# Patient Record
Sex: MALE | Race: WHITE
[De-identification: names, ages, dates, MRNs, and addresses within clinical notes are randomized per-mention and may not be internally consistent; named-entity substitution may affect disease eponyms.]

---

## 2020-06-13 ENCOUNTER — HOSPITAL ENCOUNTER (EMERGENCY)
Dept: HOSPITAL 75 - ER | Age: 65
Discharge: HOME | End: 2020-06-13
Payer: COMMERCIAL

## 2020-06-13 VITALS — BODY MASS INDEX: 29.17 KG/M2 | HEIGHT: 73.62 IN | WEIGHT: 224.87 LBS

## 2020-06-13 VITALS — SYSTOLIC BLOOD PRESSURE: 147 MMHG | DIASTOLIC BLOOD PRESSURE: 78 MMHG

## 2020-06-13 DIAGNOSIS — Z88.8: ICD-10-CM

## 2020-06-13 DIAGNOSIS — Z79.82: ICD-10-CM

## 2020-06-13 DIAGNOSIS — Z79.52: ICD-10-CM

## 2020-06-13 DIAGNOSIS — H11.31: Primary | ICD-10-CM

## 2020-06-13 DIAGNOSIS — K50.90: ICD-10-CM

## 2020-06-13 DIAGNOSIS — Z88.0: ICD-10-CM

## 2020-06-13 DIAGNOSIS — Z88.1: ICD-10-CM

## 2020-06-13 DIAGNOSIS — Z88.5: ICD-10-CM

## 2020-06-13 LAB
APTT BLD: 25 SEC (ref 24–35)
BASOPHILS # BLD AUTO: 0 10^3/UL (ref 0–0.1)
BASOPHILS NFR BLD AUTO: 0 % (ref 0–10)
EOSINOPHIL # BLD AUTO: 0.2 10^3/UL (ref 0–0.3)
EOSINOPHIL NFR BLD AUTO: 3 % (ref 0–10)
ERYTHROCYTE [DISTWIDTH] IN BLOOD BY AUTOMATED COUNT: 13.5 % (ref 10–14.5)
HCT VFR BLD CALC: 34 % (ref 40–54)
HGB BLD-MCNC: 11.2 G/DL (ref 13.3–17.7)
INR PPP: 0.9 (ref 0.8–1.4)
LYMPHOCYTES # BLD AUTO: 1.3 X 10^3 (ref 1–4)
LYMPHOCYTES NFR BLD AUTO: 26 % (ref 12–44)
MANUAL DIFFERENTIAL PERFORMED BLD QL: NO
MCH RBC QN AUTO: 32 PG (ref 25–34)
MCHC RBC AUTO-ENTMCNC: 33 G/DL (ref 32–36)
MCV RBC AUTO: 97 FL (ref 80–99)
MONOCYTES # BLD AUTO: 0.4 X 10^3 (ref 0–1)
MONOCYTES NFR BLD AUTO: 8 % (ref 0–12)
NEUTROPHILS # BLD AUTO: 3.3 X 10^3 (ref 1.8–7.8)
NEUTROPHILS NFR BLD AUTO: 63 % (ref 42–75)
PLATELET # BLD: 192 10^3/UL (ref 130–400)
PMV BLD AUTO: 9.1 FL (ref 7.4–10.4)
PROTHROMBIN TIME: 12 SEC (ref 12.2–14.7)
WBC # BLD AUTO: 5.3 10^3/UL (ref 4.3–11)

## 2020-06-13 PROCEDURE — 36415 COLL VENOUS BLD VENIPUNCTURE: CPT

## 2020-06-13 PROCEDURE — 85610 PROTHROMBIN TIME: CPT

## 2020-06-13 PROCEDURE — 85025 COMPLETE CBC W/AUTO DIFF WBC: CPT

## 2020-06-13 PROCEDURE — 85730 THROMBOPLASTIN TIME PARTIAL: CPT

## 2020-06-13 NOTE — XMS REPORT
Continuity of Care Document

                             Created on: 2020



Kiran Gibson

External Reference #: [id number]

: 1955

Sex: Male



Demographics





                          Address                   [address] [place] [address]

 

                          Home Phone                [phone] x

 

                          Preferred Language        Unknown

 

                          Marital Status            Unknown

 

                          Anabaptism Affiliation     Unknown

 

                          Race                      Unknown

 

                          Ethnic Group              Unknown





Author





                          Organization              Unknown

 

                          Address                   Unknown

 

                          Phone                     Unavailable



              



Allergies

      



There is no data.                  



Medications

      



There is no data.                  



Problems

      



There is no data.                  



Procedures

      



There is no data.                  



Results

      



                    Test                Result              Range        

 

                                        URIC ACID, SERUM - 19 12:19       

  

 

                    URIC ACID           6.1 mg/dL           4.0-8.0        

 

                                        CBC w/MANUAL DIFF - 19 12:19      

   

 

                    WHITE BLOOD CELL COUNT           7.5 Thousand/uL           3

.8-10.8        

 

                    RED BLOOD CELL COUNT           4.26 Million/uL           4.2

0-5.80        

 

                    HEMOGLOBIN           12.4 g/dL           13.2-17.1        

 

                    HEMATOCRIT           38.5 %              38.5-50.0        

 

                    MCV                 90.4 fL             80.0-100.0        

 

                    MCH                 29.1 pg             27.0-33.0        

 

                    MCHC                32.2 g/dL           32.0-36.0        

 

                    RDW                 13.2 %              11.0-15.0        

 

                    PLATELET COUNT           257 Thousand/uL           140-400  

      

 

                    MPV                 9.8 fL              7.5-12.5        

 

                    ABSOLUTE NEUTROPHILS           5460 cells/uL           1500-

7800        

 

                    ABSOLUTE MONOCYTES           585 cells/uL           200-950 

       

 

                    ABSOLUTE EOSINOPHILS           218 cells/uL           

        

 

                    ABSOLUTE BASOPHILS           0 cells/uL           0-200     

   

 

                    NEUTROPHILS           72.8 %              NRG        

 

                    LYMPHOCYTES           13.6 %              NRG        

 

                    MONOCYTES           7.8 %               NRG        

 

                    EOSINOPHILS           2.9 %               NRG        

 

                    BASOPHILS           0 %                 NRG        

 

                    ABSOLUTE BAND NEUTROPHILS           218 cells/uL           0

-750        

 

                    ABSOLUTE LYMPHOCYTES           1020 cells/uL           850-3

900        

 

                    BAND NEUTROPHILS           2.9 %               NRG        

 

                    PLATELET ESTIMATION           ADEQUATE            ADEQUATE  

      

 

                    CBC MORPHOLOGY                               NORMAL        

 

                    COMMENT(S)                               NRG        

 

                                        ESR/SED RATE - 19 12:19         

 

                    SED RATE BY MODIFIED WESTERGREN           34 mm/h           

  < OR = 20        

 

                                        CBC w/MANUAL DIFF - 19 15:07      

   

 

                    WHITE BLOOD CELL COUNT           7.5 Thousand/uL           3

.8-10.8        

 

                    RED BLOOD CELL COUNT           4.19 Million/uL           4.2

0-5.80        

 

                    HEMOGLOBIN           12.2 g/dL           13.2-17.1        

 

                    HEMATOCRIT           37.9 %              38.5-50.0        

 

                    MCV                 90.5 fL             80.0-100.0        

 

                    MCH                 29.1 pg             27.0-33.0        

 

                    MCHC                32.2 g/dL           32.0-36.0        

 

                    RDW                 13.2 %              11.0-15.0        

 

                    PLATELET COUNT           269 Thousand/uL           140-400  

      

 

                    MPV                 9.7 fL              7.5-12.5        

 

                    ABSOLUTE NEUTROPHILS           5625 cells/uL           1500-

7800        

 

                    ABSOLUTE MONOCYTES           75 cells/uL           200-950  

      

 

                    ABSOLUTE EOSINOPHILS           158 cells/uL           

        

 

                    ABSOLUTE BASOPHILS           158 cells/uL           0-200   

     

 

                    NEUTROPHILS           75.0 %              NRG        

 

                    LYMPHOCYTES           16.7 %              NRG        

 

                    MONOCYTES           1.0 %               NRG        

 

                    EOSINOPHILS           2.1 %               NRG        

 

                    BASOPHILS           2.1 %               NRG        

 

                    ABSOLUTE BAND NEUTROPHILS           233 cells/uL           0

-750        

 

                    ABSOLUTE LYMPHOCYTES           1253 cells/uL           850-3

900        

 

                    BAND NEUTROPHILS           3.1 %               NRG        

 

                    PLATELET ESTIMATION           ADEQUATE            ADEQUATE  

      

 

                    CBC MORPHOLOGY                               NORMAL        

 

                                        ESR/SED RATE - 19 15:07         

 

                    SED RATE BY MODIFIED WESTERGREN           19 mm/h           

  < OR = 20        

 

                                        RA (RHEUMATOID) FACTOR - 19 15:07 

        

 

                    RHEUMATOID FACTOR           <14 IU/mL           <14        

 

                                        JONA - 19 15:07         

 

                    JONA SCREEN, IFA           NEGATIVE            NEGATIVE      

  

 

                                        LYME, TOTAL ANTIBODY/REFLEX WESTERN BLOT

 - 19 09:41         

 

                    LYME AB SCREEN           <0.90 index           NRG        

 

                                        ESR/SED RATE - 20 15:22         

 

                    SED RATE BY MODIFIED WESTERGREN           9 mm/h            

  < OR = 20        

 

                                        TESTOSTERONE, FREE AND TOTAL - 20 

15:22         

 

                    TESTOSTERONE, TOTAL, LC/MS/MS           128 ng/dL           

250-1100        

 

                    TESTOSTERONE, FREE           23.1 pg/mL           46.0-224.0

        

 

                    TESTOSTERONE,BIOAVAILABLE           42.5 ng/dL           110

.0-575.0        

 

                    SEX HORMONE BINDING GLOBULIN           20 nmol/L           2

2-77        

 

                                        A1C - 20 15:22         

 

                    HEMOGLOBIN A1c           5.1 % of total Hgb           <5.7  

      

 

                                        VITAMIN B12/FOLATE, SERUM PANEL -  15:22         

 

                    VITAMIN B12           >2000 pg/mL           200-1100        

 

                    FOLATE, SERUM           >24.0 ng/mL           NRG        



                                      



Encounters

      



                ACCT No.           Visit Date/Time           Discharge          

 Status         

             Pt. Type           Provider           Facility           Loc./Unit 

          

Complaint        

 

                [id number]           2020 13:30:00           2020 23:59:5

9           [facility] 

                Outpatient                                           [facility]    

                                                 

 

             [id number]           2020 15:15:00                              

       Document

Registration                                                                    

 

             [id number]           2019 10:00:00                              

       Document

Registration                                                                    

 

             [id number]           2019 14:15:00                              

       Document

Registration                                                                    

 

             [id number]           2019 11:40:00                              

       Document

Registration

## 2020-06-13 NOTE — XMS REPORT
[facility]

                             Created on: 2019



Arthur Kiran

External Reference #: [id number]

: 1955

Sex: Male



Demographics





                          Address                   [address] [place]  [address]

 

                          Preferred Language        Unknown

 

                          Marital Status            Unknown

 

                          Faith Affiliation     Unknown

 

                          Race                      Unknown

 

                          Ethnic Group              Unknown





Author





                          Author                    Kiran BEVERLY              [facility]

 

                          Address                   [address] [place]  [address]



 

                          Phone                     [phone]







Care Team Providers





                    Care Team Member Name Role                Phone

 

                    CONCHA BEVERLY      Unavailable         [phone]







PROBLEMS





          Type      Condition ICD9-CM Code IYG62-MH Code Onset Dates Condition S

tatus SNOMED 

Code

 

          Problem   Protein malnutrition           E46                 Active   

 14008231

 

                          Problem                   Crohn''s disease with compli

cation, unspecified gastrointestinal tract 

location                  K50.919                   Active       83031800

 

          Problem   Exacerbation of Crohn''s disease with complication          

 K50.919             Active    

44235634

 

          Problem   Peyronie disease           N48.6               Active    133

5005

 

                Problem         Other, mixed, or unspecified nondependent drug a

buse, unspecified                 

F19.10                                  Active              [id number]

 

          Problem   Hypoglycemia           E16.2               Active    4751264

03

 

          Problem   Peyronie's disease           N48.6               Active    1

613651

 

          Problem   Situational depression           F43.21              Active 

   46818162

 

          Problem   Left otitis externa           H60.92              Active    

[id number]

 

          Problem   Hyperglyceridemia, pure           E78.1               Active

    472389593

 

          Problem   Other chronic pain           G89.29              Active    8

1149548

 

          Problem   CVA (cerebral infarction)           I63.9               Acti

ve    390822640

 

          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

kiara    [id number]

 

          Problem   Chronic pain           G89.29              Active    3002437

1







ALLERGIES

No Information



ENCOUNTERS





                Encounter       Location        Date            Diagnosis

 

                    [address] [address] 

Jul, 2019                                

 

                    [address] [address] 

2019                               Arthralgia, unspecified joint M25.50

 

                    [address] [address]                                Elevated sed rate R70.0 and Chronic pain

 G89.29

 

                    [address] [address]                                Elevated sed rate R70.0 and Chronic pain

 G89.29

 

                    [address] [address]                                AK (actinic keratosis) L57.0 ; Elevated 

sed rate R70.0 ; Arthralgia, 

unspecified joint M25.50 ; Arthralgia of right wrist M25.531 and Vitamin B12 
deficiency E53.8

 

                    [address] [address]                                Arthralgia of right wrist M25.531

 

                    [address] [address]                                Arthralgia of right wrist M25.531

 

                    [address] [address] 28 

May, 2019                               Cellulitis of left lower extremity L03.1

16 ; Vitamin B12 deficiency 

E53.8 and Encounter for venous access device care Z45.2

 

                    [address] [address] 03 

May, 2019                               Vitamin B12 deficiency E53.8

 

                    [address] [address]                                Exacerbation of Crohn''s disease with co

mplication K50.919

 

                    [address] [address]                                 

 

                    [address] [address]                                CVA (cerebral infarction) I63.9 ; Perfor

ation of left tympanic 

membrane H72.92 ; Basal cell carcinoma (BCC) of left side of nose C44.311 ; B12 
deficiency E53.8 and Hyperglycemia R73.9

 

                    [facility] [address] [place] [facility] [place] [address] 18

Mar, 2019                               Left otitis media H66.92

 

                    [address] [address] 15 

Mar, 2019                               Crohn''s disease with complication, unsp

ecified gastrointestinal tract

location K50.919

 

                    [address]  [place] 

[place] [address] 

Mar, 2019                               Crohn''s disease with complication, unsp

ecified gastrointestinal tract

location K50.919 ; Other, mixed, or unspecified nondependent drug abuse, 
unspecified F19.10 ; Hypoglycemia E16.2 ; Hyperglyceridemia, pure E78.1 ; 
Orchitis and epididymitis, unspecified N45.3 ; Hypopotassemia E87.6 ; 
Prostatitis, unspecified N41.9 ; Anemia, unspecified type D64.9 ; Endocarditis, 
unspecified chronicity, unspecified endocarditis type I38 ; Abdominal pain, 
unspecified abdominal location R10.9 ; Situational depression F43.21 ; Vitamin 
B12 deficiency E53.8 ; Ileus K56.7 ; Protein malnutrition E46 ; Other chronic 
pain G89.29 ; Testosterone deficiency E34.9 ; Testicular pain N50.819 ; 
Exacerbation of Crohn''s disease with complication K50.919 ; Peyronie disease 
N48.6 and Elevated liver enzymes R74.8







IMMUNIZATIONS





                Vaccine         Route           Administration Date Status

 

                B12, VITAMIN (UP TO 1000 MCG) IM Intramuscular March 15, 2019  A

dministered







SOCIAL HISTORY

Never Assessed



REASON FOR VISIT

port flush/B12



PLAN OF CARE





VITAL SIGNS





MEDICATIONS

Unknown Medications



RESULTS

No Results



PROCEDURES





                Procedure       Date Ordered    Result          Body Site

 

                IRRIG DRUG DELIVERY DEVICE March 15, 2019                   

 

                THER/PROPH/DIAG INJ, SC/IM March 15, 2019                   

 

                B12, VITAMIN (UP TO 1000 MCG) March 15, 2019                   







INSTRUCTIONS





MEDICATIONS ADMINISTERED

No Known Medications



MEDICAL (GENERAL) HISTORY





                    Type                Description         Date

 

                    Medical History     CVA (cerebral infarction)  

 

                    Medical History     Brain stem stroke syndrome  

 

                          Medical History           Crohn''s disease with compli

cation, unspecified gastrointestinal

tract location                           

 

                    Medical History     Hypoglycemia         

 

                    Medical History     Hyperglyceridemia, pure  

 

                    Medical History     Situational depression  

 

                    Medical History     Chronic pain         

 

                    Medical History     Peyronie's disease   

 

                          Medical History           Other, mixed, or unspecified

 nondependent drug abuse, 

unspecified                              

 

                    Medical History     Hypoglycemia         

 

                    Medical History     Hyperglyceridemia, pure  

 

                    Medical History     Protein malnutrition  

 

                    Surgical History    tonsillectomy        

 

                    Surgical History    testicle removal right  

 

                    Surgical History    colon resection x6   

 

                    Surgical History    appendectomy        1976

 

                    Surgical History    colon adhesion removal x5  

 

                    Surgical History    cholecystectomy      

 

                    Hospitalization History stroke               

 

                    Hospitalization History surgeries            

 

                    Hospitalization History crohns disease, several times  

 

                    Hospitalization History right ankle, possible infection

## 2020-06-13 NOTE — XMS REPORT
[facility]

                             Created on: 2020



Arthur Kiran

External Reference #: [id number]

: 1955

Sex: Male



Demographics





                          Address                   [address] [place]  [address]

 

                          Preferred Language        Unknown

 

                          Marital Status            Unknown

 

                          Mandaen Affiliation     Unknown

 

                          Race                      Unknown

 

                          Ethnic Group              Unknown





Author





                          Author                    Kiran BEVERLY              [facility]

 

                          Address                   [address] [place]  [address]



 

                          Phone                     [phone]







Care Team Providers





                    Care Team Member Name Role                Phone

 

                    RINA BEVERLYWELL      Unavailable         [phone]







PROBLEMS





          Type      Condition ICD9-CM Code FBF67-LU Code Onset Dates Condition S

tatus SNOMED 

Code

 

          Problem   Protein malnutrition           E46                 Active   

 99179630

 

                          Problem                   Crohn''s disease with compli

cation, unspecified gastrointestinal tract 

location                  K50.919                   Active       32417031

 

          Problem   Exacerbation of Crohn''s disease with complication          

 K50.919             Active    

86596898

 

          Problem   Situational depression           F43.21              Active 

   41227560

 

          Problem   Hyperglyceridemia, pure           E78.1               Active

    207633842

 

                Problem         Other, mixed, or unspecified nondependent drug a

buse, unspecified                 

F19.10                                  Active              [id number]

 

          Problem   Peyronie disease           N48.6               Active    133

5005

 

          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

kiara    26961208

 

          Problem   Chronic pain           G89.29              Active    4869783

1

 

          Problem   CVA (cerebral infarction)           I63.9               Acti

ve    983029612

 

                          Problem                   Type 2 diabetes mellitus wit

h other specified complication, without 

long-term current use of insulin              E11.69                    Active  

     94664170

 

          Problem   Other chronic pain           G89.29              Active    8

3780437

 

           Problem    Crohn's disease of small intestine without complications  

          K50.00                

Active                                  09253347

 

          Problem   Hypoglycemia           E16.2               Active    2901931

03

 

          Problem   Peyronie's disease           N48.6               Active    1

367328

 

          Problem   Left otitis externa           H60.92              Active    

[id number]

 

          Problem   Vitamin B12 deficiency           E53.8               Active 

   819839583

 

          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

ctive    498468594







ALLERGIES





             Substance    Reaction     Event Type   Date         Status

 

             Ibuprofen    rash         Drug Allergy  Active

 

             Cefazolin Sodium rash         Drug Allergy  Active

 

             Promethazine HCl unknown      Drug Allergy  Active

 

             Butorphanol Tartrate unknown      Drug Allergy  Active

 

             Penicillin G Benzathine anaphylaxis  Drug Allergy  Acti

ve

 

             Ofloxacin    diarrhea     Drug Allergy  Active

 

             Morphine Sulfate unknown      Drug Allergy  Active

 

             Keflex       rash         Drug Allergy  Active

 

             Piperacillin Sod-Tazobactam So swelling     Drug Allergy  Active

 

             Penicillin V Potassium swelling     Drug Allergy  Activ

e

 

             Zolpidem Tartrate unknown      Drug Allergy  Active







ENCOUNTERS





                Encounter       Location        Date            Diagnosis

 

                          [address]

[id number] 

[address]                               

 

                          [address]

[id number] 

[address]                               

 

                          [address]

[id number] 

[address]                14 May, 2020              Moderate depressive disorder

 F32.9 ; Pain of left eye 

H57.12 and Chronic pain G89.29

 

                          [address]

[id number] 

[address]                06 May, 2020              Vitamin B12 deficiency E53.8

 and Encounter for care 

related to Port-a-Cath Z45.2

 

                          [address]

[id number] 

[address]                06 May, 2020              CVA (cerebral infarction) I6

3.9

 

                          [address]

[id number] [place] 

[address]                16 Mar, 2020              Vitamin B12 deficiency E53.8

 and Encounter for venous 

access device care Z45.2

 

                          [address]

[id number] [place] 

[address]                13 2020              Vitamin B12 deficiency E53.8

 and Encounter for venous 

access device care Z45.2

 

                          [address]

[id number] 

[address]                10 2020               

 

                          [address]

[id number] [place] 

[address]                07 2020              CVA (cerebral infarction) I6

3.9 ; Moderate depressive 

disorder F32.9 and Chronic pain G89.29

 

                          [address]

[id number] [place] 

[address]                04 2020              CVA (cerebral infarction) I6

3.9

 

                          [address]

[id number] [place] 

[address]                              Crohn's disease of small int

estine without complications

K50.00 ; Moderate depressive disorder F32.9 ; Vitamin B12 deficiency E53.8 ; 
Testosterone deficiency E34.9 ; Chronic pain G89.29 and Type 2 diabetes mellitus
with other specified complication, without long-term current use of insulin 
E11.69

 

                          [address]

[id number] 

[address]                              Hyperglycemia R73.9 ; Elevat

ed sed rate R70.0 ; Vitamin 

B12 deficiency E53.8 and Testosterone deficiency E34.9

 

                          [address]

[id number] 

[address]                              Encounter for venous access 

device care Z45.2

 

                          [address]

[id number] 

[address]                27 Dec, 2019              B12 deficiency E53.8

 

                          [address]

[id number] 

[address]                              Vitamin B12 deficiency E53.8

 ; Encounter for venous 

access device care Z45.2 and Encounter for immunization Z23

 

                          [address]

[id number] 

[address]                15 Nov, 2019               

 

                          [address]

[id number] 

[address]                29 Oct, 2019              CVA (cerebral infarction) I6

3.9

 

                          [address]

[id number] 

[address]                16 Oct, 2019              Vitamin B12 deficiency E53.8

 ; Hyperglycemia R73.9 ; 

Testosterone deficiency E34.9 and Elevated sed rate R70.0

 

                          [address]

[id number] 

[address]                14 Oct, 2019               

 

                          [facility] [address]

[id number] [place] 

[address]                10 Oct, 2019              Crohn''s disease with compli

cation, unspecified 

gastrointestinal tract location K50.919

 

                          [facility] [address]

[id number] [place] 

[address]                23 Sep, 2019              Vitamin B12 deficiency E53.8

 

                          [facility] [address]

[id number] [place] 

[address]                20 Sep, 2019              Crohn''s disease with compli

cation, unspecified 

gastrointestinal tract location K50.919 ; Situational depression F43.21 and 
Onychomycosis of great toe B35.1

 

                          [facility] [address]

[id number] [place] 

[address]                10 Sep, 2019              Vitamin B12 deficiency E53.8

 

                          [facility] [address]

[id number] [place] 

[address]                23 Aug, 2019              Vitamin B12 deficiency E53.8

 

                          [facility] [address]

[id number] 

[address]                09 Aug, 2019              Vitamin B12 deficiency E53.8

 and Encounter for venous 

access device care Z45.2

 

                          [facility] [address]

[id number] [place] 

[address]                05 Aug, 2019               

 

                          [facility] [address]

[id number] [place] 

[address]                               

 

                          [facility] [address]

[id number] [place] 

[address]                               

 

                          [facility] [address]

[id number] 

[address]                              Crohn''s disease with compli

cation, unspecified 

gastrointestinal tract location K50.919

 

                          [facility] [address]

[id number] [place] 

[address]                15 Jul, 2019              CVA (cerebral infarction) I6

3.9

 

                          [facility] [address]

[id number] [place] 

[address]                15 Jul, 2019              Elevated sed rate R70.0

 

                          [facility] [address]

[id number] 

[address]                              Vitamin B12 deficiency E53.8

 and Crohn''s disease with 

complication, unspecified gastrointestinal tract location K50.919

 

                          [address]

[id number] [place] 

[address]                              Arthralgia, unspecified join

t M25.50

 

                          [address]

[id number] 

[address]                              Elevated sed rate R70.0 and 

Chronic pain G89.29

 

                          [facility] [address]

[id number] 

[address]                              Elevated sed rate R70.0 and 

Chronic pain G89.29

 

                          [address]

[id number] 

[address]                              AK (actinic keratosis) L57.0

 ; Elevated sed rate R70.0 ;

Arthralgia, unspecified joint M25.50 ; Arthralgia of right wrist M25.531 and 
Vitamin B12 deficiency E53.8

 

                          [facility] [address]

[id number] [place] 

[address]                              Arthralgia of right wrist M2

5.531

 

                          [address]

[id number] 

[address]                              Arthralgia of right wrist M2

5.531

 

                          [address]

[id number] 

[address]                28 May, 2019              Cellulitis of left lower ext

remity L03.116 ; Vitamin B12

deficiency E53.8 and Encounter for venous access device care Z45.2

 

                          [facility] [address]

[id number] 

[address]                03 May, 2019              Vitamin B12 deficiency E53.8

 

                          [address]

[id number] [place] 

[address]                              Exacerbation of Crohn''s dis

ease with complication 

K50.919

 

                          [address]

[id number] [place] 

[address]                               

 

                          [facility] [address]

[id number] [place] 

[address]                              CVA (cerebral infarction) I6

3.9 ; Perforation of left 

tympanic membrane H72.92 ; Basal cell carcinoma (BCC) of left side of nose 
C44.311 ; B12 deficiency E53.8 and Hyperglycemia R73.9

 

                          [facility] [address] 

[id number] [place]

[address]                18 Mar, 2019              Left otitis media H66.92

 

                          [address]

[id number] [place] 

[address]                15 Mar, 2019              Crohn''s disease with compli

cation, unspecified 

gastrointestinal tract location K50.919

 

                          [address]

[id number] [place] 

[address]                13 Mar, 2019              Crohn''s disease with compli

cation, unspecified 

gastrointestinal tract location K50.919 ; Other, mixed, or unspecified 
nondependent drug abuse, unspecified F19.10 ; Hypoglycemia E16.2 ; Hyperglycerid
emia, pure E78.1 ; Orchitis and epididymitis, unspecified N45.3 ; Hypopotassemia
E87.6 ; Prostatitis, unspecified N41.9 ; Anemia, unspecified type D64.9 ; 
Endocarditis, unspecified chronicity, unspecified endocarditis type I38 ; 
Abdominal pain, unspecified abdominal location R10.9 ; Situational depression 
F43.21 ; Vitamin B12 deficiency E53.8 ; Ileus K56.7 ; Protein malnutrition E46 ;
Other chronic pain G89.29 ; Testosterone deficiency E34.9 ; Testicular pain 
N50.819 ; Exacerbation of Crohn''s disease with complication K50.919 ; Peyronie 
disease N48.6 and Elevated liver enzymes R74.8







IMMUNIZATIONS





                Vaccine         Route           Administration Date Status

 

                B12, VITAMIN (UP TO 1000 MCG) IM Intramuscular 2019  A

dministered







SOCIAL HISTORY

Never Assessed



REASON FOR VISIT

stroke f/u



PLAN OF CARE





                          Activity                  Details

 

                                         

 

                          Follow Up                 6 Months Reason:fu







VITAL SIGNS





                    Height              6'2" in             2019

 

                    Weight              198 lbs             2019

 

                    BMI                 25.42 kg/m2         2019

 

                    Blood pressure systolic 136 mmHg            2019

 

                    Blood pressure diastolic 84 mmHg             2019







MEDICATIONS





        Medication Instructions Dosage  Frequency Start Date End Date Duration S

tatus

 

        Dilaudid 8 MG Orally 4 times a day 1 tablet as needed 6h                

              Active

 

        Methadone HCl 10 MG Orally Once a day 1 tablet 24h                      

       Active

 

        Lisinopril 5 MG Orally Once a day 1 tablet 24h                     30 da

y(s) Active

 

                    Levothyroxine Sodium 25 MCG Orally Once a day   1 tablet on 

an empty stomach in 

the morning  24h                                    30 day(s)    Active

 

        Spironolactone 50 MG Orally Once a day 1 tablet with food 24h           

          30 day(s) 

Active

 

          Cyanocobalamin 1000 MCG/ML Injection every 2 weeks 1 ml               

 13 Mar, 2019           

lifetime                                Active

 

        Gabapentin 100 MG Orally Three times a day 1 capsule 8h                 

     30 day(s) Active

 

             Temazepam 15 MG Orally Once a day 1-2 capsule at bedtime as needed 

24h                                              30 days             Active

 

        Questran Light 4 GM/DOSE Orally Twice a day 1 scoop 12h                 

    30 day(s) Active

 

          Venlafaxine HCl  MG Orally Once a day 1 capsule with food 24h   

                        30 

day(s)                                  Active







RESULTS





                Name            Result          Date            Reference Range

 

                A1C (IN HOUSE)                                   

 

                A1C IN HOUSE    5.3                             4.3 - 5.6 %

 

                Previous A1c                                     

 

                Lot             [id number]                             

 

                Exp date        2020                          







PROCEDURES





                Procedure       Date Ordered    Result          Body Site

 

                GLYCATED HEMOGLOBIN TEST 2019                   

 

                THER/PROPH/DIAG INJ, SC/IM 2019                   

 

                B12, VITAMIN (UP TO 1000 MCG) 2019                   

 

                [facility] VISIT ESTABLISHED PATIENT 2019                   

 

                DESTROY BENIGN/PREMLG LESION 2019                   

 

                LESION DESTRUCTION SINGLE (PREMALG) 2019      N/A         

     







INSTRUCTIONS





MEDICATIONS ADMINISTERED

No Known Medications



MEDICAL (GENERAL) HISTORY





                    Type                Description         Date

 

                    Medical History     CVA (cerebral infarction)  

 

                    Medical History     Brain stem stroke syndrome  

 

                          Medical History           Crohn''s disease with compli

cation, unspecified gastrointestinal

tract location                           

 

                    Medical History     Hypoglycemia         

 

                    Medical History     Hyperglyceridemia, pure  

 

                    Medical History     Situational depression  

 

                    Medical History     Chronic pain         

 

                    Medical History     Peyronie's disease   

 

                          Medical History           Other, mixed, or unspecified

 nondependent drug abuse, 

unspecified                              

 

                    Medical History     Hypoglycemia         

 

                    Medical History     Hyperglyceridemia, pure  

 

                    Medical History     Protein malnutrition  

 

                    Surgical History    tonsillectomy       1963

 

                    Surgical History    testicle removal right  

 

                    Surgical History    colon resection x6   

 

                    Surgical History    appendectomy        1976

 

                    Surgical History    colon adhesion removal x5  

 

                    Surgical History    cholecystectomy     

 

                    Surgical History    hernia inguinal repair (bilateral) 

 

                    Surgical History    insert non-tunnel cv cath 2009

 

                    Surgical History    hernia repair       1963

 

                    Surgical History    cataract removal on both eyes 10/2019

 

                    Hospitalization History stroke               

 

                    Hospitalization History surgeries            

 

                    Hospitalization History crohns disease, several times  

 

                    Hospitalization History right ankle, possible infection

## 2020-06-13 NOTE — XMS REPORT
[facility]

                             Created on: 2019



Arthur Kiran

External Reference #: [id number]

: 1955

Sex: Male



Demographics





                          Address                   [address] [place]  [address]

 

                          Preferred Language        Unknown

 

                          Marital Status            Unknown

 

                          Oriental orthodox Affiliation     Unknown

 

                          Race                      Unknown

 

                          Ethnic Group              Unknown





Author





                          Author                    Kiran RIZZO

 

                          [facility]

 

                          Address                   [address] [place]  [address]



 

                          Phone                     [phone]







Care Team Providers





                    Care Team Member Name Role                Phone

 

                    ELEANOR RIZZO        Unavailable         [phone]







PROBLEMS





          Type      Condition ICD9-CM Code BSR67-WX Code Onset Dates Condition S

tatus SNOMED 

Code

 

          Problem   Situational depression           F43.21              Active 

   13261235

 

          Problem   Hyperglyceridemia, pure           E78.1               Active

    [id number]

 

                Problem         Other, mixed, or unspecified nondependent drug a

buse, unspecified                 

F19.10                                  Active              [id number]

 

          Problem   Peyronie disease           N48.6               Active    133

5005

 

          Problem   Exacerbation of Crohn''s disease with complication          

 K50.919             Active    

[id number]

 

                          Problem                   Crohn''s disease with compli

cation, unspecified gastrointestinal tract 

location                  K50.919                   Active       77206175

 

          Problem   Other chronic pain           G89.29              Active    8

4380517

 

          Problem   Left otitis externa           H60.92              Active    

[id number]

 

          Problem   Protein malnutrition           E46                 Active   

 24286656

 

          Problem   Vitamin B12 deficiency           E53.8               Active 

   564912883

 

          Problem   Hypoglycemia           E16.2               Active    6623716

03

 

          Problem   CVA (cerebral infarction)           I63.9               Acti

ve    [id number]

 

          Problem   Chronic pain           G89.29              Active    9553060

1

 

          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

kiara    21612458

 

          Problem   Peyronie's disease           N48.6               Active    1

811203







ALLERGIES





             Substance    Reaction     Event Type   Date         Status

 

             Keflex       rash         Drug Allergy 18 Mar, 2019 Active

 

             Cefazolin Sodium rash         Drug Allergy 18 Mar, 2019 Active

 

             Zolpidem Tartrate unknown      Drug Allergy 18 Mar, 2019 Active

 

             Butorphanol Tartrate unknown      Drug Allergy 18 Mar, 2019 Active

 

             Penicillin V Potassium swelling     Drug Allergy 18 Mar, 2019 Activ

e

 

             Penicillin G Benzathine anaphylaxis  Drug Allergy 18 Mar, 2019 Acti

ve

 

             Ofloxacin    diarrhea     Drug Allergy 18 Mar, 2019 Active

 

             Morphine Sulfate unknown      Drug Allergy 18 Mar, 2019 Active

 

             Promethazine HCl unknown      Drug Allergy 18 Mar, 2019 Active

 

             Piperacillin Sod-Tazobactam So swelling     Drug Allergy 18 Mar, 20

19 Active

 

             IBU          unknown      Drug Allergy 18 Mar, 2019 Active







ENCOUNTERS





                Encounter       [facility]        Date            Diagnosis

 

                    [facility] [address] [address]                                 

 

                    [facility] [address] [address] 23 

Sep, 2019                               Vitamin B12 deficiency E53.8

 

                    [facility] [address] [address] 20 

Sep, 2019                               Crohn''s disease with complication, unsp

ecified gastrointestinal tract

location K50.919 ; Situational depression F43.21 and Onychomycosis of great toe 
B35.1

 

                    [facility] [address] [address] 10 

Sep, 2019                               Vitamin B12 deficiency E53.8

 

                    [facility] [address] [address] 23 

Aug, 2019                               Vitamin B12 deficiency E53.8

 

                    [address] [address] 09 

Aug, 2019                               Vitamin B12 deficiency E53.8 and Encount

er for venous access device 

care Z45.2

 

                    [facility] [address] [address] 05 

Aug, 2019                                

 

                    [address] [address]                                 

 

                    [facility] [address] [address]                                 

 

                    [address] [address]                                Crohn''s disease with complication, unsp

ecified gastrointestinal tract

location K50.919

 

                    [address] [address] 15 

Jul, 2019                               CVA (cerebral infarction) I63.9

 

                    [address] [address] 15 

Jul, 2019                               Elevated sed rate R70.0

 

                    [facility] [address] [address]                                Vitamin B12 deficiency E53.8 and Crohn''

s disease with complication, 

unspecified gastrointestinal tract location K50.919

 

                    [address] [address]                                Arthralgia, unspecified joint M25.50

 

                    [address] [address]                                Elevated sed rate R70.0 and Chronic pain

 G89.29

 

                    [address] [address]                                Elevated sed rate R70.0 and Chronic pain

 G89.29

 

                    [address] [address]                                AK (actinic keratosis) L57.0 ; Elevated 

sed rate R70.0 ; Arthralgia, 

unspecified joint M25.50 ; Arthralgia of right wrist M25.531 and Vitamin B12 
deficiency E53.8

 

                    [address] [address]                                Arthralgia of right wrist M25.531

 

                    [address] [address]                                Arthralgia of right wrist M25.531

 

                    [address] [address] 28 

May, 2019                               Cellulitis of left lower extremity L03.1

16 ; Vitamin B12 deficiency 

E53.8 and Encounter for venous access device care Z45.2

 

                    [address] [address] 03 

May, 2019                               Vitamin B12 deficiency E53.8

 

                    [address] [address]                                Exacerbation of Crohn''s disease with co

mplication K50.919

 

                    [address] [address]                                 

 

                    [address] [address]                                CVA (cerebral infarction) I63.9 ; Perfor

ation of left tympanic 

membrane H72.92 ; Basal cell carcinoma (BCC) of left side of nose C44.311 ; B12 
deficiency E53.8 and Hyperglycemia R73.9

 

                    [facility] [address] [place] [facility] [address] 18

Mar, 2019                               Left otitis media H66.92

 

                    [address] [address] 15 

Mar, 2019                               Crohn''s disease with complication, unsp

ecified gastrointestinal tract

location K50.919

 

                    [address] [address] 13 

Mar, 2019                               Crohn''s disease with complication, unsp

ecified gastrointestinal tract

location K50.919 ; Other, mixed, or unspecified nondependent drug abuse, 
unspecified F19.10 ; Hypoglycemia E16.2 ; Hyperglyceridemia, pure E78.1 ; 
Orchitis and epididymitis, unspecified N45.3 ; Hypopotassemia E87.6 ; 
Prostatitis, unspecified N41.9 ; Anemia, unspecified type D64.9 ; Endocarditis, 
unspecified chronicity, unspecified endocarditis type I38 ; Abdominal pain, 
unspecified abdominal location R10.9 ; Situational depression F43.21 ; Vitamin 
B12 deficiency E53.8 ; Ileus K56.7 ; Protein malnutrition E46 ; Other chronic 
pain G89.29 ; Testosterone deficiency E34.9 ; Testicular pain N50.819 ; 
Exacerbation of Crohn''s disease with complication K50.919 ; Peyronie disease 
N48.6 and Elevated liver enzymes R74.8







IMMUNIZATIONS

No Known Immunizations



SOCIAL HISTORY

Never Assessed



REASON FOR VISIT

left ear draining red, Pt presents today with drainage from left ear that starte
d yesterday/ pt stated minor pain/



PLAN OF CARE





                          Activity                  Details

 

                                         

 

                          Follow Up                 prn Reason:







VITAL SIGNS





                    Height              73.5 in             2019

 

                    Weight              202 lbs             2019

 

                    Temperature         97.3 degrees Fahrenheit 2019

 

                    BMI                 26.29 kg/m2         2019

 

                    Blood pressure systolic 142 mmHg            2019

 

                    Blood pressure diastolic 80 mmHg             2019







MEDICATIONS





        Medication Instructions Dosage  Frequency Start Date End Date Duration S

tatus

 

                    Cholestyramine 4 GM/DOSE                     MIX AND TAKE 1 

SCOOPFUL BY MOUTH TWICE DAILY AS 

DIRECTED                                            30           Not-Taking

 

        Spironolactone 50 MG Orally Once a day 1 tablet with food 24h           

          30 day(s) 

Active

 

        Questran Light 4 GM/DOSE Orally Twice a day 1 scoop 12h                 

    30 day(s) Active

 

                    Levothyroxine Sodium 25 MCG Orally Once a day   1 tablet on 

an empty stomach in 

the morning  24h                                    30 day(s)    Active

 

                    Azithromycin 250 MG Orally Once a day   2 tablets  on the fi

rst day, then 1 tablet

daily for 4 days 24h          18 Mar, 2019              5 day(s)     Active

 

          Cyanocobalamin 1000 MCG/ML Injection every 2 weeks 1 ml               

 13 Mar, 2019           

lifetime                                Active

 

        Lisinopril 5 MG Orally Once a day 1 tablet 24h                     30 da

y(s) Active

 

        Methadone HCl 10 MG/5ML Orally every 12 hrs 5 ml as needed 12h          

                   Active

 

        Effexor                                                 Active







RESULTS

No Results



PROCEDURES





                Procedure       Date Ordered    Result          Body Site

 

                [facility] VISIT NEW PATIENT 2019                   







INSTRUCTIONS





MEDICATIONS ADMINISTERED

No Known Medications



MEDICAL (GENERAL) HISTORY





                    Type                Description         Date

 

                    Medical History     CVA (cerebral infarction)  

 

                    Medical History     Brain stem stroke syndrome  

 

                          Medical History           Crohn''s disease with compli

cation, unspecified gastrointestinal

tract location                           

 

                    Medical History     Hypoglycemia         

 

                    Medical History     Hyperglyceridemia, pure  

 

                    Medical History     Situational depression  

 

                    Medical History     Chronic pain         

 

                    Medical History     Peyronie's disease   

 

                          Medical History           Other, mixed, or unspecified

 nondependent drug abuse, 

unspecified                              

 

                    Medical History     Hypoglycemia         

 

                    Medical History     Hyperglyceridemia, pure  

 

                    Medical History     Protein malnutrition  

 

                    Surgical History    tonsillectomy       1963

 

                    Surgical History    testicle removal right  

 

                    Surgical History    colon resection x6   

 

                    Surgical History    appendectomy        1976

 

                    Surgical History    colon adhesion removal x5  

 

                    Surgical History    cholecystectomy     

 

                    Surgical History    hernia inguinal repair (bilateral) 

 

                    Surgical History    insert non-tunnel cv cath 2009

 

                    Surgical History    hernia repair       1963

 

                    Hospitalization History stroke               

 

                    Hospitalization History surgeries            

 

                    Hospitalization History crohns disease, several times  

 

                    Hospitalization History right ankle, possible infection

## 2020-06-13 NOTE — ED EENT
History of Present Illness


General


Chief Complaint:  Eye Problems


Stated Complaint:  BLOOD IN RIGHT EYE


Nursing Triage Note:  


ARRIVED VIA AMB WITH COMPLAINTS OF BLOOD SHOT RIGHT EYE THAT IS NOW LEAKING OUT 


THE SIDE OF THE EYE.  DENIES INJURY.


Source:  patient


Exam Limitations:  no limitations





History of Present Illness


Date Seen by Provider:  Jun 13, 2020


Time Seen by Provider:  13:30


Initial Comments


To ER with reports of blood on the right eye. He states that he will have a 

blood vessel break on his eye about every 2 weeks. He has no discomfort and no 

visual changes. He had cataract surgery by Dr. West last year and has seen 

him a few times since then. He takes a baby aspirin daily. He takes prednisone 

daily for Crohn's disease. He has been using Plantain which is apparently a weed

in his yard to help with pain and uses Wild Lettuce which he dehydrates and 

makes in to a tea to at home to help with. He denies any trauma to the eye, 

denies any coughing or straining or vomiting episodes.


Timing/Duration:  abrupt


Severity:  moderate


Location:  eye (R)


Associated Symptoms:  denies symptoms





Allergies and Home Medications


Allergies


Coded Allergies:  


     Penicillins (Verified  Allergy, Severe, EDEMA, 6/13/20)


     adhesive tape (Verified  Allergy, Severe, RASH, 6/13/20)


     cefazolin (Verified  Allergy, Severe, RASH, 6/13/20)


     cephalexin (Verified  Allergy, Severe, RASH, 6/13/20)


     morphine (Verified  Allergy, Severe, EDEMA, 6/13/20)


     butabarbital (Verified  Allergy, Unknown, 6/13/20)


     butorphanol (Verified  Allergy, Unknown, 6/13/20)


     ibuprofen (Verified  Allergy, Unknown, 6/13/20)


     piperacillin (Verified  Allergy, Unknown, EDEMA, 6/13/20)


     promethazine (Verified  Allergy, Unknown, 6/13/20)


     silicone (Verified  Allergy, Unknown, 6/13/20)


     zolpidem (Verified  Allergy, Unknown, 6/13/20)


     ofloxacin (Verified  Adverse Reaction, Unknown, DIARRHEA, 6/13/20)





Patient Home Medication List


Home Medication List Reviewed:  Yes





Review of Systems


Review of Systems


Constitutional:  see HPI


Eyes:  See HPI


Ears:  No Symptoms Reported


Nose:  no symptoms reported


Mouth:  no symptoms reported


Throat:  no symptoms reported


Respiratory:  no symptoms reported


Cardiovascular:  no symptoms reported


Musculoskeletal:  no symptoms reported


Skin:  no symptoms reported





Past Medical-Social-Family Hx


Patient Social History


Recent Foreign Travel:  No


Contact w/Someone Who Travel:  No


Recent Infectious Disease Expo:  No





Physical Exam


Vital Signs





Vital Signs - First Documented








 6/13/20





 13:08


 


Temp 37.0


 


Pulse 84


 


Resp 16


 


B/P (MAP) 147/78 (101)


 


Pulse Ox 94


 


O2 Delivery Room Air








Height, Weight, BMI


Height: '"


Weight: lbs. oz. kg; 29.00 BMI


Method:


General Appearance:  WD/WN, no apparent distress


Eyes:  right eye other (there is a large subconjunctival hemorrhage affecting 

all quadrants of the visualized low except for the cornea. There is no hyphema. 

There is negative ecchymosis to the lateral aspect of the upper eyelid. Again, 

he denies trauma or injury to the area.); bilateral eye PERRL, bilateral eye 

EOMI


Mouth/Throat:  normal mouth inspection, pharynx normal


Neck:  non-tender, full range of motion


Respiratory:  no respiratory distress, no accessory muscle use


Neurologic/Psychiatric:  alert, normal mood/affect, oriented x 3


Skin:  normal color, warm/dry





Progress/Results/Core Measures


Results/Orders


My Orders





Orders - ELIDA HIDALGO


Cbc With Automated Diff (6/13/20 13:29)


Protime With Inr (6/13/20 13:29)


Partial Thromboplastin Time (6/13/20 13:29)





Vital Signs/I&O











 6/13/20





 13:08


 


Temp 37.0


 


Pulse 84


 


Resp 16


 


B/P (MAP) 147/78 (101)


 


Pulse Ox 94


 


O2 Delivery Room Air














Blood Pressure Mean:                    101











Departure


Communication (Admissions)


Spoke with Dr. Abernathy from optometry, he agrees with the workup and plan of 

care. All





Impression





   Primary Impression:  


   Subconjunctival hemorrhage of right eye


Disposition:  01 HOME, SELF-CARE


Condition:  Stable





Departure-Patient Inst.


Decision time for Depature:  13:36


Referrals:  


OFE TRENT OD, MAXWELL MD (PCP/Family)


Primary Care Physician


Patient Instructions:  Subconjunctival Hemorrhage





Add. Discharge Instructions:  


1. This is a subconjunctival hemorrhage and typically resolves in 2-3 weeks 

without long-term effects. I will have him follow-up with Dr. Trent. Call 

monday for an appointment time. 





All discharge instructions reviewed with patient and/or family. Voiced 

understanding.











ELIDA HIDALGO APRN             Jun 13, 2020 13:33

## 2020-11-17 ENCOUNTER — HOSPITAL ENCOUNTER (INPATIENT)
Dept: HOSPITAL 75 - ER FS | Age: 65
LOS: 3 days | Discharge: HOME | DRG: 386 | End: 2020-11-20
Attending: INTERNAL MEDICINE | Admitting: INTERNAL MEDICINE
Payer: MEDICARE

## 2020-11-17 VITALS — SYSTOLIC BLOOD PRESSURE: 123 MMHG | DIASTOLIC BLOOD PRESSURE: 58 MMHG

## 2020-11-17 VITALS — BODY MASS INDEX: 27.47 KG/M2 | HEIGHT: 73.98 IN | WEIGHT: 214.07 LBS

## 2020-11-17 DIAGNOSIS — K59.00: ICD-10-CM

## 2020-11-17 DIAGNOSIS — R11.2: ICD-10-CM

## 2020-11-17 DIAGNOSIS — E03.9: ICD-10-CM

## 2020-11-17 DIAGNOSIS — N17.9: ICD-10-CM

## 2020-11-17 DIAGNOSIS — Z86.79: ICD-10-CM

## 2020-11-17 DIAGNOSIS — D64.9: ICD-10-CM

## 2020-11-17 DIAGNOSIS — K50.90: Primary | ICD-10-CM

## 2020-11-17 DIAGNOSIS — E87.5: ICD-10-CM

## 2020-11-17 DIAGNOSIS — Z86.73: ICD-10-CM

## 2020-11-17 DIAGNOSIS — R41.0: ICD-10-CM

## 2020-11-17 DIAGNOSIS — Z86.711: ICD-10-CM

## 2020-11-17 LAB
ALBUMIN SERPL-MCNC: 4.4 GM/DL (ref 3.2–4.5)
ALP SERPL-CCNC: 110 U/L (ref 40–136)
ALT SERPL-CCNC: 8 U/L (ref 0–55)
APTT PPP: YELLOW S
BACTERIA #/AREA URNS HPF: (no result) /HPF
BASOPHILS # BLD AUTO: 0 10^3/UL (ref 0–0.1)
BASOPHILS NFR BLD AUTO: 0 % (ref 0–10)
BILIRUB SERPL-MCNC: 0.4 MG/DL (ref 0.1–1)
BILIRUB UR QL STRIP: NEGATIVE
BUN/CREAT SERPL: 16
CALCIUM SERPL-MCNC: 9 MG/DL (ref 8.5–10.1)
CHLORIDE SERPL-SCNC: 110 MMOL/L (ref 98–107)
CO2 SERPL-SCNC: 20 MMOL/L (ref 21–32)
CREAT SERPL-MCNC: 3.04 MG/DL (ref 0.6–1.3)
EOSINOPHIL # BLD AUTO: 0.2 10^3/UL (ref 0–0.3)
EOSINOPHIL NFR BLD AUTO: 3 % (ref 0–10)
FIBRINOGEN PPP-MCNC: CLEAR MG/DL
GFR SERPLBLD BASED ON 1.73 SQ M-ARVRAT: 21 ML/MIN
GLUCOSE SERPL-MCNC: 109 MG/DL (ref 70–105)
GLUCOSE UR STRIP-MCNC: NEGATIVE MG/DL
HCT VFR BLD CALC: 28 % (ref 40–54)
HGB BLD-MCNC: 9.2 G/DL (ref 13.3–17.7)
KETONES UR QL STRIP: NEGATIVE
LEUKOCYTE ESTERASE UR QL STRIP: NEGATIVE
LIPASE SERPL-CCNC: 64 U/L (ref 8–78)
LYMPHOCYTES # BLD AUTO: 0.8 X 10^3 (ref 1–4)
LYMPHOCYTES NFR BLD AUTO: 14 % (ref 12–44)
MAGNESIUM SERPL-MCNC: 1.3 MG/DL (ref 1.6–2.4)
MANUAL DIFFERENTIAL PERFORMED BLD QL: NO
MCH RBC QN AUTO: 31 PG (ref 25–34)
MCHC RBC AUTO-ENTMCNC: 33 G/DL (ref 32–36)
MCV RBC AUTO: 95 FL (ref 80–99)
MONOCYTES # BLD AUTO: 0.4 X 10^3 (ref 0–1)
MONOCYTES NFR BLD AUTO: 7 % (ref 0–12)
NEUTROPHILS # BLD AUTO: 4.5 X 10^3 (ref 1.8–7.8)
NEUTROPHILS NFR BLD AUTO: 77 % (ref 42–75)
NITRITE UR QL STRIP: NEGATIVE
PH UR STRIP: 5.5 [PH] (ref 5–9)
PLATELET # BLD: 257 10^3/UL (ref 130–400)
PMV BLD AUTO: 9.8 FL (ref 7.4–10.4)
POTASSIUM SERPL-SCNC: 5.2 MMOL/L (ref 3.6–5)
PROT SERPL-MCNC: 7.1 GM/DL (ref 6.4–8.2)
PROT UR QL STRIP: (no result)
RBC #/AREA URNS HPF: (no result) /HPF
SODIUM SERPL-SCNC: 141 MMOL/L (ref 135–145)
SP GR UR STRIP: 1.02 (ref 1.02–1.02)
SQUAMOUS #/AREA URNS HPF: (no result) /HPF
WBC # BLD AUTO: 5.8 10^3/UL (ref 4.3–11)
WBC #/AREA URNS HPF: (no result) /HPF

## 2020-11-17 PROCEDURE — 86901 BLOOD TYPING SEROLOGIC RH(D): CPT

## 2020-11-17 PROCEDURE — 96361 HYDRATE IV INFUSION ADD-ON: CPT

## 2020-11-17 PROCEDURE — 86850 RBC ANTIBODY SCREEN: CPT

## 2020-11-17 PROCEDURE — 74176 CT ABD & PELVIS W/O CONTRAST: CPT

## 2020-11-17 PROCEDURE — 36415 COLL VENOUS BLD VENIPUNCTURE: CPT

## 2020-11-17 PROCEDURE — 83735 ASSAY OF MAGNESIUM: CPT

## 2020-11-17 PROCEDURE — 80053 COMPREHEN METABOLIC PANEL: CPT

## 2020-11-17 PROCEDURE — 86920 COMPATIBILITY TEST SPIN: CPT

## 2020-11-17 PROCEDURE — 83540 ASSAY OF IRON: CPT

## 2020-11-17 PROCEDURE — 84443 ASSAY THYROID STIM HORMONE: CPT

## 2020-11-17 PROCEDURE — 86900 BLOOD TYPING SEROLOGIC ABO: CPT

## 2020-11-17 PROCEDURE — 83690 ASSAY OF LIPASE: CPT

## 2020-11-17 PROCEDURE — 85025 COMPLETE CBC W/AUTO DIFF WBC: CPT

## 2020-11-17 PROCEDURE — 71045 X-RAY EXAM CHEST 1 VIEW: CPT

## 2020-11-17 PROCEDURE — 76770 US EXAM ABDO BACK WALL COMP: CPT

## 2020-11-17 PROCEDURE — 96374 THER/PROPH/DIAG INJ IV PUSH: CPT

## 2020-11-17 PROCEDURE — 85027 COMPLETE CBC AUTOMATED: CPT

## 2020-11-17 PROCEDURE — 81000 URINALYSIS NONAUTO W/SCOPE: CPT

## 2020-11-17 PROCEDURE — 84484 ASSAY OF TROPONIN QUANT: CPT

## 2020-11-17 PROCEDURE — 93005 ELECTROCARDIOGRAM TRACING: CPT

## 2020-11-17 PROCEDURE — 82274 ASSAY TEST FOR BLOOD FECAL: CPT

## 2020-11-17 RX ADMIN — DOCUSATE SODIUM AND SENNOSIDES SCH EA: 8.6; 5 TABLET, FILM COATED ORAL at 22:39

## 2020-11-17 RX ADMIN — SODIUM CHLORIDE SCH MLS/HR: 900 INJECTION, SOLUTION INTRAVENOUS at 23:52

## 2020-11-17 RX ADMIN — SODIUM CHLORIDE SCH MLS/HR: 900 INJECTION, SOLUTION INTRAVENOUS at 16:27

## 2020-11-17 RX ADMIN — SODIUM CHLORIDE SCH MLS/HR: 900 INJECTION, SOLUTION INTRAVENOUS at 14:52

## 2020-11-17 NOTE — DIAGNOSTIC IMAGING REPORT
PROCEDURE: CT abdomen and pelvis without contrast.



TECHNIQUE: Multiple contiguous axial images were obtained through

the abdomen and pelvis without the use of intravenous contrast.

Auto Exposure Controls were utilized during the CT exam to meet

ALARA standards for radiation dose reduction. 



INDICATION: Abdominal pain and history of small bowel

obstruction.



COMPARISON: No prior studies are available for comparison.



FINDINGS: Lung bases demonstrate some scarring or atelectasis in

the left lower lobe. Liver is unremarkable. Gallbladder is

surgically absent. There is no biliary ductal dilatation.

Pancreas and spleen are unremarkable. No adrenal mass is

detected. No renal calculi are seen. There is no hydronephrosis.

Postsurgical changes in the anterior midabdomen are seen

involving the transverse colon. Bowel loops do not appear to be

appreciably dilated. No definite obstruction is seen. There is

moderate stool in the colon, consistent with constipation. There

is no free fluid or fluid collection identified. Bony structures

are unremarkable.



IMPRESSION: Moderate stool, suggestive of constipation. No other

significant abnormality is identified.



Dictated by: 



  Dictated on workstation # [id number]

## 2020-11-17 NOTE — ED ABDOMINAL PAIN
General


Chief Complaint:  Abdominal/GI Problems


Stated Complaint:  N/V


Nursing Triage Note:  


Patient presents to the ED with c/o nausea, vomiting, diarrhea, and confusion. 


He reports that he started becoming nauseous and vomited last night. He wife 


states that he has become more confused and she thinks he is dehydrated.


Sepsis Screen:  No Definite Risk


Source of Information:  Patient


Exam Limitations:  No Limitations





History of Present Illness


Date Seen by Provider:  Nov 17, 2020


Time Seen by Provider:  14:30


Initial Comments


Patient is a 65-year-old male with history of multiple small bowel obstructions 

and bowel resection who presents with nausea vomiting and diarrhea with 

increased confusion starting greater than 12 hours ago. Patient states he is 

nauseated constantly has had difficulty keeping food and fluid down and that 

when he does EDS liquid diarrhea. He denies dizziness lightheadedness chest pain

palpitations and shortness of breath. States he feels weak and fatigued when 

standing. No fevers chills, sweats. No known COVID exposures. Patient states 

current symptoms feel different than previous bowel obstructions.


Timing/Duration:  12-24 Hours


Severity/Quality:  Moderate


Location:  Generalized Abdomen


Radiation:  No Radiation


Modifying Factors:  Improves With Vomiting


Associated Symptoms:  Nausea/Vomiting





Allergies and Home Medications


Allergies


Coded Allergies:  


     Penicillins (Verified  Allergy, Severe, EDEMA, 6/13/20)


     adhesive tape (Verified  Allergy, Severe, RASH, 6/13/20)


     cefazolin (Verified  Allergy, Severe, RASH, 6/13/20)


     cephalexin (Verified  Allergy, Severe, RASH, 6/13/20)


     morphine (Verified  Allergy, Severe, EDEMA, 6/13/20)


     butabarbital (Verified  Allergy, Unknown, 6/13/20)


     butorphanol (Verified  Allergy, Unknown, 6/13/20)


     ibuprofen (Verified  Allergy, Unknown, 6/13/20)


     piperacillin (Verified  Allergy, Unknown, EDEMA, 6/13/20)


     promethazine (Verified  Allergy, Unknown, 6/13/20)


     silicone (Verified  Allergy, Unknown, 6/13/20)


     zolpidem (Verified  Allergy, Unknown, 6/13/20)


     ofloxacin (Verified  Adverse Reaction, Unknown, DIARRHEA, 6/13/20)





Patient Home Medication List


Home Medication List Reviewed:  Yes





Review of Systems


Review of Systems


Constitutional:  see HPI


EENTM:  See HPI


Respiratory:  See HPI


Cardiovascular:  See HPI


Gastrointestinal:  See HPI


Genitourinary:  See HPI


Musculoskeletal:  no symptoms reported


Skin:  see HPI


Psychiatric/Neurological:  See HPI


Endocrine:  See HPI


Hematologic/Lymphatic:  See HPI





All Other Systems Reviewed


Negative Unless Noted:  Yes





Past Medical-Social-Family Hx


Past Med/Social Hx:  Reviewed Nursing Past Med/Soc Hx


Patient Social History


Alcohol Use:  Denies Use


Recreational Drug Use:  No


Smoking Status:  Never a Smoker


2nd Hand Smoke Exposure:  No


Recent Foreign Travel:  No


Contact w/Someone Who Travel:  No


Recent Infectious Disease Expo:  No


Recent Hopitalizations:  No


Physical Abuse:  No


Sexual Abuse:  No


Mistreated:  No


Fear:  No





Seasonal Allergies


Seasonal Allergies:  No





Past Medical History


Surgeries:  Yes (X11 ABDOMIAL, EYE)


Respiratory:  No


Cardiac:  Yes (BLOD CLOT, PERICARDITIS)


Neurological:  Yes (BRAIN STEM STROKE)


Genitourinary:  No


Gastrointestinal:  Yes


Crohns Disease


Musculoskeletal:  No


Endocrine:  No


HEENT:  No


Cancer:  No


Psychosocial:  No


Integumentary:  No


Blood Disorders:  No





Physical Exam


Vital Signs





Vital Signs - First Documented








 11/17/20





 13:50


 


Temp 36.9


 


Pulse 67


 


Resp 16


 


B/P (MAP) 151/73 (99)


 


Pulse Ox 99


 


O2 Delivery Room Air





Capillary Refill : Less Than 3 Seconds


Height/Weight/BMI


Height: '"


Weight: lbs. oz. kg; 27.00 BMI


Method:


General Appearance:  WD/WN, no apparent distress


HEENT:  PERRL/EOMI, normal ENT inspection, TMs normal


Neck:  non-tender, full range of motion, supple


Respiratory:  chest non-tender, lungs clear


Cardiovascular:  normal peripheral pulses, regular rate, rhythm


Gastrointestinal:  soft, abnormal bowel sounds; No distended, No guarding, No 

rebound, No tenderness, No hernia, No mass, No hepatomegaly, No spleenomegaly; 

other


Extremities:  normal range of motion, non-tender, no pedal edema


Back:  normal inspection, no CVA tenderness


Neurologic/Psychiatric:  CNs II-XII nml as tested, no motor/sensory deficits, 

alert, oriented x 3


Skin:  normal color





Focused Exam


Sepsis Stage:  Ruled Out





Progress/Results/Core Measures


Results/Orders


Lab Results





Laboratory Tests








Test


 11/17/20


14:35 Range/Units


 


 


White Blood Count


 5.8 


 4.3-11.0


10^3/uL


 


Red Blood Count


 2.98 L


 4.35-5.85


10^6/uL


 


Hemoglobin 9.2 L 13.3-17.7  G/DL


 


Hematocrit 28 L 40-54  %


 


Mean Corpuscular Volume 95  80-99  FL


 


Mean Corpuscular Hemoglobin 31  25-34  PG


 


Mean Corpuscular Hemoglobin


Concent 33 


 32-36  G/DL





 


Red Cell Distribution Width 13.2  10.0-14.5  %


 


Platelet Count


 257 


 130-400


10^3/uL


 


Mean Platelet Volume 9.8  7.4-10.4  FL


 


Immature Granulocyte % (Auto) 0   %


 


Neutrophils (%) (Auto) 77 H 42-75  %


 


Lymphocytes (%) (Auto) 14  12-44  %


 


Monocytes (%) (Auto) 7  0-12  %


 


Eosinophils (%) (Auto) 3  0-10  %


 


Basophils (%) (Auto) 0  0-10  %


 


Neutrophils # (Auto) 4.5  1.8-7.8  X 10^3


 


Lymphocytes # (Auto) 0.8 L 1.0-4.0  X 10^3


 


Monocytes # (Auto) 0.4  0.0-1.0  X 10^3


 


Eosinophils # (Auto)


 0.2 


 0.0-0.3


10^3/uL


 


Basophils # (Auto)


 0.0 


 0.0-0.1


10^3/uL


 


Immature Granulocyte # (Auto)


 0.0 


 0.0-0.1


10^3/uL


 


Urine Color YELLOW   


 


Urine Clarity CLEAR   


 


Urine pH 5.5  5-9  


 


Urine Specific Gravity 1.025 H 1.016-1.022  


 


Urine Protein 1+ H NEGATIVE  


 


Urine Glucose (UA) NEGATIVE  NEGATIVE  


 


Urine Ketones NEGATIVE  NEGATIVE  


 


Urine Nitrite NEGATIVE  NEGATIVE  


 


Urine Bilirubin NEGATIVE  NEGATIVE  


 


Urine Urobilinogen 0.2  < = 1.0  MG/DL


 


Urine Leukocyte Esterase NEGATIVE  NEGATIVE  


 


Urine RBC (Auto) 1+ H NEGATIVE  


 


Urine RBC 0-2   /HPF


 


Urine WBC NONE   /HPF


 


Urine Squamous Epithelial


Cells RARE 


  /HPF





 


Urine Crystals NONE   /LPF


 


Urine Bacteria NONE   /HPF


 


Urine Casts NONE   /LPF


 


Urine Mucus NEGATIVE   /LPF


 


Urine Culture Indicated NO   


 


Sodium Level 141  135-145  MMOL/L


 


Potassium Level 5.2 H 3.6-5.0  MMOL/L


 


Chloride Level 110 H   MMOL/L


 


Carbon Dioxide Level 20 L 21-32  MMOL/L


 


Anion Gap 11  5-14  MMOL/L


 


Blood Urea Nitrogen 50 H 7-18  MG/DL


 


Creatinine


 3.04 H


 0.60-1.30


MG/DL


 


Estimat Glomerular Filtration


Rate 21 


  





 


BUN/Creatinine Ratio 16   


 


Glucose Level 109 H   MG/DL


 


Calcium Level 9.0  8.5-10.1  MG/DL


 


Corrected Calcium 8.7  8.5-10.1  MG/DL


 


Magnesium Level 1.3 L 1.6-2.4  MG/DL


 


Total Bilirubin 0.4  0.1-1.0  MG/DL


 


Aspartate Amino Transf


(AST/SGOT) 11 


 5-34  U/L





 


Alanine Aminotransferase


(ALT/SGPT) 8 


 0-55  U/L





 


Alkaline Phosphatase 110    U/L


 


Troponin I < 0.30  <0.30  NG/ML


 


Total Protein 7.1  6.4-8.2  GM/DL


 


Albumin 4.4  3.2-4.5  GM/DL


 


Lipase 64  8-78  U/L








My Orders





Orders - HARLEEN GARDNER DO


Cbc With Automated Diff (11/17/20 13:51)


Comprehensive Metabolic Panel (11/17/20 13:51)


Lipase (11/17/20 13:51)


Ua Culture If Indicated (11/17/20 13:51)


Chest 1 View Ap/Pa Only (11/17/20 13:51)


Ondansetron Injection (Zofran Injectio (11/17/20 14:00)


Ns Iv 1000 Ml (Sodium Chloride 0.9%) (11/17/20 14:00)


Magnesium (11/17/20 13:52)


Thyroid Stimulating Hormone (11/17/20 13:52)


Troponin I Fs (11/17/20 13:52)


Ekg Tracing (11/17/20 13:52)


Ct Abdomen/Pelvis Wo (11/17/20 15:04)


Ns Iv 1000 Ml (Sodium Chloride 0.9%) (11/17/20 16:15)


Ns Iv 1000 Ml (Sodium Chloride 0.9%) (11/17/20 16:15)





Medications Given in ED





Current Medications








 Medications  Dose


 Ordered  Sig/Khalif


 Route  Start Time


 Stop Time Status Last Admin


Dose Admin


 


 Ondansetron HCl  4 mg  ONCE  ONCE


 IVP  11/17/20 14:00


 11/17/20 14:01 DC 11/17/20 14:52


4 MG








Vital Signs/I&O











 11/17/20





 13:50


 


Temp 36.9


 


Pulse 67


 


Resp 16


 


B/P (MAP) 151/73 (99)


 


Pulse Ox 99


 


O2 Delivery Room Air














Blood Pressure Mean:                    99











Departure


Communication (Admissions)


Patient with vomiting and poor oral intake with findings of acute kidney injury 

and hyperkalemia. IV fluids given the ED. Vital signs stable we'll admit to the 

hospitalist service for further evaluation and treatment.





Impression





   Primary Impression:  


   Acute kidney injury


   Additional Impressions:  


   Hyperkalemia


   Nausea vomiting and diarrhea


Disposition:  09 ADMITTED AS INPATIENT


Condition:  Stable





Admissions


Decision to Admit Reason:  Admit from ER (General)





Transfer


Transfer Reason:  Exceeds level of care


Time Spoke to Accepting Phy:  16:28


Transfer Progress Notes


Dr. Savage


Transfer Time:  16:29





Departure-Patient Inst.


Referrals:  


SELFCONCHA MD (PCP/Family)


Primary Care Physician











HARLEEN GARDNER DO                   Nov 17, 2020 16:23

## 2020-11-17 NOTE — NUR
ALERTED DR URENA TO PT C/O  PAIN, WITH NO PAIN MEDICATION NOTED ON EMAR, SHE PUT IN ORDERS 
FOR PAIN MEDS. PT STATES HE TAKES 8MG OF HYDROMORPHONE HCL Q6HR PRN AT HOME FOR PAIN. I 
RECHECKED FOR CORRECT DOSING WITH PT AS WELL AS EXTERNAL MEDICATION REFILL LIST. PT STATES 
HIS PAIN FROM [facility] DX IS SEVERE AND THAT HIS DR HAS WORKED FOR A LONG TIME WITH HIM TO 
GET A CORRECT DOSING REGIMEN TO STAY ON TOP OF HIS CHRONIC PAIN.

## 2020-11-17 NOTE — DIAGNOSTIC IMAGING REPORT
INDICATION: Weakness. Abdominal pain.



COMPARISON: None.



FINDINGS: Single frontal view of the chest demonstrates normal

heart size and pulmonary vascularity. Right-sided subclavian

Port-A-Cath is noted with tip in the low SVC. The lungs are well

aerated and clear. No large pleural effusion or pneumothorax is

seen. The visualized osseous structures show multiple old

posterolateral left rib fractures.



IMPRESSION:

1. No acute cardiopulmonary process.



Dictated by: 



  Dictated on workstation # [id number]

## 2020-11-18 VITALS — DIASTOLIC BLOOD PRESSURE: 60 MMHG | SYSTOLIC BLOOD PRESSURE: 132 MMHG

## 2020-11-18 VITALS — SYSTOLIC BLOOD PRESSURE: 144 MMHG | DIASTOLIC BLOOD PRESSURE: 65 MMHG

## 2020-11-18 VITALS — DIASTOLIC BLOOD PRESSURE: 77 MMHG | SYSTOLIC BLOOD PRESSURE: 148 MMHG

## 2020-11-18 VITALS — SYSTOLIC BLOOD PRESSURE: 154 MMHG | DIASTOLIC BLOOD PRESSURE: 74 MMHG

## 2020-11-18 VITALS — DIASTOLIC BLOOD PRESSURE: 70 MMHG | SYSTOLIC BLOOD PRESSURE: 145 MMHG

## 2020-11-18 VITALS — SYSTOLIC BLOOD PRESSURE: 152 MMHG | DIASTOLIC BLOOD PRESSURE: 70 MMHG

## 2020-11-18 VITALS — SYSTOLIC BLOOD PRESSURE: 118 MMHG | DIASTOLIC BLOOD PRESSURE: 64 MMHG

## 2020-11-18 VITALS — DIASTOLIC BLOOD PRESSURE: 73 MMHG | SYSTOLIC BLOOD PRESSURE: 145 MMHG

## 2020-11-18 VITALS — DIASTOLIC BLOOD PRESSURE: 64 MMHG | SYSTOLIC BLOOD PRESSURE: 118 MMHG

## 2020-11-18 VITALS — DIASTOLIC BLOOD PRESSURE: 70 MMHG | SYSTOLIC BLOOD PRESSURE: 152 MMHG

## 2020-11-18 LAB
ALBUMIN SERPL-MCNC: 3.5 GM/DL (ref 3.2–4.5)
ALP SERPL-CCNC: 74 U/L (ref 40–136)
ALT SERPL-CCNC: < 6 U/L (ref 0–55)
BASOPHILS # BLD AUTO: 0 10^3/UL (ref 0–0.1)
BASOPHILS NFR BLD AUTO: 0 % (ref 0–10)
BILIRUB SERPL-MCNC: 0.4 MG/DL (ref 0.1–1)
BUN/CREAT SERPL: 16
CALCIUM SERPL-MCNC: 7.5 MG/DL (ref 8.5–10.1)
CHLORIDE SERPL-SCNC: 117 MMOL/L (ref 98–107)
CO2 SERPL-SCNC: 16 MMOL/L (ref 21–32)
CREAT SERPL-MCNC: 2.46 MG/DL (ref 0.6–1.3)
EOSINOPHIL # BLD AUTO: 0.1 10^3/UL (ref 0–0.3)
EOSINOPHIL NFR BLD AUTO: 2 % (ref 0–10)
GFR SERPLBLD BASED ON 1.73 SQ M-ARVRAT: 27 ML/MIN
GLUCOSE SERPL-MCNC: 130 MG/DL (ref 70–105)
HCT VFR BLD CALC: 23 % (ref 40–54)
HGB BLD-MCNC: 7.5 G/DL (ref 13.3–17.7)
LYMPHOCYTES # BLD AUTO: 0.7 10^3/UL (ref 1–4)
LYMPHOCYTES NFR BLD AUTO: 13 % (ref 12–44)
MANUAL DIFFERENTIAL PERFORMED BLD QL: NO
MCH RBC QN AUTO: 31 PG (ref 25–34)
MCHC RBC AUTO-ENTMCNC: 32 G/DL (ref 32–36)
MCV RBC AUTO: 98 FL (ref 80–99)
MONOCYTES # BLD AUTO: 0.5 10^3/UL (ref 0–1)
MONOCYTES NFR BLD AUTO: 9 % (ref 0–12)
NEUTROPHILS # BLD AUTO: 3.9 10^3/UL (ref 1.8–7.8)
NEUTROPHILS NFR BLD AUTO: 75 % (ref 42–75)
PLATELET # BLD: 198 10^3/UL (ref 130–400)
PMV BLD AUTO: 10.2 FL (ref 9–12.2)
POTASSIUM SERPL-SCNC: 4.3 MMOL/L (ref 3.6–5)
PROT SERPL-MCNC: 5.6 GM/DL (ref 6.4–8.2)
SODIUM SERPL-SCNC: 143 MMOL/L (ref 135–145)
WBC # BLD AUTO: 5.1 10^3/UL (ref 4.3–11)

## 2020-11-18 RX ADMIN — FENTANYL CITRATE PRN MCG: 50 INJECTION, SOLUTION INTRAMUSCULAR; INTRAVENOUS at 16:26

## 2020-11-18 RX ADMIN — PREGABALIN SCH MG: 75 CAPSULE ORAL at 12:36

## 2020-11-18 RX ADMIN — FENTANYL CITRATE PRN MCG: 50 INJECTION, SOLUTION INTRAMUSCULAR; INTRAVENOUS at 05:32

## 2020-11-18 RX ADMIN — METHADONE HYDROCHLORIDE SCH MG: 10 TABLET ORAL at 12:37

## 2020-11-18 RX ADMIN — ONDANSETRON PRN MG: 2 INJECTION, SOLUTION INTRAMUSCULAR; INTRAVENOUS at 21:46

## 2020-11-18 RX ADMIN — SODIUM CHLORIDE SCH MLS/HR: 900 INJECTION, SOLUTION INTRAVENOUS at 10:12

## 2020-11-18 RX ADMIN — DOCUSATE SODIUM AND SENNOSIDES SCH EA: 8.6; 5 TABLET, FILM COATED ORAL at 09:00

## 2020-11-18 RX ADMIN — FENTANYL CITRATE PRN MCG: 50 INJECTION, SOLUTION INTRAMUSCULAR; INTRAVENOUS at 01:48

## 2020-11-18 RX ADMIN — DOCUSATE SODIUM AND SENNOSIDES SCH EA: 8.6; 5 TABLET, FILM COATED ORAL at 20:47

## 2020-11-18 RX ADMIN — FENTANYL CITRATE PRN MCG: 50 INJECTION, SOLUTION INTRAMUSCULAR; INTRAVENOUS at 21:46

## 2020-11-18 RX ADMIN — PREGABALIN SCH MG: 75 CAPSULE ORAL at 20:48

## 2020-11-18 RX ADMIN — HYDROMORPHONE HYDROCHLORIDE PRN MG: 4 TABLET ORAL at 09:10

## 2020-11-18 RX ADMIN — ONDANSETRON PRN MG: 2 INJECTION, SOLUTION INTRAMUSCULAR; INTRAVENOUS at 10:56

## 2020-11-18 RX ADMIN — SODIUM CHLORIDE SCH MG: 900 INJECTION, SOLUTION INTRAVENOUS at 12:35

## 2020-11-18 RX ADMIN — METHADONE HYDROCHLORIDE SCH MG: 10 TABLET ORAL at 23:32

## 2020-11-18 RX ADMIN — ONDANSETRON PRN MG: 2 INJECTION, SOLUTION INTRAMUSCULAR; INTRAVENOUS at 01:49

## 2020-11-18 RX ADMIN — SODIUM CHLORIDE SCH MLS/HR: 900 INJECTION, SOLUTION INTRAVENOUS at 03:37

## 2020-11-18 RX ADMIN — METHADONE HYDROCHLORIDE SCH MG: 10 TABLET ORAL at 17:52

## 2020-11-18 RX ADMIN — ONDANSETRON PRN MG: 2 INJECTION, SOLUTION INTRAMUSCULAR; INTRAVENOUS at 16:26

## 2020-11-18 RX ADMIN — FENTANYL CITRATE PRN MCG: 50 INJECTION, SOLUTION INTRAMUSCULAR; INTRAVENOUS at 10:56

## 2020-11-18 NOTE — CONSULTATION - SURGERY
History of Present Illness


History of Present Illness


Patient Consulted On(phil/time)


20


 11:04


Date Seen by Provider:  2020


Time Seen by Provider:  11:04


History of Present Illness


Consult is requested by Dr. Savage for Crohn's 





Patient is a 65-year-old male with history of Crohn's with gastroenterologist in

the [place] area.  Patient states he usually is on Humira.  Patient has flar

eups from time to time which consist of abdominal pain and diarrhea he states.  

Patient states for about the last 3 weeks he has been having diarrhea multiple 

bowel movements per day.  He does not believe there is any blood in his stools 

but not sure.  He states he has a cramping pain in the lower abdomen but also 

all over the abdomen that he rates at 8 out of 10.  Nothing really seems to make

it better.  Nothing really seems to make it worse that he knows of.  Patient 

also with nausea and vomiting.  He could tell he was getting dehydrated between 

the nausea vomiting diarrhea he states.  Due to his continued and worsening 

symptoms patient went to the emergency department for evaluation.  Patient 

feeling fatigued and weak.  Currently he states he is feeling a little bit 

better.  He had a CT scan which was mostly consistent with constipation.





Allergies and Home Medications


Allergies


Coded Allergies:  


     Penicillins (Verified  Allergy, Severe, EDEMA, 20)


     adhesive tape (Verified  Allergy, Severe, RASH, 20)


     cefazolin (Verified  Allergy, Severe, RASH, 20)


     cephalexin (Verified  Allergy, Severe, RASH, 20)


     morphine (Verified  Allergy, Severe, EDEMA, takes Hydromorphone at home, 

20)


     butabarbital (Verified  Allergy, Unknown, 20)


     butorphanol (Verified  Allergy, Unknown, 20)


     ibuprofen (Verified  Allergy, Unknown, 20)


     piperacillin (Verified  Allergy, Unknown, EDEMA, 20)


     promethazine (Verified  Allergy, Unknown, 20)


     silicone (Verified  Allergy, Unknown, 20)


     zolpidem (Verified  Allergy, Unknown, 20)


     ofloxacin (Verified  Adverse Reaction, Unknown, DIARRHEA, 20)





Home Medications


Acetaminophen 325 Mg Capsule, 650 MG PO Q8H PRN for PAIN-MILD (1-4), (Reported)


Azathioprine 50 Mg Tablet, 50 MG PO DAILY, (Reported)


Cholestyramine (with Sugar) 378 Gm Powder, 4 GM PO BID PRN for DIARRHEA, 

(Reported)


Hydromorphone HCl 8 Mg Tablet, 8 MG PO Q6H PRN for PAIN-SEVERE (8-10), 

(Reported)


Levothyroxine Sodium 25 Mcg Tablet, 25 MCG PO DAILY, (Reported)


Lisinopril 5 Mg Tablet, 5 MG PO DAILY, (Reported)


Methadone HCl 10 Mg Tablet, 10 MG PO Q6- 8H, (Reported)


Multivitamin 1 Each Tablet, 1 EACH PO DAILY, (Reported)


Peg 400/Hypromellose/Glycerin 15 Ml Drops, 2 DROPS OU PRN PRN for DRY EYES, 

(Reported)


Pregabalin 75 Mg Capsule, 75 MG PO TID, (Reported)


Spironolactone 50 Mg Tablet, 50 MG PO DAILY, (Reported)


Temazepam 15 Mg Capsule, 15-30 MG PO HS PRN for SLEEP, (Reported)


Terbinafine HCl 250 Mg Tablet, 250 MG PO DAILY, (Reported)


Venlafaxine HCl 75 Mg Cap.er.24h, 75 MG PO DAILY, (Reported)


   TAKES 75MG +150MG TO EQUAL 225MG DAILY 


Venlafaxine HCl 150 Mg Cap.er.24h, 150 MG PO DAILY, (Reported)


   TAKES 75MG +150MG TO EQUAL 225MG DAILY 





Patient Home Medication List


Home Medication List Reviewed:  Yes





Past Medical-Social-Family Hx


Patient Social History


Alcohol Use:  Denies Use


Recreational Drug Use:  No


Smoking Status:  Never a Smoker


2nd Hand Smoke Exposure:  No


Recent Foreign Travel:  No


Contact w/Someone Who Travel:  No


Recent Infectious Disease Expo:  No


Recent Hopitalizations:  No





Immunizations Up To Date


Date of Influenza Vaccine:  Oct 3, 2019





Seasonal Allergies


Seasonal Allergies:  No





Surgeries


History of Surgeries:  Yes (X11 ABDOMIAL, EYE)


Surgeries:  Appendectomy, Bowel Surgery, Eye Surgery, Gallbladder





Respiratory


History of Respiratory Disorde:  No





Cardiovascular


History of Cardiac Disorders:  Yes (BLOD CLOT, PERICARDITIS)





Neurological


History of Neurological Disord:  Yes (BRAIN STEM STROKE)


Neurological Disorders:  Stroke (brainstem CVA 2 years ago)





Genitourinary


History of Genitourinary Disor:  No





Gastrointestinal


History of Gastrointestinal Di:  Yes


Gastrointestinal Disorders:  Crohns Disease, Obstructive Bowel





Musculoskeletal


History of Musculoskeletal Dis:  No





Endocrine


History of Endocrine Disorders:  No


Endocrine Disorders:  Hypothyroidsim





HEENT


History of HEENT Disorders:  No


HEENT Disorders:  Cataract


Loss of Vision:  Denies


Hearing Impairment:  Denies





Cancer


History of Cancer:  No





Psychosocial


History of Psychiatric Problem:  No





Integumentary


History of Skin or Integumenta:  No





Blood Transfusions


History of Blood Disorders:  No





Reviewed Nursing Assessment


Reviewed/Agree w Nursing PMH:  Yes





Family Medical History


Significant Family History:  Asthma (DAD)





Review of Systems-General


Constitutional:  No fever; weakness


EENTM:  No hearing loss, No ear pain


Respiratory:  No cough, No dyspnea on exertion


Cardiovascular:  No chest pain, No edema


Gastrointestinal:  abdominal pain (Diffuse)


Genitourinary:  No decreased output, No discharge


Musculoskeletal:  No back pain, No joint pain


Skin:  No change in color, No change in hair/nails


Psychiatric/Neurological:  Denies Anxiety, Denies Depressed, Denies Emotional 

Problems


All Other Systems Reviewed


Negative Unless Noted:  Yes (Negative excepted noted.)





Physical Exam-General Problems


Physical Exam


Vital Signs





Vital Signs - First Documented








 20





 13:50


 


Temp 36.9


 


Pulse 67


 


Resp 16


 


B/P (MAP) 151/73 (99)


 


Pulse Ox 99


 


O2 Delivery Room Air





Capillary Refill : Less Than 3 Seconds


General Appearance:  WD/WN, no apparent distress


HEENT:  PERRL/EOMI, normal ENT inspection


Neck:  non-tender, supple


Respiratory:  chest non-tender, no respiratory distress, no accessory muscle use


Cardiovascular:  regular rate, rhythm, no edema, no JVD


Gastrointestinal:  soft; No guarding, No rebound; tenderness (Minimal discomfort

with palpation which is diffuse)


Rectal:  deferred


Back:  no CVA tenderness, no vertebral tenderness


Extremities:  normal range of motion, non-tender, normal inspection


Neurologic/Psychiatric:  CNs II-XII nml as tested, no motor/sensory deficits, 

alert, normal mood/affect, oriented x 3


Skin:  normal color, warm/dry


Lymphatic:  no adenopathy





Data Review


Labs


Laboratory Tests


20 03:40: 


White Blood Count 5.1, Red Blood Count 2.40L, Hemoglobin 7.5L, Hematocrit 23L, 

Mean Corpuscular Volume 98, Mean Corpuscular Hemoglobin 31, Mean Corpuscular 

Hemoglobin Concent 32, Red Cell Distribution Width 12.9, Platelet Count 198, 

Mean Platelet Volume 10.2, Immature Granulocyte % (Auto) 0, Neutrophils (%) 

(Auto) 75, Lymphocytes (%) (Auto) 13, Monocytes (%) (Auto) 9, Eosinophils (%) 

(Auto) 2, Basophils (%) (Auto) 0, Neutrophils # (Auto) 3.9, Lymphocytes # (Auto)

0.7L, Monocytes # (Auto) 0.5, Eosinophils # (Auto) 0.1, Basophils # (Auto) 0.0, 

Immature Granulocyte # (Auto) 0.0, Sodium Level 143, Potassium Level 4.3, 

Chloride Level 117H, Carbon Dioxide Level 16L, Anion Gap 10, Blood Urea Nitrogen

39H, Creatinine 2.46H, Estimat Glomerular Filtration Rate 27, BUN/Creatinine 

Ratio 16, Glucose Level 130H, Calcium Level 7.5L, Corrected Calcium 7.9L, Total 

Bilirubin 0.4, Aspartate Amino Transf (AST/SGOT) 10, Alanine Aminotransferase 

(ALT/SGPT) < 6, Alkaline Phosphatase 74, Total Protein 5.6L, Albumin 3.5





Assessment/Plan


Nausea/Vomiting/Diarrhea


CHARO


Hyperkalemia


Anemia


Crohn's disease


H/O SBO's S/P bowel resections





Follow hemoglobin transfuse as needed


We will continue fluid resuscitation.


Crohn's is symptoms do not resolve he states similar episodes of flareups of 

Crohn's like this, will consider starting steroids if symptoms do not improve


Symptomatic control


No surgical intervention at this time





Clinical Quality Measures


DVT/VTE Risk/Contraindication:


Risk Factor Score Per Nursin


RFS Level Per Nursing on Admit:  4+=Very High











ELEANOR MAYNARD DO              2020 20:10

## 2020-11-18 NOTE — HISTORY & PHYSICAL-HOSPITALIST
BAINBRIDGE,LUKE MED STUDENT 20 1317:


History of Present Illness


HPI/Chief Complaint


Kiran Gibson is a 65 year old white male who presented to the ED with nausea,

vomiting, diarrhea, and worsening confusion.  His wife stated he had become 

increasingly confused over night. He has a history of recurrent small tania 

obstructions and bowel resections as a result of crohns disease. He states that 

these symptoms he's experiencing currently are different from symptoms he's had 

with previous admissions for SBO. He reports having difficulty keeping liquids 

and solids down for several days. He reports that he's had worsening diarrhea 

over the course of the last 3 weeks and isn't sure if he's been passing blood in

his stools. He has not tried any medications at home for the nausea, vomiting, 

or diarrhea. He also reports that he's been under extreme stress lately as one 

of his son's murdered somebody recently. Currently he complains of diffuse a

bdominal pain with palpation and right lower quadrant pain with deep palpation. 

He rates his abdominal pain at a 8/10 currently and describes it as 

constant/achy. He denies fevers, chills, shaking, SOB, chest pain, blurry 

vision, and headaches.


Source:  patient, RN/MD


Exam Limitations:  no limitations


Date Seen


20


Time Seen by a Provider:  08:30


Attending Physician


Mariah Urena DO


PCP


Simba Hernandez MD


Referring Physician





Date of Admission


2020 at 19:00





Home Medications & Allergies


Home Medications


Reviewed patient Home Medication Reconciliation performed by pharmacy medication

reconciliations technician and/or nursing.


Patients Allergies have been reviewed.





Allergies





Allergies


Coded Allergies


  Penicillins (Verified Allergy, Severe, EDEMA, 20)


  adhesive tape (Verified Allergy, Severe, RASH, 20)


  cefazolin (Verified Allergy, Severe, RASH, 20)


  cephalexin (Verified Allergy, Severe, RASH, 20)


  morphine (Verified Allergy, Severe, EDEMA, takes Hydromorphone at home, 

20)


  butabarbital (Verified Allergy, Unknown, 20)


  butorphanol (Verified Allergy, Unknown, 20)


  ibuprofen (Verified Allergy, Unknown, 20)


  piperacillin (Verified Allergy, Unknown, EDEMA, 20)


  promethazine (Verified Allergy, Unknown, 20)


  silicone (Verified Allergy, Unknown, 20)


  zolpidem (Verified Allergy, Unknown, 20)


  ofloxacin (Verified Adverse Reaction, Unknown, DIARRHEA, 20)








Past Medical-Social-Family Hx


Past Med/Social Hx:  Reviewed Nursing Past Med/Soc Hx


Patient Social History


Marrital Status:  


Number of Children:  3


Number of living children:  3


Employed/Student:  unemployed (Disability for 30 years plus)


Alcohol Use:  Denies Use


Recreational Drug Use:  No


Smoking Status:  Never a Smoker


2nd Hand Smoke Exposure:  No


Recent Foreign Travel:  No


Contact w/other who traveled:  No


Recent Hopitalizations:  No


Recent Infectious Disease Expo:  No





Immunizations Up To Date


Date of Influenza Vaccine:  Oct 3, 2019





Seasonal Allergies


Seasonal Allergies:  No





Past Medical History


Surgeries:  Appendectomy, Bowel Surgery, Eye Surgery, Gallbladder


Respiratory:  Pulmonary Embolism


Currently Using CPAP:  No


Currently Using BIPAP:  No


Neurological:  Stroke (brainstem CVA 2 years ago)


Gastrointestinal:  Crohns Disease, Obstructive Bowel


Endocrine:  Hypothyroidsim


HEENT:  Cataract


Loss of Vision:  Denies


Hearing Impairment:  Denies


History of Blood Disorders:  No





Family History


Asthma (DAD)





Review of Systems


Constitutional:  no symptoms reported; No chills, No diaphoresis, No fever


EENTM:  no symptoms reported; No hearing loss, No blurred vision, No double 

vision


Respiratory:  no symptoms reported; No cough, No dyspnea on exertion, No 

hemoptysis, No short of breath


Cardiovascular:  no symptoms reported; No chest pain, No edema, No palpitations


Gastrointestinal:  RLQ, see HPI, abdominal pain, diarrhea; No hematemesis; 

nausea, vomiting


Genitourinary:  no symptoms reported; No decreased output, No discharge, No 

dysuria, No frequency


Musculoskeletal:  no symptoms reported; No back pain, No joint pain


Skin:  no symptoms reported; No pruritus, No rash


Psychiatric/Neurological:  No Symptoms Reported; Denies Depressed, Denies 

Headache, Denies Numbness; Pre-Existing Deficit (loss of pain and temp sensatio 

right side body s/p stroke)


All Other Systems Reviewed


Negative Unless Noted:  Yes





Physical Exam


Physical Exam


Vital Signs





Vital Signs - First Documented








 20





 13:50


 


Temp 36.9


 


Pulse 67


 


Resp 16


 


B/P (MAP) 151/73 (99)


 


Pulse Ox 99


 


O2 Delivery Room Air





Capillary Refill : Less Than 3 Seconds


Height, Weight, BMI


Height: '"


Weight: lbs. oz. kg; 27.50 BMI


Method:


General Appearance:  No Apparent Distress, WD/WN


Eyes:  Bilateral Eye EOMI


HEENT:  PERRL/EOMI, Pharynx Normal


Neck:  Full Range of Motion, Non Tender


Respiratory:  Chest Non Tender, Lungs Clear, Normal Breath Sounds, No Accessory 

Muscle Use


Cardiovascular:  Regular Rate, Rhythm, Normal Peripheral Pulses


Gastrointestinal:  Normal Bowel Sounds, Soft; No Guarding, No Rebound; 

Tenderness (diffusely tender to palpation. RLQ worse with deep palpation)


Rectal:  Deferred


Back:  Normal Inspection, No Vertebral Tenderness


Extremity:  Normal Capillary Refill, Non Tender, No Calf Tenderness


Neurologic/Psychiatric:  Alert, Oriented x3, No Motor/Sensory Deficits, Normal 

Mood/Affect


Skin:  Normal Color, Warm/Dry


Lymphatic:  No Adenopathy





Results


Results/Procedures


Labs


Laboratory Tests


20 14:35








20 03:40








Patient resulted labs reviewed.





Assessment/Plan


Admission Diagnosis


Nausea, vomiting, diarrhea


Acute kidney injury


hyperkalemia


Admission Status:  Inpatient Order (span 2 midnights)


Reason for Inpatient Admission:  


Pt. requiring Intravenouus fluids, antiemetics, pain meds, and fluid and


electrolyte imbalance





Assessment and Plan


Nausea


Vomiting


Diarrhea


CHARO


Hyperkalemia-resolved


Anemia


Crohn's disease


H/O SBO's S/P bowel resections


Hypothyroidism





1. Normal saline at 100ml/hr


2. Continue home meds


3. General surgery consult- drop in H an H


4. Antiemetics for nausea/vomiting


5. lovenox dvt prophylaxis


6. Iron sucrose IV


7. Transfuse 1 unit PRBC for Hgb of 7.5





Clinical Quality Measures


DVT/VTE Risk/Contraindication:


Risk Factor Score Per Nursin


RFS Level Per Nursing on Admit:  4+=Very High





MARIAH URENA DO 20 0606:


History of Present Illness


HPI/Chief Complaint


CC: Abdominal pain with acute renal failure


HPI: This is a 65yoWM history of chronic abdominal pain with Crohns takes 

Dilaudid 8mg TID for pain who presents to the hospital with acute renal failure 

with creatinine of 3.04, Hgb 9.0 and abdominal pain with nausea and vomiting. CT

scan did not show any evidence of any type of small bowel obstruction but he has

had multiple resection in the past and bowel obstruction. Dr. You has been 

consulted, he feels like he is in a Crohns flare, will initiate some steroids 

and Hgb dropped to 7.5 with IV fluids with Creatinine of 2.46 improved so I will

give him one unit of blood. Overall he feels about the same but he is producing 

urine.





Past Medical-Social-Family Hx


Past Med/Social Hx:  Reviewed Nursing Past Med/Soc Hx, Reviewed and Corrections 

made





Review of Systems


Constitutional:  see HPI





Physical Exam


Physical Exam


General Appearance:  No Apparent Distress, Chronically ill


Respiratory:  Lungs Clear


Cardiovascular:  Regular Rate, Rhythm


Neurologic/Psychiatric:  Alert, Oriented x3, No Motor/Sensory Deficits, Normal 

Mood/Affect





Assessment/Plan


Admission Diagnosis


Assessment:


ARF


Anemia


Crohn's flare





PLan:


Transfuse


IVF


Monitor output


Admission Status:  Inpatient Order (span 2 midnights)


Reason for Inpatient Admission:  


severe anemia with arf





Diagnosis/Problems


Diagnosis/Problems





(1) Acute kidney injury


Status:  Acute


(2) Nausea vomiting and diarrhea


Status:  Acute


(3) Hyperkalemia


Status:  Acute





Supervisory-Addendum Brief


Verification & Attestation


Participated in pt care:  history, MDM, physical


Personally performed:  exam, history, MDM, supervision of care


Care discussed with:  Medical Student


Procedures:  n/a


Results interpretation:  Verified all documentation


Verification and Attestation of Medical Student E/M Service





A medical student performed and documented this service in my presence. I 

reviewed and verified all information documented by the medical student and made

modifications to such information, when appropriate. I personally performed the 

physical exam and medical decision making. 





 Mariah Urena, 2020,06:03











BAINBRIDGE,LUKE MED STUDENT    2020 13:17


MARIAH URENA DO                2020 06:06

## 2020-11-18 NOTE — NUR
SPOKE WITH THE PT, WENT THRU THE EXT MED HISTORY, CALLED [facility] AND GOT A MED LIST AND 
CALLED [facility] TO COMPLETE THE MED REC



THE FOLLOWING ARE FILL DATES NOT SHOWN ON THE EXT MED HISTORY:

10- VENLAFAXINE ER 75MG #90/90DS 

10- TEMAZEPAM 15MG #56/28DS

11- CHOLESTYRAMINE 4GM # 1CAN

11- TERBINAFINE 250MG #90/90DS 

11- LYRICA 75MG #90/30DS

11- VENLAFAXINE LO352GZ #90/90DS



THE FOLLOWING MEDS WERE FILLED AT [facility] AND THEY DO SHOW ON THE EXT MED HISTORY AND LOOK 
TO BE PAST DUE- HOWEVER [facility] FILLED THEM IN APRIL FOR THEN MAY FOR 90 DAY SUPPLIES EACH 
TIME:

LISINOPRIL 5MG 

LEVOTHYROXINE 25MCG

SPIRONOLACTONE 50MG 

ALL THE ABOVE WERE FILLED ON 04- #90 AND THEN 05- #90



OTC MEDS:

MTV

TYLENOL

VISINE DRY EYE DROPS

## 2020-11-18 NOTE — NUR
PT NOTED TO BE CALLING OUT FOR HELP AT THIS TIME. PT WAS IN HIS BED CALLING OUT "HELP ME, 
HELP ME DADDY." THIS NURSE TRIED TO REORIENT PT. PT CONTINUED TO HOLLER OUT. THIS NURSE 
AROUSED THE PT BY GENTLY SHAKING PT SHOULDER, PT AWOKE AND STATED "I MUST HAVE BEEN HAVING A 
BAD DREAM." FENTANYL 50MCG HAD BEEN ADMINISTERED PRIOR TO THIS EVENT. WILL WATCH FOR ADVERSE 
SIDE EFFECTS OF MEDICATION AND PASS ONTO DAYSHIFT NURSE.

## 2020-11-19 VITALS — SYSTOLIC BLOOD PRESSURE: 132 MMHG | DIASTOLIC BLOOD PRESSURE: 70 MMHG

## 2020-11-19 VITALS — SYSTOLIC BLOOD PRESSURE: 128 MMHG | DIASTOLIC BLOOD PRESSURE: 74 MMHG

## 2020-11-19 VITALS — SYSTOLIC BLOOD PRESSURE: 152 MMHG | DIASTOLIC BLOOD PRESSURE: 73 MMHG

## 2020-11-19 VITALS — SYSTOLIC BLOOD PRESSURE: 129 MMHG | DIASTOLIC BLOOD PRESSURE: 82 MMHG

## 2020-11-19 VITALS — DIASTOLIC BLOOD PRESSURE: 58 MMHG | SYSTOLIC BLOOD PRESSURE: 130 MMHG

## 2020-11-19 VITALS — DIASTOLIC BLOOD PRESSURE: 57 MMHG | SYSTOLIC BLOOD PRESSURE: 115 MMHG

## 2020-11-19 LAB
ALBUMIN SERPL-MCNC: 3.6 GM/DL (ref 3.2–4.5)
ALP SERPL-CCNC: 78 U/L (ref 40–136)
ALT SERPL-CCNC: 8 U/L (ref 0–55)
BASOPHILS # BLD AUTO: 0 10^3/UL (ref 0–0.1)
BASOPHILS NFR BLD AUTO: 0 % (ref 0–10)
BILIRUB SERPL-MCNC: 0.6 MG/DL (ref 0.1–1)
BUN/CREAT SERPL: 15
CALCIUM SERPL-MCNC: 7.9 MG/DL (ref 8.5–10.1)
CHLORIDE SERPL-SCNC: 115 MMOL/L (ref 98–107)
CO2 SERPL-SCNC: 18 MMOL/L (ref 21–32)
CREAT SERPL-MCNC: 2.45 MG/DL (ref 0.6–1.3)
EOSINOPHIL # BLD AUTO: 0.2 10^3/UL (ref 0–0.3)
EOSINOPHIL NFR BLD AUTO: 3 % (ref 0–10)
GFR SERPLBLD BASED ON 1.73 SQ M-ARVRAT: 27 ML/MIN
GLUCOSE SERPL-MCNC: 90 MG/DL (ref 70–105)
HCT VFR BLD CALC: 26 % (ref 40–54)
HGB BLD-MCNC: 8.4 G/DL (ref 13.3–17.7)
LYMPHOCYTES # BLD AUTO: 1.1 10^3/UL (ref 1–4)
LYMPHOCYTES NFR BLD AUTO: 19 % (ref 12–44)
MANUAL DIFFERENTIAL PERFORMED BLD QL: NO
MCH RBC QN AUTO: 31 PG (ref 25–34)
MCHC RBC AUTO-ENTMCNC: 33 G/DL (ref 32–36)
MCV RBC AUTO: 95 FL (ref 80–99)
MONOCYTES # BLD AUTO: 0.6 10^3/UL (ref 0–1)
MONOCYTES NFR BLD AUTO: 10 % (ref 0–12)
NEUTROPHILS # BLD AUTO: 3.9 10^3/UL (ref 1.8–7.8)
NEUTROPHILS NFR BLD AUTO: 67 % (ref 42–75)
PLATELET # BLD: 189 10^3/UL (ref 130–400)
PMV BLD AUTO: 10.1 FL (ref 9–12.2)
POTASSIUM SERPL-SCNC: 4.4 MMOL/L (ref 3.6–5)
PROT SERPL-MCNC: 5.7 GM/DL (ref 6.4–8.2)
SODIUM SERPL-SCNC: 143 MMOL/L (ref 135–145)
WBC # BLD AUTO: 5.9 10^3/UL (ref 4.3–11)

## 2020-11-19 RX ADMIN — TERBINAFINE HYDROCHLORIDE SCH MG: 250 TABLET ORAL at 11:21

## 2020-11-19 RX ADMIN — PREGABALIN SCH MG: 75 CAPSULE ORAL at 13:05

## 2020-11-19 RX ADMIN — DOCUSATE SODIUM AND SENNOSIDES SCH EA: 8.6; 5 TABLET, FILM COATED ORAL at 21:16

## 2020-11-19 RX ADMIN — ONDANSETRON PRN MG: 2 INJECTION, SOLUTION INTRAMUSCULAR; INTRAVENOUS at 21:15

## 2020-11-19 RX ADMIN — PREGABALIN SCH MG: 75 CAPSULE ORAL at 09:19

## 2020-11-19 RX ADMIN — METHADONE HYDROCHLORIDE SCH MG: 10 TABLET ORAL at 18:07

## 2020-11-19 RX ADMIN — VENLAFAXINE HYDROCHLORIDE SCH MG: 75 CAPSULE, EXTENDED RELEASE ORAL at 09:19

## 2020-11-19 RX ADMIN — Medication SCH EA: at 06:21

## 2020-11-19 RX ADMIN — PREGABALIN SCH MG: 75 CAPSULE ORAL at 21:15

## 2020-11-19 RX ADMIN — SODIUM CHLORIDE, SODIUM LACTATE, POTASSIUM CHLORIDE, AND CALCIUM CHLORIDE SCH MLS/HR: 600; 310; 30; 20 INJECTION, SOLUTION INTRAVENOUS at 11:20

## 2020-11-19 RX ADMIN — SODIUM CHLORIDE, SODIUM LACTATE, POTASSIUM CHLORIDE, AND CALCIUM CHLORIDE SCH MLS/HR: 600; 310; 30; 20 INJECTION, SOLUTION INTRAVENOUS at 21:38

## 2020-11-19 RX ADMIN — LEVOTHYROXINE SODIUM SCH MCG: 25 TABLET ORAL at 06:20

## 2020-11-19 RX ADMIN — METHADONE HYDROCHLORIDE SCH MG: 10 TABLET ORAL at 06:21

## 2020-11-19 RX ADMIN — AZATHIOPRINE SCH MG: 50 TABLET ORAL at 09:19

## 2020-11-19 RX ADMIN — FENTANYL CITRATE PRN MCG: 50 INJECTION, SOLUTION INTRAMUSCULAR; INTRAVENOUS at 02:52

## 2020-11-19 RX ADMIN — METHADONE HYDROCHLORIDE SCH MG: 10 TABLET ORAL at 11:20

## 2020-11-19 RX ADMIN — DOCUSATE SODIUM AND SENNOSIDES SCH EA: 8.6; 5 TABLET, FILM COATED ORAL at 09:27

## 2020-11-19 NOTE — NUR
DR MAYNARD NOTIFIED OF HEMOCCULT STOOL.  GI PROPHYLAXIS MEDICATION ORDERED AT THIS TIME.  
PATIENT EDUCATED.  DENIES NEEDS OR C/O . CONT TO MONITOR.

## 2020-11-19 NOTE — NUR
ZOFRAN FOR C/O NAUSEA.  PATIENT REPORTS NOT HAD NAUSEA SINCE LAST NIGHT.  REFUSED STOOL 
SOFTENERS AT THIS TIME.  HYDROCODONE GIVEN FOR C/O PAIN 5/10 IN ABDOMINAL REGION; NOTHING 
NEW, CHRONIC PAIN PATIENT REPORTED.  CONT TO MONITOR.

## 2020-11-19 NOTE — NUR
RD ASSESSMENT 



PMHx: PE; stroke; Crohn's disease; obstructive bowel; hx of bowel resections; 



PT INTERACTION: Pt was awake and pleasant during nutrition assessment. Note pt has AMS, per 
chart review. Pt states current appetite is "better this morning." Note avg PO intake 100% 
x1d, per chart review. Pt states following a low-fiber diet at home, and has no issues with 
chewing/swallowing food. Note pt has dentures, but is not wearing them. Pt states recent 
issues with nausea, vomiting, and diarrhea. Note last BM was 11/18, and pt currently on 
bowel regimen of senna BID, per chart review. Pt states recent wt fluctuations "about 10# up 
and down." Note recent 11# wt loss x5mon, per chart review.



ABNORMAL NUTRITION-RELATED LAB VALUES

LOW:  Ca 7.9; Pro 5.7; 

HIGH: Cl 115; BUN 37; cr 2.45; 



Est. kcal needs: 5544-3022 kcal | 20-25 kcal/kg 

Est. Pro needs:  78-97 g Pro | 0.8-1.0 g Pro/kg 



PES STATEMENT: Given current PO intake, no nutrition diagnosis at this time (NO-1.1). 



INTERVENTION:  

Continue with current diet order of Regular diet. Offered to alter diet consistency as pt 
does not have dentures in, but pt declined at this time. 

DC current supplementation order of Ensure Enlive with meals TID. Pt is currently consuming 
>75% meals. 

Will continue to follow and reassess as pt needs, intake, and status change. 





LIDA Carlson, MS RD LD

## 2020-11-19 NOTE — NUR
NOTING PATIENTS REFUSAL OF PEPCID, THIS NURSE EXPLAINED TO PT THE REASONING FOR TAKING 
PEPCID MEDICATION, PT AWARE OF OCCULT BLOOD IN STOOL, CONTINUES TO REFUSE PEPCID MEDICATION, 
STATES REASONING FOR REFUSING MEDICATION AS HE "KNOWS HOW IT MAKES ME FEEL" PT REFUSED 
MEDICATION.

## 2020-11-19 NOTE — NUR
SCHEDULED HEP 5000 Q8H PLACED ON HOLD AT THIS TIME PER DR URENA.   AWAITING OB STOOL 
COLLECTION FROM PATIENT.  PATIENT EDUCATED ON SAMPLE NEED AND PURPOSE.  VERBALIZED 
UNDERSTANDING.  DENIES NEEDS OR C/O AT THIS TIME.  STATES HE FEELS 'MUCH STRONGER' TODAY 
AFTER RECEIVING BLOOD TRANSFUSION YESTERDAY. CONT TO MONITOR.

## 2020-11-19 NOTE — DIAGNOSTIC IMAGING REPORT
PROCEDURE: US Renal Bilateral.



TECHNIQUE: Multiple real-time grayscale images were obtained over

the kidneys in various projections bilaterally.



INDICATION: Renal failure.



Right kidney measures 13.4 x 5.4 x 5.4 cm and the left kidney

measures 11.9 x 5.2 x 6.3 cm. Cortical thickness and echogenicity

is normal. No calculi are seen. There is no hydronephrosis.

Prevoid bladder volume is 224 mL. Postvoid volume is 4 mL. The

right ureteral jet was not visualized. Left ureteral jet is

visualized.



IMPRESSION: Unremarkable renal and bladder ultrasound.



Dictated by: 



  Dictated on workstation # [id number]

## 2020-11-19 NOTE — NUR
ZOFRAN EFFECTIVE. PATIENT HAD MODERATE BROWN, FORMED STOOL.  SAMPLE SENT TO LAB.  DENIES 
NEEDS OR C/O

## 2020-11-19 NOTE — PROGRESS NOTE - SURGERY
STEVE RACHEL MED STUDENT 20 1453:


Subjective


Date Seen by a Provider:  2020


Time Seen by a Provider:  07:00


Subjective/Events-last exam


Pt states his pain is improved and has not had diarrhea since yesterday morning.

Hemoglobin is 8.4 today from 7.5. Transfused 1 unit yesterday.





Objective


Exam





Vital Signs








  Date Time  Temp Pulse Resp B/P (MAP) Pulse Ox O2 Delivery O2 Flow Rate FiO2


 


20 12:00 36.4 70 16 130/58 (82) 96 Room Air  


 


20 08:00 36.7 59 16 115/57 (76) 95 Room Air  


 


20 07:56      Room Air  


 


20 04:06 36.2 74 21 128/74 (92) 100 Room Air  


 


20 23:46 36.8 70 20 145/73 (97) 100 Room Air  


 


20 20:40 36.1 63 18 148/77 98 Room Air  


 


20 20:40 36.1 63 18 148/77 (100) 98 Room Air  


 


20 20:10      Room Air  


 


20 18:43 36.1 75 20 154/74 98 Room Air  


 


20 18:20 36.6 80 20 152/70 100 Room Air  


 


20 18:13 36.6 80 20 152/70 (97) 100 Room Air  


 


20 15:52 36.4 77 20 132/60 (84) 97 Room Air  














I & O 


 


 20





 07:00


 


Intake Total 3305 ml


 


Output Total 2750 ml


 


Balance 555 ml





Capillary Refill : Less Than 3 Seconds


General Appearance:  No Apparent Distress, WD/WN


HEENT:  PERRL/EOMI, Normal ENT Inspection


Neck:  Normal Inspection, Non Tender


Respiratory:  Chest Non Tender, No Accessory Muscle Use, No Respiratory Distress


Cardiovascular:  Regular Rate, Rhythm, No Edema


Gastrointestinal:  non tender, soft; No guarding, No rebound


Extremity:  Non Tender, No Calf Tenderness, No Pedal Edema


Neurologic/Psychiatric:  Alert, Oriented x3, No Motor/Sensory Deficits, Normal 

Mood/Affect


Skin:  Warm/Dry, Pallor


Lymphatic:  No Adenopathy





Results


Lab


Laboratory Tests


20 05:45: 


White Blood Count 5.9, Red Blood Count 2.71L, Hemoglobin 8.4L, Hematocrit 26L, 

Mean Corpuscular Volume 95, Mean Corpuscular Hemoglobin 31, Mean Corpuscular 

Hemoglobin Concent 33, Red Cell Distribution Width 13.6, Platelet Count 189, 

Mean Platelet Volume 10.1, Immature Granulocyte % (Auto) 1, Neutrophils (%) 

(Auto) 67, Lymphocytes (%) (Auto) 19, Monocytes (%) (Auto) 10, Eosinophils (%) 

(Auto) 3, Basophils (%) (Auto) 0, Neutrophils # (Auto) 3.9, Lymphocytes # (Auto)

1.1, Monocytes # (Auto) 0.6, Eosinophils # (Auto) 0.2, Basophils # (Auto) 0.0, 

Immature Granulocyte # (Auto) 0.0, Sodium Level 143, Potassium Level 4.4, 

Chloride Level 115H, Carbon Dioxide Level 18L, Anion Gap 10, Blood Urea Nitrogen

37H, Creatinine 2.45H, Estimat Glomerular Filtration Rate 27, BUN/Creatinine 

Ratio 15, Glucose Level 90, Calcium Level 7.9L, Corrected Calcium 8.2L, Total 

Bilirubin 0.6, Aspartate Amino Transf (AST/SGOT) 10, Alanine Aminotransferase 

(ALT/SGPT) 8, Alkaline Phosphatase 78, Total Protein 5.7L, Albumin 3.6


20 10:16: Stool Occult Blood Immunoassay POSITIVEH


20 13:16: Lab Scanned Report Transfusion Reaction Form





Assessment/Plan


Nausea/Vomiting/Diarrhea


CHARO


Hyperkalemia-improved


Anemia


Crohn's disease


H/O SBO's S/P bowel resections





Follow hemoglobin transfuse as needed


We will continue fluid resuscitation.


Pt states similar episodes of flareups of Crohn's like this, will consider 

starting steroids if symptoms do not improve


Symptomatic control


No surgical intervention at this time





Clinical Quality Measures


DVT/VTE Risk/Contraindication:


Risk Factor Score Per Nursin


RFS Level Per Nursing on Admit:  4+=Very High





ELEANOR YOU DO 20:


Subjective


Subjective/Events-last exam


Feeling better today.  No diarrhea.  Minimal nausea appearing feeling stronger 

after receiving 1 unit of packed red blood cells yesterday.  Abdominal pain has 

improved.  No new complaints.  Denies fever sweats chills shortness of breath or

chest pain.





Objective


Exam


General Appearance:  No Apparent Distress, WD/WN


HEENT:  PERRL/EOMI, Normal ENT Inspection


Neck:  Normal Inspection, Non Tender


Respiratory:  Chest Non Tender, No Accessory Muscle Use, No Respiratory Distress


Cardiovascular:  Regular Rate, Rhythm, No Edema


Gastrointestinal:  non tender, soft; No guarding, No rebound


Extremity:  Non Tender, No Calf Tenderness


Neurologic/Psychiatric:  Alert, Oriented x3, No Motor/Sensory Deficits, Normal 

Mood/Affect


Skin:  Warm/Dry, Pallor


Lymphatic:  No Adenopathy





Assessment/Plan


Nausea/Vomiting/Diarrhea


CHARO


Hyperkalemia-improved


Anemia


Crohn's disease


H/O SBO's S/P bowel resections





Follow hemoglobin transfuse as needed, feeling better after 1 unit PRBC


We will continue fluid resuscitation due to acute kidney injury


Pt states similar episodes of flareups of Crohn's like this, will consider 

starting steroids if symptoms do not improve


Symptomatic control


No surgical intervention at this time





Supervisory-Addendum Brief


Verification & Attestation


Participated in pt care:  history, MDM, physical


Personally performed:  exam, history, MDM, supervision of care


Care discussed with:  Medical Student


Procedures:  n/a


Results interpretation:  Verified all documentation


Verification and Attestation of Medical Student E/M Service





A medical student performed and documented this service in my presence. I 

reviewed and verified all information documented by the medical student and made

modifications to such information, when appropriate. I personally performed the 

physical exam and medical decision making. 





 Eleanor You, 2020,22:10











STEVE RACHEL MED STUDENT     2020 14:53


ELEANOR YOU DO              2020 22:10

## 2020-11-19 NOTE — PROGRESS NOTE - HOSPITALIST
BAINBRIDGE,LUKE MED STUDENT 20 1133:


Subjective


HPI/CC On Admission


Date Seen by Provider:  2020


Time Seen by Provider:  08:45


CC: Abdominal pain with acute renal failure


HPI: This is a 65yoWM history of chronic abdominal pain with Crohns takes 

Dilaudid 8mg TID for pain who presents to the hospital with acute renal failure 

with creatinine of 3.04, Hgb 9.0 and abdominal pain with nausea and vomiting. CT

scan did not show any evidence of any type of small bowel obstruction but he has

had multiple resection in the past and bowel obstruction. Dr. You has been 

consulted, he feels like he is in a Crohns flare, will initiate some steroids 

and Hgb dropped to 7.5 with IV fluids with Creatinine of 2.46 improved so I will

give him one unit of blood. Overall he feels about the same but he is producing 

urine.


Subjective/Events-last exam


Patient reports feeling quite improved today. He's denying any abdominal pain or

right leg pain. States the pain meds are working "wonders". He denies having any

more episodes of diarrhea and that his last BM was yesterday morning. Tolerating

po intake well without vomiting, some mild nausea reported though.





Review of Systems


General:  No Chills, No Night Sweats; Fatigue


HEENT:  No Head Aches, No Visual Changes


Pulmonary:  No Dyspnea, No Cough


Cardiovascular:  No: Chest Pain, Palpitations, Orthopnea


Gastrointestinal:  Nausea; No: Vomiting, Abdominal Pain, Diarrhea


Genitourinary:  No Dysuria, No Frequency


Musculoskeletal:  No: neck pain, back pain


Neurological:  No: Weakness, Numbness





Objective


Exam


Vital Signs





Vital Signs








  Date Time  Temp Pulse Resp B/P (MAP) Pulse Ox O2 Delivery O2 Flow Rate FiO2


 


20 08:00 36.7 59 16 115/57 (76) 95 Room Air  





Capillary Refill : Less Than 3 Seconds


General Appearance:  No Apparent Distress, WD/WN


HEENT:  PERRL/EOMI, Pharynx Normal


Neck:  Full Range of Motion, Normal Inspection, Non Tender


Respiratory:  Chest Non Tender, Lungs Clear, No Accessory Muscle Use, No 

Respiratory Distress, Decreased Breath Sounds


Cardiovascular:  Regular Rate, Rhythm, Normal Peripheral Pulses


Gastrointestinal:  Normal Bowel Sounds, Non Tender, Soft; No Distended, No 

Guarding, No Rebound, No Tenderness


Rectal:  Deferred


Back:  Normal Inspection, No Vertebral Tenderness; No Muscle Spasm


Extremity:  Normal Capillary Refill, Non Tender, No Calf Tenderness, No Pedal 

Edema


Neurologic/Psychiatric:  Alert, Oriented x3, No Motor/Sensory Deficits, Normal 

Mood/Affect


Skin:  Normal Color, Warm/Dry


Lymphatic:  No Adenopathy





Results/Procedures


Lab


Laboratory Tests


20 05:45








Patient resulted labs reviewed.





Assessment/Plan


Assessment and Plan


Assess & Plan/Chief Complaint


Nausea- continues but improved


Vomiting- resolved


Diarrhea- resolved


CHARO


Hyperkalemia-resolved


Anemia


Crohn's disease


H/O SBO's S/P bowel resections


Hypothyroidism





1. Lactated ringers at 100ml/hr


2. Continue home meds


3. Order renal ultrasound today- for acute renal failure


4. Antiemetics for nausea/vomiting


5. Iron sucrose IV


6. Follow hemoglobin and hematocrit


7. General surgery following


8. Plan for discharge tomorrow possibly with close followup appt to trend 

creatinine.





Clinical Quality Measures


DVT/VTE Risk/Contraindication:


Risk Factor Score Per Nursin


RFS Level Per Nursing on Admit:  4+=Very High





MARIAH URENA DO 20 0456:


Subjective


Subjective/Events-last exam


Pt feels pretty good 


Urinary output is good 


Will restart IV fluids because creatinine is 2.4 and back in July it was 0.97 


Renal ultrasound will also be obtained 


Holding Heparin due to severe anemia 


No abdominal pain 


Appetite is increased 


Hgb 8.4 after one unit of blood 


Getting IV iron infusion


Review of Systems


General:  Fatigue





Objective


Exam


General Appearance:  No Apparent Distress, WD/WN, Chronically ill


Respiratory:  Lungs Clear


Cardiovascular:  Regular Rate, Rhythm





Assessment/Plan


Assessment and Plan


Assess & Plan/Chief Complaint


CHeck renal USG


Monitor creat





Supervisory-Addendum Brief


Verification & Attestation


Participated in pt care:  history, MDM, physical


Personally performed:  exam, history, MDM, supervision of care


Care discussed with:  Medical Student


Procedures:  n/a


Results interpretation:  Verified all documentation


Verification and Attestation of Medical Student E/M Service





A medical student performed and documented this service in my presence. I 

reviewed and verified all information documented by the medical student and made

modifications to such information, when appropriate. I personally performed the 

physical exam and medical decision making. 





 Mariah Urena 2020,04:54











BAINBRIDGE,LUKE MED STUDENT    2020 11:33


MARIAH URENA DO                2020 04:56

## 2020-11-20 VITALS — DIASTOLIC BLOOD PRESSURE: 83 MMHG | SYSTOLIC BLOOD PRESSURE: 145 MMHG

## 2020-11-20 VITALS — DIASTOLIC BLOOD PRESSURE: 67 MMHG | SYSTOLIC BLOOD PRESSURE: 148 MMHG

## 2020-11-20 VITALS — SYSTOLIC BLOOD PRESSURE: 137 MMHG | DIASTOLIC BLOOD PRESSURE: 62 MMHG

## 2020-11-20 VITALS — DIASTOLIC BLOOD PRESSURE: 72 MMHG | SYSTOLIC BLOOD PRESSURE: 141 MMHG

## 2020-11-20 LAB
ALBUMIN SERPL-MCNC: 3.4 GM/DL (ref 3.2–4.5)
ALP SERPL-CCNC: 71 U/L (ref 40–136)
ALT SERPL-CCNC: < 6 U/L (ref 0–55)
BILIRUB SERPL-MCNC: 0.4 MG/DL (ref 0.1–1)
BUN/CREAT SERPL: 15
CALCIUM SERPL-MCNC: 7.6 MG/DL (ref 8.5–10.1)
CHLORIDE SERPL-SCNC: 113 MMOL/L (ref 98–107)
CO2 SERPL-SCNC: 21 MMOL/L (ref 21–32)
CREAT SERPL-MCNC: 2.2 MG/DL (ref 0.6–1.3)
GFR SERPLBLD BASED ON 1.73 SQ M-ARVRAT: 30 ML/MIN
GLUCOSE SERPL-MCNC: 81 MG/DL (ref 70–105)
HCT VFR BLD CALC: 25 % (ref 40–54)
HGB BLD-MCNC: 8.1 G/DL (ref 13.3–17.7)
MCH RBC QN AUTO: 31 PG (ref 25–34)
MCHC RBC AUTO-ENTMCNC: 32 G/DL (ref 32–36)
MCV RBC AUTO: 95 FL (ref 80–99)
PLATELET # BLD: 159 10^3/UL (ref 130–400)
PMV BLD AUTO: 10.3 FL (ref 9–12.2)
POTASSIUM SERPL-SCNC: 4.3 MMOL/L (ref 3.6–5)
PROT SERPL-MCNC: 5.5 GM/DL (ref 6.4–8.2)
SODIUM SERPL-SCNC: 144 MMOL/L (ref 135–145)
WBC # BLD AUTO: 5.1 10^3/UL (ref 4.3–11)

## 2020-11-20 RX ADMIN — HYDROMORPHONE HYDROCHLORIDE PRN MG: 4 TABLET ORAL at 15:11

## 2020-11-20 RX ADMIN — Medication SCH EA: at 06:40

## 2020-11-20 RX ADMIN — HYDROMORPHONE HYDROCHLORIDE PRN MG: 4 TABLET ORAL at 09:03

## 2020-11-20 RX ADMIN — PREGABALIN SCH MG: 75 CAPSULE ORAL at 09:01

## 2020-11-20 RX ADMIN — TERBINAFINE HYDROCHLORIDE SCH MG: 250 TABLET ORAL at 09:01

## 2020-11-20 RX ADMIN — HYDROMORPHONE HYDROCHLORIDE PRN MG: 4 TABLET ORAL at 09:30

## 2020-11-20 RX ADMIN — SODIUM CHLORIDE SCH MG: 900 INJECTION, SOLUTION INTRAVENOUS at 09:01

## 2020-11-20 RX ADMIN — METHADONE HYDROCHLORIDE SCH MG: 10 TABLET ORAL at 00:36

## 2020-11-20 RX ADMIN — VENLAFAXINE HYDROCHLORIDE SCH MG: 75 CAPSULE, EXTENDED RELEASE ORAL at 09:01

## 2020-11-20 RX ADMIN — METHADONE HYDROCHLORIDE SCH MG: 10 TABLET ORAL at 12:19

## 2020-11-20 RX ADMIN — LEVOTHYROXINE SODIUM SCH MCG: 25 TABLET ORAL at 06:40

## 2020-11-20 RX ADMIN — DOCUSATE SODIUM AND SENNOSIDES SCH EA: 8.6; 5 TABLET, FILM COATED ORAL at 09:03

## 2020-11-20 RX ADMIN — METHADONE HYDROCHLORIDE SCH MG: 10 TABLET ORAL at 06:41

## 2020-11-20 RX ADMIN — SODIUM CHLORIDE, SODIUM LACTATE, POTASSIUM CHLORIDE, AND CALCIUM CHLORIDE SCH MLS/HR: 600; 310; 30; 20 INJECTION, SOLUTION INTRAVENOUS at 07:21

## 2020-11-20 RX ADMIN — AZATHIOPRINE SCH MG: 50 TABLET ORAL at 09:01

## 2020-11-20 NOTE — PROGRESS NOTE - SURGERY
STVEE RACHEL MED STUDENT 20 1324:


Subjective


Date Seen by a Provider:  2020


Time Seen by a Provider:  07:20


Subjective/Events-last exam


Denies N/V, still having minimal diffuse abdominal pain that he says is normal 

for him. Says he had diarrhea yesterday, but is able to eat okay. Hgb today is 

8.1, yesterday was 8.4.





Objective


Exam





Vital Signs








  Date Time  Temp Pulse Resp B/P (MAP) Pulse Ox O2 Delivery O2 Flow Rate FiO2


 


20 12:00 35.4 58 18 148/67 (94) 95 Room Air  


 


20 09:03 36.6       


 


20 08:00      Room Air  


 


20 08:00 36.1 61 18 137/62 (87) 97 Room Air  


 


20 04:45 36.2 65 19 145/83 (103) 92 Room Air  


 


20 23:39 36.5 60 18 129/82 (98) 94 Room Air  


 


20 19:55      Room Air  


 


20 19:26 36.2 62 16 152/73 (99) 95 Room Air  


 


20 15:38 36.4 66 18 132/70 (90) 96 Room Air  














I & O 


 


 20





 07:00


 


Intake Total 2580 ml


 


Output Total 2400 ml


 


Balance 180 ml





Capillary Refill : Less Than 3 Seconds


General Appearance:  No Apparent Distress, WD/WN, Chronically ill


HEENT:  PERRL/EOMI, Normal ENT Inspection


Neck:  Normal Inspection, Supple


Respiratory:  Chest Non Tender, No Accessory Muscle Use, No Respiratory Distress


Cardiovascular:  Regular Rate, Rhythm, No Edema


Gastrointestinal:  soft; No guarding, No rebound; other (mild diffuse 

tenderness)


Extremity:  Non Tender, No Calf Tenderness


Neurologic/Psychiatric:  Alert, Oriented x3, No Motor/Sensory Deficits, Normal 

Mood/Affect


Skin:  Warm/Dry, Pallor


Lymphatic:  No Adenopathy





Results


Lab


Laboratory Tests


20 05:20: 


White Blood Count 5.1, Red Blood Count 2.64L, Hemoglobin 8.1L, Hematocrit 25L, 

Mean Corpuscular Volume 95, Mean Corpuscular Hemoglobin 31, Mean Corpuscular 

Hemoglobin Concent 32, Red Cell Distribution Width 13.3, Platelet Count 159, 

Mean Platelet Volume 10.3, Sodium Level 144, Potassium Level 4.3, Chloride Level

113H, Carbon Dioxide Level 21, Anion Gap 10, Blood Urea Nitrogen 34H, Creatinine

2.20H, Estimat Glomerular Filtration Rate 30, BUN/Creatinine Ratio 15, Glucose 

Level 81, Calcium Level 7.6L, Corrected Calcium 8.1L, Total Bilirubin 0.4, 

Aspartate Amino Transf (AST/SGOT) 10, Alanine Aminotransferase (ALT/SGPT) < 6, 

Alkaline Phosphatase 71, Total Protein 5.5L, Albumin 3.4





Assessment/Plan


Nausea/Vomiting/Diarrhea


CHARO


Hyperkalemia-improved


Anemia


Crohn's disease


H/O SBO's S/P bowel resections





Feeling better after 1 unit PRBC


continue fluid resuscitation due to acute kidney injury


Pt states similar episodes of flareups of Crohn's like this, will consider 

starting steroids if symptoms do not improve


Symptomatic control


Would recommend follow up with his GI doctor in 


No surgical intervention at this time, stable for discharge from surgical 

perspective





Clinical Quality Measures


DVT/VTE Risk/Contraindication:


Risk Factor Score Per Nursin


RFS Level Per Nursing on Admit:  4+=Very High





ELEANOR MAYNARD DO 20 1519:


Assessment/Plan


SEE MY NOTE





Supervisory-Addendum Brief


Verification & Attestation


Participated in pt care:  history, MDM, physical


Personally performed:  exam, history, MDM, supervision of care


Care discussed with:  Medical Student


Procedures:  n/a


Results interpretation:  Verified all documentation


see my note











STEVE RACHEL MED STUDENT     2020 13:24


ELEANOR MAYNARD DO              2020 15:19

## 2020-11-20 NOTE — PROGRESS NOTE - SURGERY
Subjective


Date Seen by a Provider:  2020


Time Seen by a Provider:  13:04


Subjective/Events-last exam


Feeling better.  Still some diarrhea, but overall feeling better.  Tolerating 

diet. 


Pain undercontrol.  Denies fever sweats chills shortness of breath or chest 

pain.





Objective


Exam





Vital Signs








  Date Time  Temp Pulse Resp B/P (MAP) Pulse Ox O2 Delivery O2 Flow Rate FiO2


 


20 12:00 35.4 58 18 148/67 (94) 95 Room Air  


 


20 09:03 36.6       


 


20 08:00      Room Air  


 


20 08:00 36.1 61 18 137/62 (87) 97 Room Air  


 


20 04:45 36.2 65 19 145/83 (103) 92 Room Air  


 


20 23:39 36.5 60 18 129/82 (98) 94 Room Air  


 


20 19:55      Room Air  


 


20 19:26 36.2 62 16 152/73 (99) 95 Room Air  


 


20 15:38 36.4 66 18 132/70 (90) 96 Room Air  














I & O 


 


 20





 07:00


 


Intake Total 2580 ml


 


Output Total 2400 ml


 


Balance 180 ml





Capillary Refill : Less Than 3 Seconds


General Appearance:  No Apparent Distress, WD/WN, Chronically ill


HEENT:  PERRL/EOMI, Normal ENT Inspection


Neck:  Normal Inspection, Non Tender


Respiratory:  Lungs Clear


Cardiovascular:  Regular Rate, Rhythm


Gastrointestinal:  non tender, soft; No guarding, No rebound


Extremity:  Non Tender, No Calf Tenderness


Neurologic/Psychiatric:  Alert, Oriented x3, No Motor/Sensory Deficits, Normal 

Mood/Affect


Skin:  Warm/Dry, Pallor


Lymphatic:  No Adenopathy





Results


Lab


Laboratory Tests


20 13:16: Lab Scanned Report Transfusion Reaction Form


20 05:20: 


White Blood Count 5.1, Red Blood Count 2.64L, Hemoglobin 8.1L, Hematocrit 25L, 

Mean Corpuscular Volume 95, Mean Corpuscular Hemoglobin 31, Mean Corpuscular 

Hemoglobin Concent 32, Red Cell Distribution Width 13.3, Platelet Count 159, 

Mean Platelet Volume 10.3, Sodium Level 144, Potassium Level 4.3, Chloride Level

113H, Carbon Dioxide Level 21, Anion Gap 10, Blood Urea Nitrogen 34H, Creatinine

2.20H, Estimat Glomerular Filtration Rate 30, BUN/Creatinine Ratio 15, Glucose 

Level 81, Calcium Level 7.6L, Corrected Calcium 8.1L, Total Bilirubin 0.4, 

Aspartate Amino Transf (AST/SGOT) 10, Alanine Aminotransferase (ALT/SGPT) < 6, 

Alkaline Phosphatase 71, Total Protein 5.5L, Albumin 3.4





Assessment/Plan


Nausea/Vomiting/Diarrhea


CHRAO


Hyperkalemia-improved


Anemia


Crohn's disease


H/O SBO's S/P bowel resections





Follow hemoglobin transfuse as needed, feeling better after 1 unit PRBC


continue fluid resuscitation due to acute kidney injury


Pt states similar episodes of flareups of Crohn's like this, will consider 

starting steroids if symptoms do not improve


Symptomatic control


Would recommend follow up with his GI doctor in 


No surgical intervention at this time





Clinical Quality Measures


DVT/VTE Risk/Contraindication:


Risk Factor Score Per Nursin


RFS Level Per Nursing on Admit:  4+=Very High











ELEANOR MAYNARD DO              2020 13:08

## 2020-11-20 NOTE — PROGRESS NOTE
BAINBRIDGE,LUKE MED STUDENT 11/20/20 1055:


Progress Note


Hospital Course:





Kiran Gibson is a 65 year old white male who presented to the ED on November 17 with nausea, vomiting, diarrhea, and increasing confusion. He was found to 

have acute renal failure with a creatinine level of 3.04 and hyperkalemia. He 

has a history of long standing crohn's disease, multiple bowel resections, and 

multiple admissions for bowel obstructions. He was subsequently admitted to the 

medical floor. CT abd/pelvis showed moderate burden of stool, suggestive of 

constipation. General surgery was consulted for a drop in the hemoglobin and 

hematocrit and following along, no surgical intervention necessary. He was 

transfused with 1 unit prbc this hospital stay. He was resuscitated with 

intravenous fluids, pain management was instituted for the abdominal pain, and 

was given antiemetics for the nausea. His creatinine has decreased to 2.20 

today. He has been hemodynamically stable the entire hospital course and will be

discharged to home today with close followup on an outpatient basis with his pcp

as he needs close monitoring of kidney function.





MARIAH URENA DO 11/21/20 0612:


Supervisory-Addendum Brief


Verification & Attestation


Participated in pt care:  history, MDM, physical


Personally performed:  exam, history, MDM, supervision of care


Care discussed with:  Medical Student


Procedures:  n/a


Results interpretation:  Verified all documentation


Verification and Attestation of Medical Student E/M Service





A medical student performed and documented this service in my presence. I 

reviewed and verified all information documented by the medical student and made

modifications to such information, when appropriate. I personally performed the 

physical exam and medical decision making. 





 Mariah Urena, Nov 21, 2020,06:12











BAINBRIDGE,LUKE MED STUDENT    Nov 20, 2020 10:55


MARIAH URENA DO                Nov 21, 2020 06:12

## 2020-12-09 ENCOUNTER — HOSPITAL ENCOUNTER (OUTPATIENT)
Dept: HOSPITAL 75 - ER FS | Age: 65
Setting detail: OBSERVATION
LOS: 1 days | Discharge: HOME | End: 2020-12-10
Attending: FAMILY MEDICINE | Admitting: FAMILY MEDICINE
Payer: MEDICARE

## 2020-12-09 VITALS — BODY MASS INDEX: 27.19 KG/M2 | WEIGHT: 211.86 LBS | HEIGHT: 73.98 IN

## 2020-12-09 VITALS — DIASTOLIC BLOOD PRESSURE: 79 MMHG | SYSTOLIC BLOOD PRESSURE: 160 MMHG

## 2020-12-09 VITALS — SYSTOLIC BLOOD PRESSURE: 149 MMHG | DIASTOLIC BLOOD PRESSURE: 78 MMHG

## 2020-12-09 DIAGNOSIS — E86.0: ICD-10-CM

## 2020-12-09 DIAGNOSIS — Z88.0: ICD-10-CM

## 2020-12-09 DIAGNOSIS — D63.1: ICD-10-CM

## 2020-12-09 DIAGNOSIS — I26.99: ICD-10-CM

## 2020-12-09 DIAGNOSIS — D64.9: ICD-10-CM

## 2020-12-09 DIAGNOSIS — Z88.1: ICD-10-CM

## 2020-12-09 DIAGNOSIS — E03.9: ICD-10-CM

## 2020-12-09 DIAGNOSIS — Z88.8: ICD-10-CM

## 2020-12-09 DIAGNOSIS — Z88.5: ICD-10-CM

## 2020-12-09 DIAGNOSIS — N17.9: ICD-10-CM

## 2020-12-09 DIAGNOSIS — J45.909: ICD-10-CM

## 2020-12-09 DIAGNOSIS — Z79.899: ICD-10-CM

## 2020-12-09 DIAGNOSIS — Z91.048: ICD-10-CM

## 2020-12-09 DIAGNOSIS — K50.90: ICD-10-CM

## 2020-12-09 DIAGNOSIS — R10.84: Primary | ICD-10-CM

## 2020-12-09 DIAGNOSIS — K56.7: ICD-10-CM

## 2020-12-09 DIAGNOSIS — N18.30: ICD-10-CM

## 2020-12-09 LAB
ALBUMIN SERPL-MCNC: 4.2 GM/DL (ref 3.2–4.5)
ALP SERPL-CCNC: 106 U/L (ref 40–136)
ALT SERPL-CCNC: 6 U/L (ref 0–55)
APTT PPP: YELLOW S
BACTERIA #/AREA URNS HPF: (no result) /HPF
BASOPHILS # BLD AUTO: 0 10^3/UL (ref 0–0.1)
BASOPHILS NFR BLD AUTO: 0 % (ref 0–10)
BILIRUB SERPL-MCNC: 0.5 MG/DL (ref 0.1–1)
BILIRUB UR QL STRIP: NEGATIVE
BUN/CREAT SERPL: 16
CALCIUM SERPL-MCNC: 8.7 MG/DL (ref 8.5–10.1)
CHLORIDE SERPL-SCNC: 105 MMOL/L (ref 98–107)
CO2 SERPL-SCNC: 21 MMOL/L (ref 21–32)
CREAT SERPL-MCNC: 2.06 MG/DL (ref 0.6–1.3)
EOSINOPHIL # BLD AUTO: 0 10^3/UL (ref 0–0.3)
EOSINOPHIL NFR BLD AUTO: 1 % (ref 0–10)
FIBRINOGEN PPP-MCNC: CLEAR MG/DL
GFR SERPLBLD BASED ON 1.73 SQ M-ARVRAT: 33 ML/MIN
GLUCOSE SERPL-MCNC: 102 MG/DL (ref 70–105)
GLUCOSE UR STRIP-MCNC: NEGATIVE MG/DL
HCT VFR BLD CALC: 26 % (ref 40–54)
HGB BLD-MCNC: 8.6 G/DL (ref 13.3–17.7)
KETONES UR QL STRIP: NEGATIVE
LEUKOCYTE ESTERASE UR QL STRIP: NEGATIVE
LIPASE SERPL-CCNC: 34 U/L (ref 8–78)
LYMPHOCYTES # BLD AUTO: 0.9 X 10^3 (ref 1–4)
LYMPHOCYTES NFR BLD AUTO: 16 % (ref 12–44)
MANUAL DIFFERENTIAL PERFORMED BLD QL: NO
MCH RBC QN AUTO: 31 PG (ref 25–34)
MCHC RBC AUTO-ENTMCNC: 33 G/DL (ref 32–36)
MCV RBC AUTO: 93 FL (ref 80–99)
MONOCYTES # BLD AUTO: 0.3 X 10^3 (ref 0–1)
MONOCYTES NFR BLD AUTO: 6 % (ref 0–12)
NEUTROPHILS # BLD AUTO: 4.1 X 10^3 (ref 1.8–7.8)
NEUTROPHILS NFR BLD AUTO: 77 % (ref 42–75)
NITRITE UR QL STRIP: NEGATIVE
PH UR STRIP: 5.5 [PH] (ref 5–9)
PLATELET # BLD: 165 10^3/UL (ref 130–400)
PMV BLD AUTO: 10.2 FL (ref 7.4–10.4)
POTASSIUM SERPL-SCNC: 5 MMOL/L (ref 3.6–5)
PROT SERPL-MCNC: 6.5 GM/DL (ref 6.4–8.2)
PROT UR QL STRIP: (no result)
RBC #/AREA URNS HPF: (no result) /HPF
SODIUM SERPL-SCNC: 139 MMOL/L (ref 135–145)
SP GR UR STRIP: >1.03 (ref 1.02–1.02)
WBC # BLD AUTO: 5.4 10^3/UL (ref 4.3–11)
WBC #/AREA URNS HPF: (no result) /HPF

## 2020-12-09 PROCEDURE — 74176 CT ABD & PELVIS W/O CONTRAST: CPT

## 2020-12-09 PROCEDURE — 99284 EMERGENCY DEPT VISIT MOD MDM: CPT

## 2020-12-09 PROCEDURE — 36415 COLL VENOUS BLD VENIPUNCTURE: CPT

## 2020-12-09 PROCEDURE — 81000 URINALYSIS NONAUTO W/SCOPE: CPT

## 2020-12-09 PROCEDURE — 83690 ASSAY OF LIPASE: CPT

## 2020-12-09 PROCEDURE — 85025 COMPLETE CBC W/AUTO DIFF WBC: CPT

## 2020-12-09 PROCEDURE — 80053 COMPREHEN METABOLIC PANEL: CPT

## 2020-12-09 RX ADMIN — PREGABALIN SCH MG: 75 CAPSULE ORAL at 18:47

## 2020-12-09 RX ADMIN — HYDROMORPHONE HYDROCHLORIDE PRN MG: 4 TABLET ORAL at 18:48

## 2020-12-09 RX ADMIN — SODIUM CHLORIDE SCH MLS/HR: 900 INJECTION, SOLUTION INTRAVENOUS at 17:21

## 2020-12-09 RX ADMIN — METHADONE HYDROCHLORIDE SCH MG: 10 TABLET ORAL at 21:52

## 2020-12-09 NOTE — HISTORY & PHYSICAL
ROSITA CAMACHO MED STUDENT 20 [id number]:


History of Present Illness


History of Present Illness


Reason for visit/HPI


CC: Abdominal Pain 





HPI: Kiran presented to the [place] ER today with a chief complaint of 

abdominal pain. He stated that the pain started on . Pt stated that this 

pain is different than the pain that he normally has with his Crohn's disease. 

Pt described the pain as "clawing" and rated his pain at 5/10. He stated that 

the pain has increased in intensity since , but has not increased in 

frequency. He also stated that the pain seems to come and go and that it is over

the whole abdomen. The pain is better with pain medications, he took methadone 

and dilaudid last night because he was in so much pain. The pain is worse when 

he is trying to move around in bed, or when he lays on his back.


Date of Admission


Dec 9, 2020 at 15:43


Date Seen by a Provider:  Dec 9, 2020


Time Seen by a Provider:  17:00


I consulted on this patient on


20


 18:00


Attending Physician


John López MD


Admitting Physician


Simba Hernandez MD


Consult








Allergies and Home Medications


Allergies


Coded Allergies:  


     Penicillins (Verified  Allergy, Severe, EDEMA, 20)


     adhesive tape (Verified  Allergy, Severe, RASH, 20)


     cefazolin (Verified  Allergy, Severe, RASH, 20)


     cephalexin (Verified  Allergy, Severe, RASH, 20)


     morphine (Verified  Allergy, Severe, EDEMA, takes Hydromorphone at home, 

20)


     butabarbital (Verified  Allergy, Unknown, 20)


     butorphanol (Verified  Allergy, Unknown, 20)


     ibuprofen (Verified  Allergy, Unknown, 20)


     piperacillin (Verified  Allergy, Unknown, EDEMA, 20)


     promethazine (Verified  Allergy, Unknown, 20)


     silicone (Verified  Allergy, Unknown, 20)


     zolpidem (Verified  Allergy, Unknown, 20)


     ofloxacin (Verified  Adverse Reaction, Unknown, DIARRHEA, 20)





Home Medications


Acetaminophen 325 Mg Capsule, 650 MG PO Q8H PRN for PAIN-MILD (1-4), (Reported)


Azathioprine 50 Mg Tablet, 50 MG PO DAILY, (Reported)


Cholestyramine (with Sugar) 378 Gm Powder, 4 GM PO BID PRN for DIARRHEA, 

(Reported)


Famotidine 20 Mg Tablet, 20 MG PO Q24H


   Prescribed by: LETI URENA on 20 1142


Hydromorphone HCl 8 Mg Tablet, 8 MG PO Q6H PRN for PAIN-SEVERE (8-10), 

(Reported)


Levothyroxine Sodium 25 Mcg Tablet, 25 MCG PO DAILY, (Reported)


Lisinopril 5 Mg Tablet, 5 MG PO DAILY, (Reported)


Methadone HCl 10 Mg Tablet, 10 MG PO Q6- 8H, (Reported)


Multivitamin 1 Each Tablet, 1 EACH PO DAILY, (Reported)


Peg 400/Hypromellose/Glycerin 15 Ml Drops, 2 DROPS OU PRN PRN for DRY EYES, (R

eported)


Pregabalin 75 Mg Capsule, 75 MG PO TID, (Reported)


Temazepam 15 Mg Capsule, 15-30 MG PO HS PRN for SLEEP, (Reported)


Terbinafine HCl 250 Mg Tablet, 250 MG PO DAILY, (Reported)


Venlafaxine HCl 75 Mg Cap.er.24h, 75 MG PO DAILY, (Reported)


   TAKES 75MG +150MG TO EQUAL 225MG DAILY 


Venlafaxine HCl 150 Mg Cap.er.24h, 150 MG PO DAILY, (Reported)


   TAKES 75MG +150MG TO EQUAL 225MG DAILY 





Past Medical-Social-Family Hx


Patient Social History


Employed/Student:  retired


Alcohol Use:  Denies Use


Recreational Drug Use:  No


Smoking Status:  Never a Smoker


2nd Hand Smoke Exposure:  No


Recent Foreign Travel:  No


Contact w/other who traveled:  No


Recent Hopitalizations:  No


Recent Infectious Disease Expo:  No





Immunizations Up To Date


Date of Pneumonia Vaccine:  Dec 9, 2017


Date of Influenza Vaccine:  Oct 3, 2020





Seasonal Allergies


Seasonal Allergies:  No





Surgeries


Yes (X11 ABDOMIAL, EYE)


Appendectomy, Bowel Surgery, Eye Surgery, Gallbladder





Respiratory


Yes


Pulmonary Embolism


Currently Using CPAP:  No


Currently Using BIPAP:  No





Cardiovascular


Yes (BLOD CLOT, PERICARDITIS)





Neurological


Yes (BRAIN STEM STROKE)


Stroke (Brainstem, 2018)





Genitourinary


No





Gastrointestinal


Yes


Crohns Disease, Obstructive Bowel





Musculoskeletal


No





Endocrine


History of Endocrine Disorders:  Yes


Endocrine Disorders:  Hypothyroidsim





HEENT


History of HEENT Disorders:  No


HEENT Disorders:  Cataract


Loss of Vision:  Denies


Hearing Impairment:  Denies





Cancer


No





Psychosocial


History of Psychiatric Problem:  No





Integumentary


History of Skin or Integumenta:  No





Blood Transfusions


History of Blood Disorders:  No





Family Medical History


Significant Family History:  Asthma





Review of Systems


Constitutional:  No chills, No diaphoresis, No dizziness, No fever


EENTM:  No blurred vision, No vision loss


Respiratory:  No cough, No hemoptysis, No short of breath


Cardiovascular:  No chest pain, No palpitations


Gastrointestinal:  abdominal pain (all four quadrants ), constipation; No 

hematemesis; nausea; No vomiting


Genitourinary:  no symptoms reported


Musculoskeletal:  No joint pain, No joint swelling





Physical Exam


Vital Signs





Vital Signs - First Documented








 20





 10:23 16:24


 


Temp 36.6 


 


Pulse 77 


 


Resp 14 


 


B/P (MAP) 152/59 (90) 


 


Pulse Ox 99 


 


O2 Delivery  Room Air





Capillary Refill : Less Than 3 Seconds


Height, Weight, BMI


Height: '"


Weight: lbs. oz. kg; 27.21 BMI


Method:


General Appearance:  WD/WN, Mild Distress


HEENT:  PERRL/EOMI; No Scleral Icterus (L), No Scleral Icterus (R)


Neck:  Full Range of Motion, Normal Inspection, Supple; No Lymphadenopathy (L), 

No Lymphadenopathy (R); Tender Lateral (L)


Respiratory:  Chest Non Tender, Lungs Clear, Normal Breath Sounds, No Accessory 

Muscle Use, No Respiratory Distress


Cardiovascular:  Regular Rate, Rhythm, No Edema, No Murmur, Normal Peripheral 

Pulses


Gastrointestinal:  Normal Bowel Sounds, Soft, Tenderness


Extremity:  Normal Capillary Refill, Normal Inspection, No Pedal Edema


Neurologic/Psychiatric:  Alert, Oriented x3, Sensory Deficit (No pain and 

temperature on R body and L face )


Skin:  Normal Color, Warm/Dry; No Diaphoresis


Lymphatic:  No Adenopathy





Assessment/Plan


Assessment and Plan


Problems:  


(1) Ileus


Status:  Acute


Assessment & Plan:  - Clear liquid diet, start Miralax. If patient starts 

vomiting, proceed to NPO status. 





(2) Abdominal pain


Status:  Acute


Qualifiers:  


   Qualified Codes:  R10.84 - Generalized abdominal pain


Assessment & Plan:  - Continue home medications with appropriate renal 

dosing adjustments for methadone, pregabalin and dilaudid. 





(3) Dehydration


Status:  Acute


Assessment & Plan:  - IV fluids, NaCl running 125 MLS/hr. Monitor 

electrolyte levels with BMP


 





(4) Crohn disease


Assessment & Plan:  - Monitor for possible Crohn's flare. Continue home 

medications with appropriate renal dosing adjustments as needed. 





(5) Acute kidney injury


Status:  Acute


Assessment & Plan:  - Appropriate renal dosing of all home medications, IV 

fluids 





(6) Anemia


Status:  Chronic


Qualifiers:  


   Qualified Codes:  D64.9 - Anemia, unspecified


Assessment & Plan:  - Continue to monitor hemoglobin levels with CBC. Most 

likely anemia due to chronic disease. Monitor for acute blood loss. 





(7) DVT prophylaxis


Status:  Acute


Assessment & Plan:  - Start Lovenox injections.








Clinical Quality Measures


DVT/VTE Risk/Contraindication:


Risk Factor Score Per Nursin


RFS Level Per Nursing on Admit:  4+=Very High





JOHN LÓPEZ MD 20 2111:


History of Present Illness


History of Present Illness


Reason for visit/HPI


Pt noted he had diarrhea and was taking cholestyramine for the last few days.





Allergies and Home Medications


Allergies


Coded Allergies:  


     Penicillins (Verified  Allergy, Severe, EDEMA, 20)


     adhesive tape (Verified  Allergy, Severe, RASH, 20)


     cefazolin (Verified  Allergy, Severe, RASH, 20)


     cephalexin (Verified  Allergy, Severe, RASH, 20)


     morphine (Verified  Allergy, Severe, EDEMA, takes Hydromorphone at home, 

20)


     butabarbital (Verified  Allergy, Unknown, 20)


     butorphanol (Verified  Allergy, Unknown, 20)


     ibuprofen (Verified  Allergy, Unknown, 20)


     piperacillin (Verified  Allergy, Unknown, EDEMA, 20)


     promethazine (Verified  Allergy, Unknown, 20)


     silicone (Verified  Allergy, Unknown, 20)


     zolpidem (Verified  Allergy, Unknown, 20)


     ofloxacin (Verified  Adverse Reaction, Unknown, DIARRHEA, 20)





Home Medications


Acetaminophen 325 Mg Capsule, 650 MG PO Q8H PRN for PAIN-MILD (1-4), (Reported)


Azathioprine 50 Mg Tablet, 50 MG PO DAILY, (Reported)


Cholestyramine (with Sugar) 378 Gm Powder, 4 GM PO BID PRN for DIARRHEA, 

(Reported)


Famotidine 20 Mg Tablet, 20 MG PO Q24H


   Prescribed by: LETI URENA on 20 1142


Hydromorphone HCl 8 Mg Tablet, 8 MG PO Q6H PRN for PAIN-SEVERE (8-10), 

(Reported)


Levothyroxine Sodium 25 Mcg Tablet, 25 MCG PO DAILY, (Reported)


Lisinopril 5 Mg Tablet, 5 MG PO DAILY, (Reported)


Methadone HCl 10 Mg Tablet, 10 MG PO Q6- 8H, (Reported)


Multivitamin 1 Each Tablet, 1 EACH PO DAILY, (Reported)


Peg 400/Hypromellose/Glycerin 15 Ml Drops, 2 DROPS OU PRN PRN for DRY EYES, 

(Reported)


Pregabalin 75 Mg Capsule, 75 MG PO TID, (Reported)


Temazepam 15 Mg Capsule, 15-30 MG PO HS PRN for SLEEP, (Reported)


Terbinafine HCl 250 Mg Tablet, 250 MG PO DAILY, (Reported)


Venlafaxine HCl 75 Mg Cap.er.24h, 75 MG PO DAILY, (Reported)


   TAKES 75MG +150MG TO EQUAL 225MG DAILY 


Venlafaxine HCl 150 Mg Cap.er.24h, 150 MG PO DAILY, (Reported)


   TAKES 75MG +150MG TO EQUAL 225MG DAILY 





Patient Home Medication List


Home Medication List Reviewed:  Yes





Physical Exam


General Appearance:  WD/WN


Respiratory:  Lungs Clear, Normal Breath Sounds


Cardiovascular:  Regular Rate, Rhythm, No Edema, No Murmur


Gastrointestinal:  Normal Bowel Sounds, Soft, Tenderness


Extremity:  No Pedal Edema


Neurologic/Psychiatric:  Alert, Normal Mood/Affect


Skin:  Normal Color, Warm/Dry





Assessment/Plan


Admission Diagnosis


Admission Status:  Observation





Supervisory-Addendum Brief


Verification & Attestation


Participated in pt care:  history, MDM, physical


Personally performed:  exam, history, MDM


Care discussed with:  Medical Student


Procedures:  n/a


I personally repeated the history and physical exam (see my physical exam 

documentation) and the history is the same as that documented by the medical 

student. I directed the plan of care as documented. He had iron studies done out

patient which showed normal TIBC, iron and ferritin in the summer, elevated 

ferritin in November, suspect anemia of chronic disease, but did also have 

hemoccult positive stool outpatient- possibly chronic related to Crohn's. 

Monitor anemia closely with enoxaparin use. CHARO noted at hospitalization in 

November, reportedly normal kidney function prior, after last hospitalization 

had Cr still at above 2 outpatient a couple of weeks ago. Unclear if he has new 

CKD versus persistent acute kidney injury. If not improving with fluids, will 

need further work-up for CKD. Confirmed home pain medication doses as those 

reported here and those on his outpatient EMR did not match, and he reports 

these are prescribed by Dr. Erickson although his PCP is Dr. Hernandez.











ROSITA CAMACHO MED STUDENT         Dec 9, 2020 18:05


JOHN LÓPEZ MD              Dec 9, 2020 21:11

## 2020-12-09 NOTE — ED ABDOMINAL PAIN
General


Chief Complaint:  Abdominal/GI Problems


Stated Complaint:  GEN WEAKNESS; NAUSEA


Nursing Triage Note:  


Has had generalized abdominal pain since  that is worsening. Is weak and  


nauseous. Last BM 2 days ago. Has hx of kidney problems and was in hospital last




month for dehydration. Has hx of anemia requiring blood transfusions and thinks 


his hemoglobin may be low again. Hx of Crohn's disease. Denies blood in stool, 


fevers, or respiratory symptoms. Took dilauded and methadone at 0200 this 


morning for pain.


Sepsis Screen:  No Definite Risk


Source of Information:  Patient





History of Present Illness


Date Seen by Provider:  Dec 9, 2020


Time Seen by Provider:  10:53


Initial Comments


65-year-old male presenting with complaints of generalized abdominal pain since 

 that has been worsening. He feels weak and nauseated. He states his last 

bowel movement was 2 days ago. He has been having decreased oral intake because 

of the nausea. He denies any actual vomiting. He states that he has had 

decreased urine output. He denies any dark tarry stools or blood in his stool. 

He was concerned that he was losing blood again because he felt weak and had 

similar symptoms when he was admitted  and required a blood 

transfusion. he feels dizzy when standing and changing positions. He states that

the abdominal pain is diffuse and crampy. He did take a dose of Dilaudid and 

methadone around 2 AM to try and help with his symptoms. He states that this 

helps a little bit with this pain but he was still having pain so he came in 

today.





Allergies and Home Medications


Allergies


Coded Allergies:  


     Penicillins (Verified  Allergy, Severe, EDEMA, 20)


     adhesive tape (Verified  Allergy, Severe, RASH, 20)


     cefazolin (Verified  Allergy, Severe, RASH, 20)


     cephalexin (Verified  Allergy, Severe, RASH, 20)


     morphine (Verified  Allergy, Severe, EDEMA, takes Hydromorphone at home, 

20)


     butabarbital (Verified  Allergy, Unknown, 20)


     butorphanol (Verified  Allergy, Unknown, 20)


     ibuprofen (Verified  Allergy, Unknown, 20)


     piperacillin (Verified  Allergy, Unknown, EDEMA, 20)


     promethazine (Verified  Allergy, Unknown, 20)


     silicone (Verified  Allergy, Unknown, 20)


     zolpidem (Verified  Allergy, Unknown, 20)


     ofloxacin (Verified  Adverse Reaction, Unknown, DIARRHEA, 20)





Home Medications


Acetaminophen 325 Mg Capsule, 650 MG PO Q8H PRN for PAIN-MILD (1-4), (Reported)


Azathioprine 50 Mg Tablet, 50 MG PO DAILY, (Reported)


Cholestyramine (with Sugar) 378 Gm Powder, 4 GM PO BID PRN for DIARRHEA, 

(Reported)


Famotidine 20 Mg Tablet, 20 MG PO Q24H


   Prescribed by: LETI URENA on 20 1142


Hydromorphone HCl 8 Mg Tablet, 8 MG PO Q6H PRN for PAIN-SEVERE (8-10), 

(Reported)


Levothyroxine Sodium 25 Mcg Tablet, 25 MCG PO DAILY, (Reported)


Lisinopril 5 Mg Tablet, 5 MG PO DAILY, (Reported)


Methadone HCl 10 Mg Tablet, 10 MG PO Q6- 8H, (Reported)


Multivitamin 1 Each Tablet, 1 EACH PO DAILY, (Reported)


Peg 400/Hypromellose/Glycerin 15 Ml Drops, 2 DROPS OU PRN PRN for DRY EYES, 

(Reported)


Pregabalin 75 Mg Capsule, 75 MG PO TID, (Reported)


Spironolactone 50 Mg Tablet, 50 MG PO DAILY, (Reported)


Temazepam 15 Mg Capsule, 15-30 MG PO HS PRN for SLEEP, (Reported)


Terbinafine HCl 250 Mg Tablet, 250 MG PO DAILY, (Reported)


Venlafaxine HCl 75 Mg Cap.er.24h, 75 MG PO DAILY, (Reported)


   TAKES 75MG +150MG TO EQUAL 225MG DAILY 


Venlafaxine HCl 150 Mg Cap.er.24h, 150 MG PO DAILY, (Reported)


   TAKES 75MG +150MG TO EQUAL 225MG DAILY 





Patient Home Medication List


Home Medication List Reviewed:  Yes





Review of Systems


Review of Systems


Constitutional:  see HPI; No chills; dizziness; No fever; malaise


EENTM:  No Symptoms Reported


Respiratory:  Denies Cough, Denies Shortness of Air


Cardiovascular:  Denies Chest Pain


Gastrointestinal:  See HPI


Genitourinary:  See HPI; Denies Burning


Musculoskeletal:  no symptoms reported


Skin:  No rash


Psychiatric/Neurological:  Denies Headache; Weakness (general)


Endocrine:  Intolerance to Cold


Hematologic/Lymphatic:  Easy Bleeding, Easy Bruising





Past Medical-Social-Family Hx


Past Med/Social Hx:  Reviewed Nursing Past Med/Soc Hx


Patient Social History


Alcohol Use:  Denies Use


Recreational Drug Use:  No


2nd Hand Smoke Exposure:  No


Recent Foreign Travel:  No


Contact w/Someone Who Travel:  No


Recent Infectious Disease Expo:  No


Recent Hopitalizations:  No


Physical Abuse:  No


Sexual Abuse:  No


Mistreated:  No


Fear:  No





Immunizations Up To Date


Date of Influenza Vaccine:  Oct 3, 2019





Seasonal Allergies


Seasonal Allergies:  No





Past Medical History


Surgeries:  Yes (X11 ABDOMIAL, EYE)


Appendectomy, Bowel Surgery, Eye Surgery, Gallbladder


Respiratory:  No


Currently Using CPAP:  No


Currently Using BIPAP:  No


Cardiac:  Yes (BLOD CLOT, PERICARDITIS)


Neurological:  Yes (BRAIN STEM STROKE)


Stroke


Genitourinary:  No


Gastrointestinal:  Yes


Crohns Disease, Obstructive Bowel


Musculoskeletal:  No


Endocrine:  No


Hypothyroidsim


HEENT:  No


Cataract


Loss of Vision:  Denies


Hearing Impairment:  Denies


Cancer:  No


Psychosocial:  No


Integumentary:  No


Blood Disorders:  No





Family Medical History


Asthma





Physical Exam


Vital Signs





Vital Signs - First Documented








 20





 10:23


 


Temp 36.6


 


Pulse 77


 


Resp 14


 


B/P (MAP) 152/59 (90)


 


Pulse Ox 99





Capillary Refill : Less Than 3 Seconds


Height/Weight/BMI


Height: '"


Weight: lbs. oz. kg; 27.00 BMI


Method:


General Appearance:  mild distress


HEENT:  PERRL/EOMI, pharynx normal


Neck:  supple, normal inspection


Respiratory:  chest non-tender, lungs clear, normal breath sounds, no 

respiratory distress, no accessory muscle use


Cardiovascular:  normal peripheral pulses, regular rate, rhythm


Gastrointestinal:  soft, no pulsatile mass, abnormal bowel sounds (hypoactive); 

No guarding, No rebound; tenderness (diffuse mild)


Rectal:  deferred


Extremities:  normal range of motion, normal capillary refill


Neurologic/Psychiatric:  CNs II-XII nml as tested, alert, oriented x 3


Skin:  normal color, warm/dry





Images











1 - diffuse abdominal tenderness. No rebound or guarding








Progress/Results/Core Measures


Results/Orders


Lab Results





Laboratory Tests








Test


 20


11:03 20


11:13 Range/Units


 


 


White Blood Count


 5.4 


 


 4.3-11.0


10^3/uL


 


Red Blood Count


 2.79 L


 


 4.35-5.85


10^6/uL


 


Hemoglobin 8.6 L  13.3-17.7  G/DL


 


Hematocrit 26 L  40-54  %


 


Mean Corpuscular Volume 93   80-99  FL


 


Mean Corpuscular Hemoglobin 31   25-34  PG


 


Mean Corpuscular Hemoglobin


Concent 33 


 


 32-36  G/DL





 


Red Cell Distribution Width 13.2   10.0-14.5  %


 


Platelet Count


 165 


 


 130-400


10^3/uL


 


Mean Platelet Volume 10.2   7.4-10.4  FL


 


Immature Granulocyte % (Auto) 0    %


 


Neutrophils (%) (Auto) 77 H  42-75  %


 


Lymphocytes (%) (Auto) 16   12-44  %


 


Monocytes (%) (Auto) 6   0-12  %


 


Eosinophils (%) (Auto) 1   0-10  %


 


Basophils (%) (Auto) 0   0-10  %


 


Neutrophils # (Auto) 4.1   1.8-7.8  X 10^3


 


Lymphocytes # (Auto) 0.9 L  1.0-4.0  X 10^3


 


Monocytes # (Auto) 0.3   0.0-1.0  X 10^3


 


Eosinophils # (Auto)


 0.0 


 


 0.0-0.3


10^3/uL


 


Basophils # (Auto)


 0.0 


 


 0.0-0.1


10^3/uL


 


Immature Granulocyte # (Auto)


 0.0 


 


 0.0-0.1


10^3/uL


 


Sodium Level 139   135-145  MMOL/L


 


Potassium Level 5.0   3.6-5.0  MMOL/L


 


Chloride Level 105     MMOL/L


 


Carbon Dioxide Level 21   21-32  MMOL/L


 


Anion Gap 13   5-14  MMOL/L


 


Blood Urea Nitrogen 32 H  7-18  MG/DL


 


Creatinine


 2.06 H


 


 0.60-1.30


MG/DL


 


Estimat Glomerular Filtration


Rate 33 


 


  





 


BUN/Creatinine Ratio 16    


 


Glucose Level 102     MG/DL


 


Calcium Level 8.7   8.5-10.1  MG/DL


 


Corrected Calcium 8.5   8.5-10.1  MG/DL


 


Total Bilirubin 0.5   0.1-1.0  MG/DL


 


Aspartate Amino Transf


(AST/SGOT) 9 


 


 5-34  U/L





 


Alanine Aminotransferase


(ALT/SGPT) 6 


 


 0-55  U/L





 


Alkaline Phosphatase 106     U/L


 


Total Protein 6.5   6.4-8.2  GM/DL


 


Albumin 4.2   3.2-4.5  GM/DL


 


Lipase 34   8-78  U/L


 


Urine Color  YELLOW   


 


Urine Clarity  CLEAR   


 


Urine pH  5.5  5-9  


 


Urine Specific Gravity  >1.030  1.016-1.022  


 


Urine Protein  1+ H NEGATIVE  


 


Urine Glucose (UA)  NEGATIVE  NEGATIVE  


 


Urine Ketones  NEGATIVE  NEGATIVE  


 


Urine Nitrite  NEGATIVE  NEGATIVE  


 


Urine Bilirubin  NEGATIVE  NEGATIVE  


 


Urine Urobilinogen  0.2  < = 1.0  MG/DL


 


Urine Leukocyte Esterase  NEGATIVE  NEGATIVE  


 


Urine RBC (Auto)  1+ H NEGATIVE  


 


Urine RBC  2-5 H  /HPF


 


Urine WBC  NONE   /HPF


 


Urine Crystals  NONE   /LPF


 


Urine Bacteria  NONE   /HPF


 


Urine Casts  NONE   /LPF


 


Urine Mucus  NEGATIVE   /LPF


 


Urine Culture Indicated  NO   








My Orders





Orders - RONY GUPTA MD


Comprehensive Metabolic Panel (20 10:39)


Lipase (20 10:39)


Ua Culture If Indicated (20 10:39)


Ed Iv/Invasive Line Start (20 10:39)


Cbc With Automated Diff (20 10:39)


Ns Iv 1000 Ml (Sodium Chloride 0.9%) (20 11:30)


Hydromorphone Injection (Dilaudid Inject (20 11:30)


Ondansetron Injection (Zofran Injectio (20 11:30)


Ct Abdomen/Pelvis Wo (20 11:27)





Medications Given in ED





Current Medications








 Medications  Dose


 Ordered  Sig/Khalif


 Route  Start Time


 Stop Time Status Last Admin


Dose Admin


 


 Hydromorphone HCl  0.5 mg  ONCE  ONCE


 IVP  20 11:30


 20 11:31 DC 20 12:05


0.5 MG


 


 Ondansetron HCl  4 mg  ONCE  ONCE


 IVP  20 11:30


 20 11:31 DC 20 12:06


4 MG








Vital Signs/I&O











 20





 10:23


 


Temp 36.6


 


Pulse 77


 


Resp 14


 


B/P (MAP) 152/59 (90)


 


Pulse Ox 99














Blood Pressure Mean:                    90











Progress


Progress Note #1:  


   Progress Note


check basic labs and CT scan. Since his access is through a port will not be abl

e to do an IV contrast study, plus his last GFR and Cr were elevated anyway. 

Give IVF for hydration, Dilaudid 0.5 mg IV, Zofran 4 mg IV.


Progress Note #2:  


   Progress Note


labs appear stable from his last discharge. His hemoglobin is 8.6 and creatinine

2.0. His urine does appear concentrated with an elevated specific gravity 

greater than 1.030. The CT scan came back showing constipation with evidence of 

some mild ileus. Discussed with the patient and since he is not tolerating oral 

very well Will discuss with the hospitalist on-call for [facility] about admission to 

hydrate and make sure he is tolerating oral as well as having his bowels move 

before discharge to home. Concerned that if he went home at this point that he 

could not tolerate oral and ended up with a bowel distraction or worsening 

dehydration and worsening renal function.


I placed a call to Dr. Sandhu at [phone] and left a message for her to call me back.


Progress Note #3:  


   Time:  13:18


   Progress Note


1318 d/w Dr. Sandhu and she accepted pt for admit for hydration and to ensure he 

is having bowel movements and tolerating po





Diagnostic Imaging





   Diagonstic Imaging:  CT


   Plain Films/CT/US/NM/MRI:  abdomen, pelvis


   Comments


                 [facility] [place], [place]





NAME:   DAWN YIN


[facility] REC#:   [id number]


ACCOUNT#:   [id number]


PT STATUS:   REG ER


:   1955


PHYSICIAN:   RONY GUPTA MD


ADMIT DATE:   20/ER FS


                                   ***Draft***


Date of Exam:20





CT ABDOMEN/PELVIS WO








PROCEDURE: CT abdomen and pelvis without contrast.





TECHNIQUE: Multiple contiguous axial images were obtained through


the abdomen and pelvis without the use of intravenous contrast.


Auto Exposure Controls were utilized during the CT exam to meet


ALARA standards for radiation dose reduction. 





INDICATION: Diffuse abdominal pain, nausea, history of Crohn's


disease and multiple surgeries.





COMPARISON: 2020.





FINDINGS:





There is chronic scarring/atelectasis in the lateral left lung


base. The heart is normal in size.





The liver demonstrates no focal lesions. The spleen appears


normal. The pancreas is normal. The adrenal glands appear normal.


The kidneys demonstrate no calculi or hydronephrosis.





Cholecystectomy clips are noted. Anastomotic sutures are seen in


the bowel. Marked stool is seen in the colon and rectum beginning


at the descending colon. There are a few prominent loops of small


bowel in the left abdomen. There is a mild amount of mesenteric


edema. No fluid collections are seen on this noncontrast exam.


There is mild atherosclerosis. There is a small fat-containing


right inguinal hernia. There are degenerative changes in the


spine. No free air is seen.





IMPRESSION:


1. Marked stool in the colon, please correlate with history of


constipation.


2. Mildly prominent loops of small bowel in the left abdomen, may


represent an ileus. No definite transition point or obstruction


is seen. Mild mesenteric edema may be reactive to inflammatory


change given the patient's history.





  Dictated on workstation # [id number]








Dict:   20 1157


Trans:   20 1206


[id number] [id number]





Interpreted by:     ROGERS CHAVARRIA MD


Electronically signed by:





Departure


Communication (Admissions)


Time/Spoke to Admitting Phy:  13:18


discussed with Dr. Sandhu and will admit the patient for hydration and make sure 

that he is tolerating oral as well as having his bowels move. Will order a 

single dose of MiraLAX on arrival to the hospital to try and help with starting 

his bowels moving.





Impression





   Primary Impression:  


   Ileus


   Additional Impressions:  


   Dehydration


   Chronic renal failure


   Qualified Codes:  N18.3 - Chronic kidney disease, stage 3 (moderate)


Disposition:  30 STILL A PATIENT


Condition:  Stable





Admissions


Decision to Admit Reason:  Admit from ER (General)


Decision to Admit/Date:  Dec 9, 2020


Time/Decision to Admit Time:  13:18





Departure-Patient Inst.


Referrals:  


CONCHA BEVERLY MD (PCP/Family)


Primary Care Physician











RONY GUPTA MD                Dec 9, 2020 10:53

## 2020-12-10 VITALS — DIASTOLIC BLOOD PRESSURE: 60 MMHG | SYSTOLIC BLOOD PRESSURE: 128 MMHG

## 2020-12-10 VITALS — DIASTOLIC BLOOD PRESSURE: 73 MMHG | SYSTOLIC BLOOD PRESSURE: 133 MMHG

## 2020-12-10 VITALS — SYSTOLIC BLOOD PRESSURE: 121 MMHG | DIASTOLIC BLOOD PRESSURE: 74 MMHG

## 2020-12-10 VITALS — SYSTOLIC BLOOD PRESSURE: 146 MMHG | DIASTOLIC BLOOD PRESSURE: 76 MMHG

## 2020-12-10 LAB
ALBUMIN SERPL-MCNC: 3.7 GM/DL (ref 3.2–4.5)
ALP SERPL-CCNC: 80 U/L (ref 40–136)
ALT SERPL-CCNC: 8 U/L (ref 0–55)
BASOPHILS # BLD AUTO: 0 10^3/UL (ref 0–0.1)
BASOPHILS NFR BLD AUTO: 1 % (ref 0–10)
BILIRUB SERPL-MCNC: 0.7 MG/DL (ref 0.1–1)
BUN/CREAT SERPL: 12
CALCIUM SERPL-MCNC: 7.9 MG/DL (ref 8.5–10.1)
CHLORIDE SERPL-SCNC: 112 MMOL/L (ref 98–107)
CO2 SERPL-SCNC: 22 MMOL/L (ref 21–32)
CREAT SERPL-MCNC: 2.02 MG/DL (ref 0.6–1.3)
EOSINOPHIL # BLD AUTO: 0.1 10^3/UL (ref 0–0.3)
EOSINOPHIL NFR BLD AUTO: 3 % (ref 0–10)
GFR SERPLBLD BASED ON 1.73 SQ M-ARVRAT: 34 ML/MIN
GLUCOSE SERPL-MCNC: 92 MG/DL (ref 70–105)
HCT VFR BLD CALC: 25 % (ref 40–54)
HGB BLD-MCNC: 7.8 G/DL (ref 13.3–17.7)
LYMPHOCYTES # BLD AUTO: 1.1 10^3/UL (ref 1–4)
LYMPHOCYTES NFR BLD AUTO: 25 % (ref 12–44)
MANUAL DIFFERENTIAL PERFORMED BLD QL: NO
MCH RBC QN AUTO: 31 PG (ref 25–34)
MCHC RBC AUTO-ENTMCNC: 32 G/DL (ref 32–36)
MCV RBC AUTO: 98 FL (ref 80–99)
MONOCYTES # BLD AUTO: 0.3 10^3/UL (ref 0–1)
MONOCYTES NFR BLD AUTO: 8 % (ref 0–12)
NEUTROPHILS # BLD AUTO: 2.6 10^3/UL (ref 1.8–7.8)
NEUTROPHILS NFR BLD AUTO: 63 % (ref 42–75)
PLATELET # BLD: 142 10^3/UL (ref 130–400)
PMV BLD AUTO: 10.1 FL (ref 9–12.2)
POTASSIUM SERPL-SCNC: 4.6 MMOL/L (ref 3.6–5)
PROT SERPL-MCNC: 5.5 GM/DL (ref 6.4–8.2)
SODIUM SERPL-SCNC: 143 MMOL/L (ref 135–145)
WBC # BLD AUTO: 4.2 10^3/UL (ref 4.3–11)

## 2020-12-10 RX ADMIN — SODIUM CHLORIDE SCH MLS/HR: 900 INJECTION, SOLUTION INTRAVENOUS at 08:02

## 2020-12-10 RX ADMIN — SODIUM CHLORIDE SCH MLS/HR: 900 INJECTION, SOLUTION INTRAVENOUS at 00:14

## 2020-12-10 RX ADMIN — METHADONE HYDROCHLORIDE SCH MG: 10 TABLET ORAL at 12:46

## 2020-12-10 RX ADMIN — HYDROMORPHONE HYDROCHLORIDE PRN MG: 4 TABLET ORAL at 05:56

## 2020-12-10 RX ADMIN — METHADONE HYDROCHLORIDE SCH MG: 10 TABLET ORAL at 08:03

## 2020-12-10 RX ADMIN — PREGABALIN SCH MG: 75 CAPSULE ORAL at 08:03

## 2020-12-10 NOTE — DISCHARGE SUMMARY
ROSITA CAMACHO MED STUDENT 12/10/20 1309:


Diagnosis/Chief Complaint


Date of Admission


Dec 9, 2020 at 15:43


Date of Discharge





Discharge Date:  Dec 10, 2020


Admission Diagnosis


Admission Diagnosis


Abdominal Pain





Discharge Diagnosis


Abdominal Pain





Reason Hospital Visit


CC: Abdominal Pain 





HPI: Kiran presented to the [place] ER today with a chief complaint of 

abdominal pain. He stated that the pain started on . Pt stated that this 

pain is different than the pain that he normally has with his Crohn's disease. 

Pt described the pain as "clawing" and rated his pain at 5/10. He stated that 

the pain has increased in intensity since , but has not increased in 

frequency. He also stated that the pain seems to come and go and that it is over

the whole abdomen. The pain is better with pain medications, he took methadone 

and dilaudid last night because he was in so much pain. The pain is worse when 

he is trying to move around in bed, or when he lays on his back.





Discharge Summary


Hospital Course


Hospital Course


Kiran presented to the [place] ED yesterday (20) with a chief complaint

of abdominal pain. He was admitted inpatient to [facility]. He was 

evaluated and started on Miralax and IV fluids, in addition to his current home 

medications. During the night, he had multiple bowel movements and by the 

morning of , his acute abdominal pain had resolved. Kiran stated 

that the only pain he had felt like the normal chronic pain that he has with his

Crohn's disease. He is advised to see his primary care provider regarding follow

up and management of his Acute Kidney Injury.


Labs


Laboratory Tests


20 11:03: 


Red Blood Count 2.79L, Hemoglobin 8.6L, Hematocrit 26L, Neutrophils (%) (Auto) 

77H, Lymphocytes # (Auto) 0.9L, Blood Urea Nitrogen 32H, Creatinine 2.06H


20 11:13: 


Urine Protein 1+H, Urine RBC (Auto) 1+H, Urine RBC 2-5H


12/10/20 06:10: 


Red Blood Count 2.52L, Hemoglobin 7.8L, Hematocrit 25L, Blood Urea Nitrogen 25H,

Creatinine 2.02H, White Blood Count 4.2L, Chloride Level 112H, Calcium Level 

7.9L, Corrected Calcium 8.1L, Total Protein 5.5L





Procedures


None.





Discharge Physical Examination


Allergies:  


Coded Allergies:  


     Penicillins (Verified  Allergy, Severe, EDEMA, 20)


     adhesive tape (Verified  Allergy, Severe, RASH, 20)


     cefazolin (Verified  Allergy, Severe, RASH, 20)


     cephalexin (Verified  Allergy, Severe, RASH, 20)


     morphine (Verified  Allergy, Severe, EDEMA, takes Hydromorphone at home, 

20)


     butabarbital (Verified  Allergy, Unknown, 20)


     butorphanol (Verified  Allergy, Unknown, 20)


     ibuprofen (Verified  Allergy, Unknown, 20)


     piperacillin (Verified  Allergy, Unknown, EDEMA, 20)


     promethazine (Verified  Allergy, Unknown, 20)


     silicone (Verified  Allergy, Unknown, 20)


     zolpidem (Verified  Allergy, Unknown, 20)


     ofloxacin (Verified  Adverse Reaction, Unknown, DIARRHEA, 20)


Vitals & I&Os





Vital Signs








  Date Time  Temp Pulse Resp B/P (MAP) Pulse Ox O2 Delivery O2 Flow Rate FiO2


 


12/10/20 11:35 35.9 69 12 128/60 (82) 94 Room Air  








General Appearance:  Oriented X3, Cooperative


HEENT:  PERRLA, EOMI


Respiratory:  Clear to Auscultation, Normal Air Movement


Cardiovascular:  Regular Rate, No Murmurs


Abdominal:  Normal Bowel Sounds, Soft, Other (Tenderness with palpation )


Extremities:  No Cyanosis, No Edema, Normal Pulses





Discharge


Home Medications


Reviewed and agree with Discharge Medication list on patient's Discharge 

Instruction sheet


Instructions to Patient/Family


Please see electronic discharge instructions given to patient.





Clinical Quality Measures


Admission Status


Admission Dx


Abdominal Pain


Admission Status:  Observation





DVT/VTE Risk/Contraindication:


VTE Addressed:  Yes


VTE Present on Admission:  No


Risk Factor Score Per Nursin


RFS Level Per Nursing on Admit:  4+=Very High





JOHN LÓPEZ MD 20 0856:


Discharge Summary


Discharge Physical Examination


Allergies:  


Coded Allergies:  


     Penicillins (Verified  Allergy, Severe, EDEMA, 20)


     adhesive tape (Verified  Allergy, Severe, RASH, 20)


     cefazolin (Verified  Allergy, Severe, RASH, 20)


     cephalexin (Verified  Allergy, Severe, RASH, 20)


     morphine (Verified  Allergy, Severe, EDEMA, takes Hydromorphone at home, 

20)


     butabarbital (Verified  Allergy, Unknown, 20)


     butorphanol (Verified  Allergy, Unknown, 20)


     ibuprofen (Verified  Allergy, Unknown, 20)


     piperacillin (Verified  Allergy, Unknown, EDEMA, 20)


     promethazine (Verified  Allergy, Unknown, 20)


     silicone (Verified  Allergy, Unknown, 20)


     zolpidem (Verified  Allergy, Unknown, 20)


     ofloxacin (Verified  Adverse Reaction, Unknown, DIARRHEA, 20)





Supervisory-Addendum Brief


Verification & Attestation


Participated in pt care:  history, MDM, physical


Personally performed:  exam, history, MDM


Care discussed with:  Medical Student


Procedures:  n/a


I did my own history and exam which match those documented by the medical 

student. Abnormal creatinine is still persistent since last admit, needs further

work-up outpatient.











ROSITA CAMACHO MED STUDENT        Dec 10, 2020 13:09


JOHN LÓPEZ MD             Dec 13, 2020 08:56

## 2020-12-10 NOTE — NUR
RD ASSESSMENT 



PMHx: PE; stroke; Crohn's disease; obstructive bowel; hypothyroidism; 



PT INTERACTION: Pt was awake and pleasant during nutrition assessment. Pt states current 
appetite is not good, and has been this way for about 3-4d. Note avg PO intake 38% x2meal, 
per chart review. Pt states following a low-fiber diet at home, and has some issues with 
chewing/swallowing food. Pt states some recent issues with nausea, constipation, and 
diarrhea. Note last BM was 12/10, and pt not currently on bowel regimen per chart review. Pt 
states recent wt loss, but unsure of amount/timeframe. Note recent 13# wt loss x6mon, per 
chart review. 



ABNORMAL NUTRITION-RELATED LAB VALUES

LOW:  Ca 7.9; Pro 5.5; 

HIGH: Cl 112; BUN 25; cr 2.02; 



Est. kcal needs: 9066-3492 kcal | 20-25 kcal/kg  

Est. Pro needs:  77-96 g Pro | 0.8-1.0 g Pro/kg 



PES STATEMENT: Inadequate oral intake (NI-2.1) related to loss of appetite, nausea, 
constipation, and diarrhea, as evidenced by pt interview, and avg PO intake 38% x2meal. 



INTERVENTION:  

Continue with current diet order of Clear Liquid diet. Pt may benefit from diet advancement, 
when medically able and as tolerated. 

Add Ensure Clear to meals TID, for increased kcal intake. Provides 250 kcal and 8 g Pro per 
serving. 

Will continue to follow and reassess as pt needs, intake, and status change. 





BEATRIZ DIAZ, MS RD  

[phone]

## 2020-12-10 NOTE — PROGRESS NOTE
Subjective


Subjective


Date Seen by Provider:  Dec 10, 2020


Time Seen by Provider:  07:45


Kiran states that his pain is doing better today, but that he is much more 

nauseous. He stated that his current pain is the pain he normally has with the 

Crohn's disease. He is not having any of the clawing pain that brought him to 

the ER yesterday. He stated that his gut feels like it is being spastic. He had 

three loose bowel movements last night. Denies any bleeding with those bowel 

movements. Denies fevers, chills, vomiting, hematochezia, melena and 

constipation.





Review of Systems


General:  No Chills; Fatigue


HEENT:  No Visual Changes


Pulmonary:  No Dyspnea, No Cough


Cardiovascular:  No: Chest Pain, Palpitations


Gastrointestinal:  Nausea, Abdominal Pain (Generalized), Diarrhea; No: Vomiting,

Constipation, Melena, Hematochezia


Genitourinary:  No Frequency, No Incontinence





Objective


Exam


Vital Signs





Vital Signs - First Documented








 20





 10:23 16:24


 


Temp 36.6 


 


Pulse 77 


 


Resp 14 


 


B/P (MAP) 152/59 (90) 


 


Pulse Ox 99 


 


O2 Delivery  Room Air





Capillary Refill : Less Than 3 Seconds


General Appearance:  No Apparent Distress, WD/WN


HEENT:  PERRL/EOMI; No Scleral Icterus (L), No Scleral Icterus (R)


Neck:  Normal Inspection, Non Tender, Supple; No Lymphadenopathy (L), No 

Lymphadenopathy (R)


Respiratory:  Chest Non Tender, Lungs Clear, Normal Breath Sounds, No Accessory 

Muscle Use


Cardiovascular:  Regular Rate, Rhythm, No Edema, No Murmur, Normal Peripheral 

Pulses


Gastrointestinal:  Normal Bowel Sounds, Soft, Tenderness (Generalized )


Extremity:  Normal Capillary Refill, No Pedal Edema


Neurologic/Psychiatric:  Alert, Oriented x3, Normal Mood/Affect


Skin:  Normal Color, Warm/Dry


Lymphatic:  No Adenopathy





Results


Lab


Laboratory Tests


20 11:03: 


White Blood Count 5.4, Red Blood Count 2.79L, Hemoglobin 8.6L, Hematocrit 26L, 

Mean Corpuscular Volume 93, Mean Corpuscular Hemoglobin 31, Mean Corpuscular 

Hemoglobin Concent 33, Red Cell Distribution Width 13.2, Platelet Count 165, 

Mean Platelet Volume 10.2, Immature Granulocyte % (Auto) 0, Neutrophils (%) 

(Auto) 77H, Lymphocytes (%) (Auto) 16, Monocytes (%) (Auto) 6, Eosinophils (%) 

(Auto) 1, Basophils (%) (Auto) 0, Neutrophils # (Auto) 4.1, Lymphocytes # (Auto)

0.9L, Monocytes # (Auto) 0.3, Eosinophils # (Auto) 0.0, Basophils # (Auto) 0.0, 

Immature Granulocyte # (Auto) 0.0, Sodium Level 139, Potassium Level 5.0, 

Chloride Level 105, Carbon Dioxide Level 21, Anion Gap 13, Blood Urea Nitrogen 

32H, Creatinine 2.06H, Estimat Glomerular Filtration Rate 33, BUN/Creatinine 

Ratio 16, Glucose Level 102, Calcium Level 8.7, Corrected Calcium 8.5, Total 

Bilirubin 0.5, Aspartate Amino Transf (AST/SGOT) 9, Alanine Aminotransferase 

(ALT/SGPT) 6, Alkaline Phosphatase 106, Total Protein 6.5, Albumin 4.2, Lipase 

34


20 11:13: 


Urine Color YELLOW, Urine Clarity CLEAR, Urine pH 5.5, Urine Specific Gravity >

1.030, Urine Protein 1+H, Urine Glucose (UA) NEGATIVE, Urine Ketones NEGATIVE, 

Urine Nitrite NEGATIVE, Urine Bilirubin NEGATIVE, Urine Urobilinogen 0.2, Urine 

Leukocyte Esterase NEGATIVE, Urine RBC (Auto) 1+H, Urine RBC 2-5H, Urine WBC 

NONE, Urine Crystals NONE, Urine Bacteria NONE, Urine Casts NONE, Urine Mucus 

NEGATIVE, Urine Culture Indicated NO


12/10/20 06:10: 


White Blood Count 4.2L, Red Blood Count 2.52L, Hemoglobin 7.8L, Hematocrit 25L, 

Mean Corpuscular Volume 98, Mean Corpuscular Hemoglobin 31, Mean Corpuscular 

Hemoglobin Concent 32, Red Cell Distribution Width 13.2, Platelet Count 142, 

Mean Platelet Volume 10.1, Immature Granulocyte % (Auto) 0, Neutrophils (%) 

(Auto) 63, Lymphocytes (%) (Auto) 25, Monocytes (%) (Auto) 8, Eosinophils (%) 

(Auto) 3, Basophils (%) (Auto) 1, Neutrophils # (Auto) 2.6, Lymphocytes # (Auto)

1.1, Monocytes # (Auto) 0.3, Eosinophils # (Auto) 0.1, Basophils # (Auto) 0.0, 

Immature Granulocyte # (Auto) 0.0, Sodium Level 143, Potassium Level 4.6, Chlo

ride Level 112H, Carbon Dioxide Level 22, Anion Gap 9, Blood Urea Nitrogen 25H, 

Creatinine 2.02H, Estimat Glomerular Filtration Rate 34, BUN/Creatinine Ratio 

12, Glucose Level 92, Calcium Level 7.9L, Corrected Calcium 8.1L, Total 

Bilirubin 0.7, Aspartate Amino Transf (AST/SGOT) 7, Alanine Aminotransferase 

(ALT/SGPT) 8, Alkaline Phosphatase 80, Total Protein 5.5L, Albumin 3.7





Assessment/Plan


Assessment/Plan


Problems:  


(1) Ileus


Assessment & Plan:  - Clear liquid diet, start Miralax. If patient starts 

vomiting, proceed to NPO status. 





(2) Abdominal pain


Qualifiers:  


   Qualified Codes:  R10.84 - Generalized abdominal pain


Assessment & Plan:  - Continue home medications with appropriate renal 

dosing adjustments for methadone, pregabalin and dilaudid. 





(3) Dehydration


Assessment & Plan:  - IV fluids, NaCl running 125 MLS/hr. Monitor electrolyt

e levels with BMP


 





(4) Crohn disease


Assessment & Plan:  - Monitor for possible Crohn's flare. Continue home 

medications with appropriate renal dosing adjustments as needed. 





(5) Acute kidney injury


Assessment & Plan:  - Appropriate renal dosing of all home medications, IV 

fluids 





(6) Anemia


Qualifiers:  


   Qualified Codes:  D64.9 - Anemia, unspecified


Assessment & Plan:  - Continue to monitor hemoglobin levels with CBC. Most 

likely anemia due to chronic disease. Monitor for acute blood loss. 





(7) DVT prophylaxis


Assessment & Plan:  - Start Lovenox injections.








Clinical Quality Measures


DVT/VTE Risk/Contraindication:


Risk Factor Score Per Nursin


RFS Level Per Nursing on Admit:  4+=Very High











FIX,ROSITA MED STUDENT        Dec 10, 2020 09:02

## 2021-03-10 ENCOUNTER — HOSPITAL ENCOUNTER (EMERGENCY)
Dept: HOSPITAL 75 - ER FS | Age: 66
Discharge: ANOTHER HOSPITAL | End: 2021-03-10
Payer: MEDICARE

## 2021-03-10 VITALS — WEIGHT: 204.81 LBS | BODY MASS INDEX: 26.28 KG/M2 | HEIGHT: 73.98 IN

## 2021-03-10 VITALS — SYSTOLIC BLOOD PRESSURE: 129 MMHG | DIASTOLIC BLOOD PRESSURE: 78 MMHG

## 2021-03-10 DIAGNOSIS — N18.9: ICD-10-CM

## 2021-03-10 DIAGNOSIS — Z88.0: ICD-10-CM

## 2021-03-10 DIAGNOSIS — Z86.73: ICD-10-CM

## 2021-03-10 DIAGNOSIS — Z88.5: ICD-10-CM

## 2021-03-10 DIAGNOSIS — N17.9: Primary | ICD-10-CM

## 2021-03-10 DIAGNOSIS — Z88.1: ICD-10-CM

## 2021-03-10 DIAGNOSIS — Z79.890: ICD-10-CM

## 2021-03-10 DIAGNOSIS — E03.9: ICD-10-CM

## 2021-03-10 DIAGNOSIS — Z88.6: ICD-10-CM

## 2021-03-10 DIAGNOSIS — Z88.8: ICD-10-CM

## 2021-03-10 DIAGNOSIS — K50.90: ICD-10-CM

## 2021-03-10 LAB
ALBUMIN SERPL-MCNC: 4.3 GM/DL (ref 3.2–4.5)
ALP SERPL-CCNC: 118 U/L (ref 40–136)
ALT SERPL-CCNC: 5 U/L (ref 0–55)
APTT PPP: YELLOW S
BACTERIA #/AREA URNS HPF: (no result) /HPF
BASOPHILS # BLD AUTO: 0 10^3/UL (ref 0–0.1)
BASOPHILS NFR BLD AUTO: 1 % (ref 0–10)
BILIRUB SERPL-MCNC: 0.3 MG/DL (ref 0.1–1)
BILIRUB UR QL STRIP: NEGATIVE
BUN/CREAT SERPL: 9
CALCIUM SERPL-MCNC: 8.9 MG/DL (ref 8.5–10.1)
CHLORIDE SERPL-SCNC: 107 MMOL/L (ref 98–107)
CO2 SERPL-SCNC: 9 MMOL/L (ref 21–32)
CREAT SERPL-MCNC: 9.64 MG/DL (ref 0.6–1.3)
EOSINOPHIL # BLD AUTO: 0.2 10^3/UL (ref 0–0.3)
EOSINOPHIL NFR BLD AUTO: 3 % (ref 0–10)
FIBRINOGEN PPP-MCNC: (no result) MG/DL
GFR SERPLBLD BASED ON 1.73 SQ M-ARVRAT: 6 ML/MIN
GLUCOSE SERPL-MCNC: 94 MG/DL (ref 70–105)
GLUCOSE UR STRIP-MCNC: NEGATIVE MG/DL
GRAN CASTS #/AREA URNS LPF: (no result) /LPF
HCT VFR BLD CALC: 25 % (ref 40–54)
HGB BLD-MCNC: 7.9 G/DL (ref 13.3–17.7)
KETONES UR QL STRIP: NEGATIVE
LEUKOCYTE ESTERASE UR QL STRIP: NEGATIVE
LYMPHOCYTES # BLD AUTO: 1.4 X 10^3 (ref 1–4)
LYMPHOCYTES NFR BLD AUTO: 16 % (ref 12–44)
MAGNESIUM SERPL-MCNC: 1.5 MG/DL (ref 1.6–2.4)
MANUAL DIFFERENTIAL PERFORMED BLD QL: NO
MCH RBC QN AUTO: 32 PG (ref 25–34)
MCHC RBC AUTO-ENTMCNC: 32 G/DL (ref 32–36)
MCV RBC AUTO: 98 FL (ref 80–99)
MONOCYTES # BLD AUTO: 1 X 10^3 (ref 0–1)
MONOCYTES NFR BLD AUTO: 11 % (ref 0–12)
NEUTROPHILS # BLD AUTO: 6 X 10^3 (ref 1.8–7.8)
NEUTROPHILS NFR BLD AUTO: 69 % (ref 42–75)
NITRITE UR QL STRIP: NEGATIVE
PH UR STRIP: 5.5 [PH] (ref 5–9)
PLATELET # BLD: 261 10^3/UL (ref 130–400)
PMV BLD AUTO: 10.4 FL (ref 7.4–10.4)
POTASSIUM SERPL-SCNC: 4.5 MMOL/L (ref 3.6–5)
PROT SERPL-MCNC: 7.4 GM/DL (ref 6.4–8.2)
PROT UR QL STRIP: (no result)
RBC #/AREA URNS HPF: (no result) /HPF
SODIUM SERPL-SCNC: 136 MMOL/L (ref 135–145)
SP GR UR STRIP: 1.02 (ref 1.02–1.02)
SQUAMOUS #/AREA URNS HPF: (no result) /HPF
WBC # BLD AUTO: 8.7 10^3/UL (ref 4.3–11)
WBC #/AREA URNS HPF: (no result) /HPF
WBC CASTS URNS QL MICRO: (no result) /LPF

## 2021-03-10 PROCEDURE — 85025 COMPLETE CBC W/AUTO DIFF WBC: CPT

## 2021-03-10 PROCEDURE — 83735 ASSAY OF MAGNESIUM: CPT

## 2021-03-10 PROCEDURE — 80053 COMPREHEN METABOLIC PANEL: CPT

## 2021-03-10 PROCEDURE — 70450 CT HEAD/BRAIN W/O DYE: CPT

## 2021-03-10 PROCEDURE — 81000 URINALYSIS NONAUTO W/SCOPE: CPT

## 2021-03-10 PROCEDURE — 36415 COLL VENOUS BLD VENIPUNCTURE: CPT

## 2021-03-10 PROCEDURE — 87088 URINE BACTERIA CULTURE: CPT

## 2021-03-10 NOTE — DIAGNOSTIC IMAGING REPORT
PROCEDURE: CT head without contrast.



TECHNIQUE: Multiple contiguous axial images were obtained through

the brain without the use of intravenous contrast. Auto Exposure

Controls were utilized during the CT exam to meet ALARA standards

for radiation dose reduction. 



INDICATION: Confusion. 



COMPARISON: None.



FINDINGS: No intracranial hemorrhage, mass effect, hydrocephalus

or extra-axial fluid collections. No CT evidence of territorial

infarction. Osseous structures are intact. Mastoids are clear.

Mucosal thickening in the right maxillary sinus.



IMPRESSION: No acute intracranial CT findings.



Dictated by: 



  Dictated on workstation # [id number]

## 2021-03-10 NOTE — ED GENERAL
History of Present Illness


Date Seen by Provider:  Mar 10, 2021


Time Seen by Provider:  08:50


Initial Comments


65-year-old male sent in for "acute renal failure" patient had an appointment 

with his physician yesterday and found to have acute on chronic renal failure.  

Patient himself does not provide much information.  Patient has a history of 

Crohn's disease.  He does not state where he is making urine.  He seems mildly 

confused.  I am unsure of his baseline.  Patient with no acute complaints stated





Allergies and Home Medications


Allergies


Coded Allergies:  


     Penicillins (Verified  Allergy, Severe, EDEMA, 20)


     adhesive tape (Verified  Allergy, Severe, RASH, 20)


     cefazolin (Verified  Allergy, Severe, RASH, 20)


     cephalexin (Verified  Allergy, Severe, RASH, 20)


     morphine (Verified  Allergy, Severe, EDEMA, takes Hydromorphone at home, 

20)


     butabarbital (Verified  Allergy, Unknown, 20)


     butorphanol (Verified  Allergy, Unknown, 20)


     ibuprofen (Verified  Allergy, Unknown, 20)


     piperacillin (Verified  Allergy, Unknown, EDEMA, 20)


     promethazine (Verified  Allergy, Unknown, 20)


     silicone (Verified  Allergy, Unknown, 20)


     zolpidem (Verified  Allergy, Unknown, 20)


     ofloxacin (Verified  Adverse Reaction, Unknown, DIARRHEA, 20)





Home Medications


Acetaminophen 325 Mg Capsule, 650 MG PO Q8H PRN for PAIN-MILD (1-4), (Reported)


Azathioprine 50 Mg Tablet, 50 MG PO DAILY, (Reported)


Famotidine 20 Mg Tablet, 20 MG PO Q24H


   Prescribed by: LETI URENA on 20 1142


Hydromorphone HCl 8 Mg Tablet, 8 MG PO Q6H PRN for PAIN-SEVERE (8-10), 

(Reported)


Levothyroxine Sodium 25 Mcg Tablet, 25 MCG PO DAILY, (Reported)


Lisinopril 5 Mg Tablet, 5 MG PO DAILY, (Reported)


Methadone HCl 10 Mg Tablet, 10 MG PO Q6- 8H, (Reported)


Multivitamin 1 Each Tablet, 1 EACH PO DAILY, (Reported)


Peg 400/Hypromellose/Glycerin 15 Ml Drops, 2 DROPS OU PRN PRN for DRY EYES, 

(Reported)


Pregabalin 75 Mg Capsule, 75 MG PO TID, (Reported)


Temazepam 15 Mg Capsule, 15-30 MG PO HS PRN for SLEEP, (Reported)


Terbinafine HCl 250 Mg Tablet, 250 MG PO DAILY, (Reported)


Venlafaxine HCl 75 Mg Cap.er.24h, 75 MG PO DAILY, (Reported)


   TAKES 75MG +150MG TO EQUAL 225MG DAILY 


Venlafaxine HCl 150 Mg Cap.er.24h, 150 MG PO DAILY, (Reported)


   TAKES 75MG +150MG TO EQUAL 225MG DAILY 





Patient Home Medication List


Home Medication List Reviewed:  Yes





Review of Systems


Review of Systems


Constitutional:  see HPI


EENTM:  no symptoms reported


Respiratory:  no symptoms reported


Cardiovascular:  no symptoms reported


Gastrointestinal:  no symptoms reported


Genitourinary:  no symptoms reported


Musculoskeletal:  no symptoms reported


Skin:  no symptoms reported





Past Medical-Social-Family Hx


Past Med/Social Hx:  Reviewed Nursing Past Med/Soc Hx


Patient Social History


2nd Hand Smoke Exposure:  No


Recent Hopitalizations:  No





Immunizations Up To Date


Date of Pneumonia Vaccine:  Dec 9, 2017


Date of Influenza Vaccine:  Oct 3, 2020





Seasonal Allergies


Seasonal Allergies:  No





Past Medical History


Surgeries:  Yes (X11 ABDOMIAL, EYE)


Appendectomy, Bowel Surgery, Eye Surgery, Gallbladder


Respiratory:  Yes


Currently Using CPAP:  No


Currently Using BIPAP:  No


Cardiac:  Yes (BLOD CLOT, PERICARDITIS)


Neurological:  Yes (BRAIN STEM STROKE)


Stroke


Genitourinary:  No


Gastrointestinal:  Yes


Crohns Disease, Obstructive Bowel


Musculoskeletal:  No


Endocrine:  Yes


Hypothyroidsim


HEENT:  No


Cataract


Loss of Vision:  Denies


Hearing Impairment:  Denies


Cancer:  No


Psychosocial:  No


Integumentary:  No


Blood Disorders:  No





Family Medical History


Asthma





Physical Exam


Vital Signs





Vital Signs - First Documented








 3/10/21





 08:36


 


Temp 36.1


 


Pulse 93


 


Resp 20


 


B/P (MAP) 125/51 (75)


 


Pulse Ox 100


 


O2 Delivery Room Air





Capillary Refill :


Height, Weight, BMI


Height: '"


Weight: lbs. oz. kg; 27.21 BMI


Method:


General Appearance:  Chronically ill, Thin


HEENT:  PERRL/EOMI


Respiratory:  Chest Non Tender, Lungs Clear


Cardiovascular:  Regular Rate, Rhythm, No Edema


Gastrointestinal:  Non Tender, Soft


Extremity:  Normal Range of Motion


Neurologic/Psychiatric:  CNs II-XII Norm as Tested, Other (Patient seems mildly 

lethargic and confused but am unsure of baseline)





Progress/Results/Core Measures


Suspected Sepsis


SIRS


Temperature: 


Pulse:  


Respiratory Rate: 


 


Laboratory Tests


3/10/21 08:55: White Blood Count 8.7


Blood Pressure  / 


Mean: 


 





Laboratory Tests


3/10/21 08:55: 


Creatinine 9.64H, Platelet Count 261, Total Bilirubin 0.3








Results/Orders


Lab Results





Laboratory Tests








Test


 3/10/21


08:55 3/10/21


11:00 Range/Units


 


 


White Blood Count


 8.7 


 


 4.3-11.0


10^3/uL


 


Red Blood Count


 2.49 L


 


 4.35-5.85


10^6/uL


 


Hemoglobin 7.9 L  13.3-17.7  G/DL


 


Hematocrit 25 L  40-54  %


 


Mean Corpuscular Volume 98   80-99  FL


 


Mean Corpuscular Hemoglobin 32   25-34  PG


 


Mean Corpuscular Hemoglobin


Concent 32 


 


 32-36  G/DL





 


Red Cell Distribution Width 13.2   10.0-14.5  %


 


Platelet Count


 261 


 


 130-400


10^3/uL


 


Mean Platelet Volume 10.4   7.4-10.4  FL


 


Immature Granulocyte % (Auto) 1    %


 


Neutrophils (%) (Auto) 69   42-75  %


 


Lymphocytes (%) (Auto) 16   12-44  %


 


Monocytes (%) (Auto) 11   0-12  %


 


Eosinophils (%) (Auto) 3   0-10  %


 


Basophils (%) (Auto) 1   0-10  %


 


Neutrophils # (Auto) 6.0   1.8-7.8  X 10^3


 


Lymphocytes # (Auto) 1.4   1.0-4.0  X 10^3


 


Monocytes # (Auto) 1.0   0.0-1.0  X 10^3


 


Eosinophils # (Auto)


 0.2 


 


 0.0-0.3


10^3/uL


 


Basophils # (Auto)


 0.0 


 


 0.0-0.1


10^3/uL


 


Immature Granulocyte # (Auto)


 0.1 


 


 0.0-0.1


10^3/uL


 


Sodium Level 136   135-145  MMOL/L


 


Potassium Level 4.5   3.6-5.0  MMOL/L


 


Chloride Level 107     MMOL/L


 


Carbon Dioxide Level 9 *L  21-32  MMOL/L


 


Anion Gap 20 H  5-14  MMOL/L


 


Blood Urea Nitrogen 88 H  7-18  MG/DL


 


Creatinine


 9.64 H


 


 0.60-1.30


MG/DL


 


Estimat Glomerular Filtration


Rate 6 


 


  





 


BUN/Creatinine Ratio 9    


 


Glucose Level 94     MG/DL


 


Calcium Level 8.9   8.5-10.1  MG/DL


 


Corrected Calcium 8.7   8.5-10.1  MG/DL


 


Magnesium Level 1.5 L  1.6-2.4  MG/DL


 


Total Bilirubin 0.3   0.1-1.0  MG/DL


 


Aspartate Amino Transf


(AST/SGOT) 10 


 


 5-34  U/L





 


Alanine Aminotransferase


(ALT/SGPT) 5 


 


 0-55  U/L





 


Alkaline Phosphatase 118     U/L


 


Total Protein 7.4   6.4-8.2  GM/DL


 


Albumin 4.3   3.2-4.5  GM/DL


 


Urine Color  YELLOW   


 


Urine Clarity  SL CLOUDY   


 


Urine pH  5.5  5-9  


 


Urine Specific Gravity  1.020  1.016-1.022  


 


Urine Protein  1+ H NEGATIVE  


 


Urine Glucose (UA)  NEGATIVE  NEGATIVE  


 


Urine Ketones  NEGATIVE  NEGATIVE  


 


Urine Nitrite  NEGATIVE  NEGATIVE  


 


Urine Bilirubin  NEGATIVE  NEGATIVE  


 


Urine Urobilinogen  0.2  < = 1.0  MG/DL


 


Urine Leukocyte Esterase  NEGATIVE  NEGATIVE  


 


Urine RBC (Auto)  1+ H NEGATIVE  


 


Urine RBC  0-2   /HPF


 


Urine WBC  5-10 H  /HPF


 


Urine Squamous Epithelial


Cells 


 0-2 


  /HPF





 


Urine Crystals  NONE   /LPF


 


Urine Bacteria  TRACE   /HPF


 


Urine Casts  PRESENT   /LPF


 


Urine Hyaline Casts     /LPF


 


Urine Granular Casts  2-5 H  /LPF


 


Urine Coarse Granular Casts  0-2 H  /LPF


 


Urine White Blood Cell Casts  0-2 H  /LPF


 


Urine Mucus  SMALL H  /LPF


 


Urine Culture Indicated  YES   








My Orders





Orders - AQUINO,THERON L DO


Cbc With Automated Diff (3/10/21 08:52)


Comprehensive Metabolic Panel (3/10/21 08:52)


Magnesium (3/10/21 08:52)


Ua Culture If Indicated (3/10/21 08:52)


Ct Head Wo (3/10/21 09:26)


Lactated Ringers (Lr 1000 Ml Iv Solution (3/10/21 10:23)


Urine Culture (3/10/21 11:00)





Vital Signs/I&O











 3/10/21





 08:36


 


Temp 36.1


 


Pulse 93


 


Resp 20


 


B/P (MAP) 125/51 (75)


 


Pulse Ox 100


 


O2 Delivery Room Air





Capillary Refill :


Progress Note :  


   Time:  11:35


Progress Note


Patient with acute on chronic renal failure.  He will be given a 1 L bolus of IV

fluids and then started IV fluids at 150 mL an hour.  I did call and discuss 

with [facility] where patient has been in the past.  

Patient was accepted and will be transferred via EMS.





Diagnostic Imaging





   Diagonstic Imaging:  CT


   Plain Films/CT/US/NM/MRI:  head


Comments


                 [facility] [place], [place]





NAME:   DAWN YIN


[facility] REC#:   [id number]


ACCOUNT#:   [id number]


PT STATUS:   REG ER


:   1955


PHYSICIAN:   THERON AQUINO DO


ADMIT DATE:   03/10/21/ER FS


                                   ***Draft***


Date of Exam:03/10/21





CT HEAD WO








PROCEDURE: CT head without contrast.





TECHNIQUE: Multiple contiguous axial images were obtained through


the brain without the use of intravenous contrast. Auto Exposure


Controls were utilized during the CT exam to meet ALARA standards


for radiation dose reduction. 





INDICATION: Confusion. 





COMPARISON: None.





FINDINGS: No intracranial hemorrhage, mass effect, hydrocephalus


or extra-axial fluid collections. No CT evidence of territorial


infarction. Osseous structures are intact. Mastoids are clear.


Mucosal thickening in the right maxillary sinus.





IMPRESSION: No acute intracranial CT findings.


   Reviewed:  Reviewed by Me, Reviewed/Discussed





Departure


Impression





   Primary Impression:  


   Acute on chronic renal failure


   Qualified Codes:  N17.9 - Acute kidney failure, unspecified; N18.9 - Chronic 

   kidney disease, unspecified


Disposition:   [facility]


Condition:  Stable





Transfer


Transfer Reason:  Exceeds level of care


Transfer Progress Notes


Discussed with resident physician Dr. Solis, who patiently excepted for 

.  Patient given IV fluids.  Transferred to [facility] in stable condition


Transfer Facility:  


[facility]


Method of Transfer:  EMS





Departure-Patient Inst.


Referrals:  


SELF,CONCHA CHRISTIAN (PCP/Family)


Primary Care Physician











THERON AQUINO DO               Mar 10, 2021 08:52

## 2021-03-29 LAB
ALBUMIN SERPL-MCNC: 3.9 GM/DL (ref 3.2–4.5)
ALP SERPL-CCNC: 69 U/L (ref 40–136)
ALT SERPL-CCNC: 9 U/L (ref 0–55)
BASOPHILS # BLD AUTO: 0 10^3/UL (ref 0–0.1)
BASOPHILS NFR BLD AUTO: 0 % (ref 0–10)
BILIRUB SERPL-MCNC: 0.7 MG/DL (ref 0.1–1)
BUN/CREAT SERPL: 8
CALCIUM SERPL-MCNC: 8.4 MG/DL (ref 8.5–10.1)
CHLORIDE SERPL-SCNC: 98 MMOL/L (ref 98–107)
CO2 SERPL-SCNC: 24 MMOL/L (ref 21–32)
CREAT SERPL-MCNC: 4.17 MG/DL (ref 0.6–1.3)
EOSINOPHIL # BLD AUTO: 0.2 10^3/UL (ref 0–0.3)
EOSINOPHIL NFR BLD AUTO: 3 % (ref 0–10)
GFR SERPLBLD BASED ON 1.73 SQ M-ARVRAT: 15 ML/MIN
GLUCOSE SERPL-MCNC: 102 MG/DL (ref 70–105)
HCT VFR BLD CALC: 30 % (ref 40–54)
HGB BLD-MCNC: 9.6 G/DL (ref 13.3–17.7)
LYMPHOCYTES # BLD AUTO: 0.6 10^3/UL (ref 1–4)
LYMPHOCYTES NFR BLD AUTO: 10 % (ref 12–44)
MANUAL DIFFERENTIAL PERFORMED BLD QL: NO
MCH RBC QN AUTO: 31 PG (ref 25–34)
MCHC RBC AUTO-ENTMCNC: 32 G/DL (ref 32–36)
MCV RBC AUTO: 95 FL (ref 80–99)
MONOCYTES # BLD AUTO: 0.7 10^3/UL (ref 0–1)
MONOCYTES NFR BLD AUTO: 11 % (ref 0–12)
NEUTROPHILS # BLD AUTO: 4.8 10^3/UL (ref 1.8–7.8)
NEUTROPHILS NFR BLD AUTO: 75 % (ref 42–75)
PLATELET # BLD: 182 10^3/UL (ref 130–400)
PMV BLD AUTO: 10.4 FL (ref 9–12.2)
POTASSIUM SERPL-SCNC: 3.9 MMOL/L (ref 3.6–5)
PROT SERPL-MCNC: 6.9 GM/DL (ref 6.4–8.2)
SODIUM SERPL-SCNC: 137 MMOL/L (ref 135–145)
WBC # BLD AUTO: 6.4 10^3/UL (ref 4.3–11)

## 2021-04-06 ENCOUNTER — HOSPITAL ENCOUNTER (OUTPATIENT)
Dept: HOSPITAL 75 - RAD | Age: 66
End: 2021-04-06
Attending: FAMILY MEDICINE
Payer: MEDICARE

## 2021-04-06 DIAGNOSIS — C90.00: Primary | ICD-10-CM

## 2021-04-06 LAB
ALBUMIN SERPL-MCNC: 3.8 GM/DL (ref 3.2–4.5)
ALP SERPL-CCNC: 72 U/L (ref 40–136)
ALT SERPL-CCNC: 10 U/L (ref 0–55)
BASOPHILS # BLD AUTO: 0 10^3/UL (ref 0–0.1)
BASOPHILS NFR BLD AUTO: 1 % (ref 0–10)
BILIRUB SERPL-MCNC: 0.7 MG/DL (ref 0.1–1)
BUN/CREAT SERPL: 6
CALCIUM SERPL-MCNC: 8.1 MG/DL (ref 8.5–10.1)
CHLORIDE SERPL-SCNC: 98 MMOL/L (ref 98–107)
CO2 SERPL-SCNC: 24 MMOL/L (ref 21–32)
CREAT SERPL-MCNC: 7.56 MG/DL (ref 0.6–1.3)
EOSINOPHIL # BLD AUTO: 0.3 10^3/UL (ref 0–0.3)
EOSINOPHIL NFR BLD AUTO: 4 % (ref 0–10)
GFR SERPLBLD BASED ON 1.73 SQ M-ARVRAT: 7 ML/MIN
GLUCOSE SERPL-MCNC: 117 MG/DL (ref 70–105)
HCT VFR BLD CALC: 25 % (ref 40–54)
HGB BLD-MCNC: 8.1 G/DL (ref 13.3–17.7)
LYMPHOCYTES # BLD AUTO: 0.9 10^3/UL (ref 1–4)
LYMPHOCYTES NFR BLD AUTO: 12 % (ref 12–44)
MANUAL DIFFERENTIAL PERFORMED BLD QL: NO
MCH RBC QN AUTO: 30 PG (ref 25–34)
MCHC RBC AUTO-ENTMCNC: 32 G/DL (ref 32–36)
MCV RBC AUTO: 95 FL (ref 80–99)
MONOCYTES # BLD AUTO: 0.5 10^3/UL (ref 0–1)
MONOCYTES NFR BLD AUTO: 7 % (ref 0–12)
NEUTROPHILS # BLD AUTO: 5.6 10^3/UL (ref 1.8–7.8)
NEUTROPHILS NFR BLD AUTO: 76 % (ref 42–75)
PLATELET # BLD: 180 10^3/UL (ref 130–400)
PMV BLD AUTO: 9.6 FL (ref 9–12.2)
POTASSIUM SERPL-SCNC: 4.3 MMOL/L (ref 3.6–5)
PROT SERPL-MCNC: 6.9 GM/DL (ref 6.4–8.2)
SODIUM SERPL-SCNC: 136 MMOL/L (ref 135–145)
WBC # BLD AUTO: 7.3 10^3/UL (ref 4.3–11)

## 2021-04-06 PROCEDURE — 78816 PET IMAGE W/CT FULL BODY: CPT

## 2021-04-06 NOTE — DIAGNOSTIC IMAGING REPORT
INDICATION: 

Multiple myeloma, initial staging.



TECHNIQUE: 

The serum blood glucose level at the time of injection was 110

mg/dL. The patient was administered 14.1 mCi of F-18 FDG

intravenously in the left antecubital location and whole body PET

imaging was performed. Noncontrast CT was also performed for

attenuation correction and anatomic correlation.



COMPARISON: 

No prior PET/CT study is available for comparison.



FINDINGS:

There is symmetric activity throughout the brain. The soft

tissues of the neck are unremarkable. No mediastinal or hilar

hypermetabolism is identified. No pulmonary parenchymal

hypermetabolism is identified. There is physiologic activity

throughout the GI and  tracts of the abdomen and pelvis. There

are innumerable osteolytic lesions throughout the cervical,

thoracic, and lumbar spine. There are lytic lesions involving the

calvarium as well as bilateral ribs. The findings are consistent

with the patient's diagnosis of multiple myeloma. Many of these

lesions do show some hypermetabolic activity. A lytic lesion in

the upper thoracic spine demonstrated an SUV max of 5.8. A right

acetabular lesion is hypermetabolic. There are hypermetabolic rib

lesions as well as right humerus and right scapula. There is some

mild uptake involving the left femur at an area of sclerosis

which may represent bone infarcts. No other suspicious

abnormality is seen.



IMPRESSION: 

There are numerous osteolytic lesions throughout the spine, ribs,

and calvarium as well as the long bones of the extremities,

consistent with the patient's diagnosis of multiple myeloma. Many

of these do show hypermetabolism.



Dictated by: 



  Dictated on workstation # [id number]

## 2021-04-13 LAB
ALBUMIN SERPL-MCNC: 3.5 GM/DL (ref 3.2–4.5)
ALP SERPL-CCNC: 75 U/L (ref 40–136)
ALT SERPL-CCNC: 9 U/L (ref 0–55)
BASOPHILS # BLD AUTO: 0 10^3/UL (ref 0–0.1)
BASOPHILS NFR BLD AUTO: 0 % (ref 0–10)
BILIRUB SERPL-MCNC: 0.5 MG/DL (ref 0.1–1)
BUN/CREAT SERPL: 4
CALCIUM SERPL-MCNC: 8.1 MG/DL (ref 8.5–10.1)
CHLORIDE SERPL-SCNC: 99 MMOL/L (ref 98–107)
CO2 SERPL-SCNC: 28 MMOL/L (ref 21–32)
CREAT SERPL-MCNC: 7.12 MG/DL (ref 0.6–1.3)
EOSINOPHIL # BLD AUTO: 0.3 10^3/UL (ref 0–0.3)
EOSINOPHIL NFR BLD AUTO: 4 % (ref 0–10)
GFR SERPLBLD BASED ON 1.73 SQ M-ARVRAT: 8 ML/MIN
GLUCOSE SERPL-MCNC: 126 MG/DL (ref 70–105)
HCT VFR BLD CALC: 22 % (ref 40–54)
HGB BLD-MCNC: 7.1 G/DL (ref 13.3–17.7)
LYMPHOCYTES # BLD AUTO: 0.5 10^3/UL (ref 1–4)
LYMPHOCYTES NFR BLD AUTO: 7 % (ref 12–44)
MANUAL DIFFERENTIAL PERFORMED BLD QL: NO
MCH RBC QN AUTO: 31 PG (ref 25–34)
MCHC RBC AUTO-ENTMCNC: 32 G/DL (ref 32–36)
MCV RBC AUTO: 97 FL (ref 80–99)
MONOCYTES # BLD AUTO: 0.5 10^3/UL (ref 0–1)
MONOCYTES NFR BLD AUTO: 8 % (ref 0–12)
NEUTROPHILS # BLD AUTO: 5.1 10^3/UL (ref 1.8–7.8)
NEUTROPHILS NFR BLD AUTO: 80 % (ref 42–75)
PLATELET # BLD: 148 10^3/UL (ref 130–400)
PMV BLD AUTO: 10.1 FL (ref 9–12.2)
POTASSIUM SERPL-SCNC: 3.8 MMOL/L (ref 3.6–5)
PROT SERPL-MCNC: 6.5 GM/DL (ref 6.4–8.2)
SODIUM SERPL-SCNC: 139 MMOL/L (ref 135–145)
WBC # BLD AUTO: 6.4 10^3/UL (ref 4.3–11)

## 2021-04-15 ENCOUNTER — HOSPITAL ENCOUNTER (EMERGENCY)
Dept: HOSPITAL 75 - ER FS | Age: 66
Discharge: HOME | End: 2021-04-15
Payer: MEDICARE

## 2021-04-15 VITALS — SYSTOLIC BLOOD PRESSURE: 159 MMHG | DIASTOLIC BLOOD PRESSURE: 72 MMHG

## 2021-04-15 VITALS — HEIGHT: 74.02 IN | WEIGHT: 203.49 LBS | BODY MASS INDEX: 26.11 KG/M2

## 2021-04-15 DIAGNOSIS — Z88.1: ICD-10-CM

## 2021-04-15 DIAGNOSIS — Z99.2: ICD-10-CM

## 2021-04-15 DIAGNOSIS — Z88.6: ICD-10-CM

## 2021-04-15 DIAGNOSIS — Z88.5: ICD-10-CM

## 2021-04-15 DIAGNOSIS — E03.9: ICD-10-CM

## 2021-04-15 DIAGNOSIS — Z88.8: ICD-10-CM

## 2021-04-15 DIAGNOSIS — Z79.890: ICD-10-CM

## 2021-04-15 DIAGNOSIS — C90.00: Primary | ICD-10-CM

## 2021-04-15 DIAGNOSIS — Z88.0: ICD-10-CM

## 2021-04-15 DIAGNOSIS — I10: ICD-10-CM

## 2021-04-15 DIAGNOSIS — Z86.73: ICD-10-CM

## 2021-04-15 DIAGNOSIS — K50.90: ICD-10-CM

## 2021-04-15 LAB
ALBUMIN SERPL-MCNC: 3.8 GM/DL (ref 3.2–4.5)
ALP SERPL-CCNC: 77 U/L (ref 40–136)
ALT SERPL-CCNC: 6 U/L (ref 0–55)
BASOPHILS # BLD AUTO: 0.1 10^3/UL (ref 0–0.1)
BASOPHILS NFR BLD AUTO: 1 % (ref 0–10)
BILIRUB SERPL-MCNC: 0.5 MG/DL (ref 0.1–1)
BUN/CREAT SERPL: 6
CALCIUM SERPL-MCNC: 8.2 MG/DL (ref 8.5–10.1)
CHLORIDE SERPL-SCNC: 104 MMOL/L (ref 98–107)
CO2 SERPL-SCNC: 27 MMOL/L (ref 21–32)
CREAT SERPL-MCNC: 5.68 MG/DL (ref 0.6–1.3)
EOSINOPHIL # BLD AUTO: 0.2 10^3/UL (ref 0–0.3)
EOSINOPHIL NFR BLD AUTO: 2 % (ref 0–10)
EOSINOPHIL NFR BLD MANUAL: 1 %
ERYTHROCYTE [SEDIMENTATION RATE] IN BLOOD: 86 MM/HR (ref 0–30)
GFR SERPLBLD BASED ON 1.73 SQ M-ARVRAT: 10 ML/MIN
GLUCOSE SERPL-MCNC: 120 MG/DL (ref 70–105)
HCT VFR BLD CALC: 29 % (ref 40–54)
HGB BLD-MCNC: 9.6 G/DL (ref 13.3–17.7)
LIPASE SERPL-CCNC: 55 U/L (ref 8–78)
LYMPHOCYTES # BLD AUTO: 0.7 X 10^3 (ref 1–4)
LYMPHOCYTES NFR BLD AUTO: 7 % (ref 12–44)
MANUAL DIFFERENTIAL PERFORMED BLD QL: YES
MCH RBC QN AUTO: 30 PG (ref 25–34)
MCHC RBC AUTO-ENTMCNC: 33 G/DL (ref 32–36)
MCV RBC AUTO: 91 FL (ref 80–99)
MONOCYTES # BLD AUTO: 0.7 X 10^3 (ref 0–1)
MONOCYTES NFR BLD AUTO: 7 % (ref 0–12)
MONOCYTES NFR BLD: 7 %
NEUTROPHILS # BLD AUTO: 8.4 X 10^3 (ref 1.8–7.8)
NEUTROPHILS NFR BLD AUTO: 84 % (ref 42–75)
NEUTS BAND NFR BLD MANUAL: 78 %
NEUTS BAND NFR BLD: 6 %
OVALOCYTES BLD QL SMEAR: SLIGHT
PLATELET # BLD: 223 10^3/UL (ref 130–400)
PMV BLD AUTO: 9.6 FL (ref 7.4–10.4)
POIKILOCYTOSIS BLD QL SMEAR: (no result)
POTASSIUM SERPL-SCNC: 3.8 MMOL/L (ref 3.6–5)
PROT SERPL-MCNC: 6.7 GM/DL (ref 6.4–8.2)
SODIUM SERPL-SCNC: 145 MMOL/L (ref 135–145)
VARIANT LYMPHS NFR BLD MANUAL: 8 %
WBC # BLD AUTO: 10 10^3/UL (ref 4.3–11)

## 2021-04-15 PROCEDURE — 86141 C-REACTIVE PROTEIN HS: CPT

## 2021-04-15 PROCEDURE — 85027 COMPLETE CBC AUTOMATED: CPT

## 2021-04-15 PROCEDURE — 85007 BL SMEAR W/DIFF WBC COUNT: CPT

## 2021-04-15 PROCEDURE — 85652 RBC SED RATE AUTOMATED: CPT

## 2021-04-15 PROCEDURE — 83690 ASSAY OF LIPASE: CPT

## 2021-04-15 PROCEDURE — 36556 INSERT NON-TUNNEL CV CATH: CPT

## 2021-04-15 PROCEDURE — 36415 COLL VENOUS BLD VENIPUNCTURE: CPT

## 2021-04-15 PROCEDURE — 74176 CT ABD & PELVIS W/O CONTRAST: CPT

## 2021-04-15 PROCEDURE — 80053 COMPREHEN METABOLIC PANEL: CPT

## 2021-04-15 NOTE — ED GI
General


Chief Complaint:  Abdominal/GI Problems


Stated Complaint:  ABD PAIN





History of Present Illness


Date Seen by Provider:  Apr 15, 2021


Time Seen by Provider:  04:45


Initial Comments


65-year-old male presents with some left-sided abdominal pain at vomiting.  

Patient's had one episode of vomiting.  Patient is a dialysis patient with 

Crohn's disease with numerous surgeries.  Patient feels like he is kind of 

backed up.  He denies any fevers or chills.





Allergies and Home Medications


Allergies


Coded Allergies:  


     Penicillins (Verified  Allergy, Severe, EDEMA, 6/13/20)


     adhesive tape (Verified  Allergy, Severe, RASH, 6/13/20)


     cefazolin (Verified  Allergy, Severe, RASH, 6/13/20)


     cephalexin (Verified  Allergy, Severe, RASH, 6/13/20)


     morphine (Verified  Allergy, Severe, EDEMA, takes Hydromorphone at home, 

11/18/20)


     butabarbital (Verified  Allergy, Unknown, 6/13/20)


     butorphanol (Verified  Allergy, Unknown, 6/13/20)


     ibuprofen (Verified  Allergy, Unknown, 6/13/20)


     piperacillin (Verified  Allergy, Unknown, EDEMA, 6/13/20)


     promethazine (Verified  Allergy, Unknown, 6/13/20)


     silicone (Verified  Allergy, Unknown, 6/13/20)


     zolpidem (Verified  Allergy, Unknown, 6/13/20)


     ofloxacin (Verified  Adverse Reaction, Unknown, DIARRHEA, 6/13/20)





Home Medications


Acetaminophen 325 Mg Capsule, 650 MG PO Q8H PRN for PAIN-MILD (1-4), (Reported)


Azathioprine 50 Mg Tablet, 50 MG PO DAILY, (Reported)


Famotidine 20 Mg Tablet, 20 MG PO Q24H


   Prescribed by: LETI URENA on 11/20/20 1142


Hydromorphone HCl 8 Mg Tablet, 8 MG PO Q6H PRN for PAIN-SEVERE (8-10), 

(Reported)


Levothyroxine Sodium 25 Mcg Tablet, 25 MCG PO DAILY, (Reported)


Lisinopril 5 Mg Tablet, 5 MG PO DAILY, (Reported)


Methadone HCl 10 Mg Tablet, 10 MG PO Q6- 8H, (Reported)


Multivitamin 1 Each Tablet, 1 EACH PO DAILY, (Reported)


Peg 400/Hypromellose/Glycerin 15 Ml Drops, 2 DROPS OU PRN PRN for DRY EYES, 

(Reported)


Pregabalin 75 Mg Capsule, 75 MG PO TID, (Reported)


Temazepam 15 Mg Capsule, 15-30 MG PO HS PRN for SLEEP, (Reported)


Terbinafine HCl 250 Mg Tablet, 250 MG PO DAILY, (Reported)


Venlafaxine HCl 75 Mg Cap.er.24h, 75 MG PO DAILY, (Reported)


   TAKES 75MG +150MG TO EQUAL 225MG DAILY 


Venlafaxine HCl 150 Mg Cap.er.24h, 150 MG PO DAILY, (Reported)


   TAKES 75MG +150MG TO EQUAL 225MG DAILY 





Patient Home Medication List


Home Medication List Reviewed:  Yes





Review of Systems


Review of Systems


Constitutional:  No chills, No fever


Respiratory:  Denies Cough, Denies Shortness of Air


Cardiovascular:  Denies Chest Pain


Gastrointestinal:  See HPI, Abdominal Pain, Nausea, Vomiting


Genitourinary:  No Symptoms Reported


Musculoskeletal:  no symptoms reported


Skin:  no symptoms reported


Psychiatric/Neurological:  No Symptoms Reported


Endocrine:  No Symptoms Reported





Past Medical-Social-Family Hx


Past Med/Social Hx:  Reviewed Nursing Past Med/Soc Hx


Patient Social History


Alcohol Use:  Denies Use


Smoking Status:  Never a Smoker


2nd Hand Smoke Exposure:  No


Recent Hopitalizations:  No





Immunizations Up To Date


Date of Pneumonia Vaccine:  Dec 9, 2017


Date of Influenza Vaccine:  Oct 3, 2020





Seasonal Allergies


Seasonal Allergies:  No





Past Medical History


Surgeries:  Yes (X11 ABDOMIAL, EYE)


Appendectomy, Bowel Surgery, Eye Surgery, Gallbladder


Respiratory:  Yes


Currently Using CPAP:  No


Currently Using BIPAP:  No


Cardiac:  Yes (BLOD CLOT, PERICARDITIS)


Deep Vein Thrombosis, Pericarditis


Neurological:  Yes (BRAIN STEM STROKE)


Stroke


Genitourinary:  Yes


Renal Failure, Dialysis


Gastrointestinal:  Yes


Crohns Disease, Obstructive Bowel


Musculoskeletal:  No


Endocrine:  Yes


Hypothyroidsim


HEENT:  No


Cataract


Loss of Vision:  Denies


Hearing Impairment:  Denies


Cancer:  Yes


Psychosocial:  No


Integumentary:  No


Blood Disorders:  Yes





Family Medical History


Asthma





Physical Exam


Vital Signs





Vital Signs - First Documented








 4/15/21





 04:41


 


Temp 37.2


 


Pulse 102


 


Resp 16


 


B/P (MAP) 156/89 (111)


 


Pulse Ox 96


 


O2 Delivery Room Air





Capillary Refill :


Height/Weight/BMI


Height: '"


Weight: lbs. oz. kg; 26.00 BMI


Method:


General Appearance:  mild distress


Neck:  non-tender


Respiratory:  lungs clear, normal breath sounds


Cardiovascular:  normal peripheral pulses, regular rate, rhythm


Gastrointestinal:  soft, tenderness (Left upper and lower quadrant), other 

(Numerous abdominal scars from previous surgery)


Extremities:  normal range of motion, non-tender


Neurologic/Psychiatric:  alert, normal mood/affect, oriented x 3


Skin:  warm/dry





Progress/Results/Core Measures


Results/Orders


Lab Results





Laboratory Tests








Test


 4/15/21


04:50 Range/Units


 


 


White Blood Count


 10.0 


 4.3-11.0


10^3/uL


 


Red Blood Count


 3.21 L


 4.35-5.85


10^6/uL


 


Hemoglobin 9.6 L 13.3-17.7  G/DL


 


Hematocrit 29 L 40-54  %


 


Mean Corpuscular Volume 91  80-99  FL


 


Mean Corpuscular Hemoglobin 30  25-34  PG


 


Mean Corpuscular Hemoglobin


Concent 33 


 32-36  G/DL





 


Red Cell Distribution Width 15.9 H 10.0-14.5  %


 


Platelet Count


 223 


 130-400


10^3/uL


 


Mean Platelet Volume 9.6  7.4-10.4  FL


 


Immature Granulocyte % (Auto) 1   %


 


Neutrophils (%) (Auto) 84 H 42-75  %


 


Lymphocytes (%) (Auto) 7 L 12-44  %


 


Monocytes (%) (Auto) 7  0-12  %


 


Eosinophils (%) (Auto) 2  0-10  %


 


Basophils (%) (Auto) 1  0-10  %


 


Neutrophils # (Auto) 8.4 H 1.8-7.8  X 10^3


 


Lymphocytes # (Auto) 0.7 L 1.0-4.0  X 10^3


 


Monocytes # (Auto) 0.7  0.0-1.0  X 10^3


 


Eosinophils # (Auto)


 0.2 


 0.0-0.3


10^3/uL


 


Basophils # (Auto)


 0.1 


 0.0-0.1


10^3/uL


 


Immature Granulocyte # (Auto)


 0.1 


 0.0-0.1


10^3/uL


 


Neutrophils % (Manual) 78   %


 


Lymphocytes % (Manual) 8   %


 


Monocytes % (Manual) 7   %


 


Eosinophils % (Manual) 1   %


 


Band Neutrophils 6   %


 


Poikilocytosis 1+   


 


Elliptocytes SLIGHT   


 


Erythrocyte Sedimentation Rate 86 H 0-30  MM/HR


 


Sodium Level 145  135-145  MMOL/L


 


Potassium Level 3.8  3.6-5.0  MMOL/L


 


Chloride Level 104    MMOL/L


 


Carbon Dioxide Level 27  21-32  MMOL/L


 


Anion Gap 14  5-14  MMOL/L


 


Blood Urea Nitrogen 35 H 7-18  MG/DL


 


Creatinine


 5.68 #H


 0.60-1.30


MG/DL


 


Estimat Glomerular Filtration


Rate 10 


  





 


BUN/Creatinine Ratio 6   


 


Glucose Level 120 H   MG/DL


 


Calcium Level 8.2 L 8.5-10.1  MG/DL


 


Corrected Calcium 8.4 L 8.5-10.1  MG/DL


 


Total Bilirubin 0.5  0.1-1.0  MG/DL


 


Aspartate Amino Transf


(AST/SGOT) 9 


 5-34  U/L





 


Alanine Aminotransferase


(ALT/SGPT) 6 


 0-55  U/L





 


Alkaline Phosphatase 77    U/L


 


C-Reactive Protein 5.02 H <0.50  MG/DL


 


Total Protein 6.7  6.4-8.2  GM/DL


 


Albumin 3.8  3.2-4.5  GM/DL


 


Lipase 55  8-78  U/L








My Orders





Orders - AQUINO,THERON L DO


Cbc With Automated Diff (4/15/21 04:48)


Comprehensive Metabolic Panel (4/15/21 04:48)


Lipase (4/15/21 04:48)


Ua Culture If Indicated (4/15/21 04:48)


Erythrocyte Sedimentation Rate (4/15/21 04:48)


Crp Fs (4/15/21 04:48)


Ondansetron Injection (Zofran Injectio (4/15/21 05:00)


Lactated Ringers (Lr 1000 Ml Iv Solution (4/15/21 04:48)


Ct Abdomen/Pelvis Wo (4/15/21 04:51)


Manual Differential (4/15/21 04:50)


Fentanyl  Inj (Sublimaze Injection) (4/15/21 05:45)


Methylprednisolone Sod Succ (Solu-Medrol (4/15/21 05:45)





Medications Given in ED





Current Medications








 Medications  Dose


 Ordered  Sig/Khalif


 Route  Start Time


 Stop Time Status Last Admin


Dose Admin


 


 Fentanyl Citrate  75 mcg  ONCE  ONCE


 IVP  4/15/21 05:45


 4/15/21 05:46 DC 4/15/21 05:51


75 MCG


 


 Methylprednisolone


 Sodium Succinate  125 mg  ONCE  ONCE


 IVP  4/15/21 05:45


 4/15/21 05:46 DC 4/15/21 05:50


125 MG


 


 Ondansetron HCl  4 mg  ONCE  ONCE


 IVP  4/15/21 05:00


 4/15/21 05:01 DC 4/15/21 04:57


4 MG








Vital Signs/I&O











 4/15/21





 04:41


 


Temp 37.2


 


Pulse 102


 


Resp 16


 


B/P (MAP) 156/89 (111)


 


Pulse Ox 96


 


O2 Delivery Room Air











Progress


Progress Note :  


Progress Note


Patient reports he feels like his bowels are start to move little better.  CT 

shows no bowel obstruction.  It does show mild inflammatory enteritis consistent

with his Crohn's.  Patient has prednisone Zofran and Phenergan at home for 

nausea.  Patient would like to try going home and see if things improve.  If not

he will return for further evaluation and possible admission





Diagnostic Imaging





   Diagonstic Imaging:  CT


   Plain Films/CT/US/NM/MRI:  abdomen


Comments


Inflammatory enteritis consistent with Crohn's disease


Lytic lesions consistent with multiple myeloma


   Review[facility]  [facility]





Departure


Impression





   Primary Impression:  


   Crohn disease


   Qualified Codes:  K50.919 - Crohn's disease, unspecified, with unspecified 

   complications


   Additional Impressions:  


   Multiple myeloma


   Qualified Codes:  C90.00 - Multiple myeloma not having achieved remission


   Dependence on renal dialysis


Disposition:  01 HOME, SELF-CARE


Condition:  Stable





Departure-Patient Inst.


Referrals:  


SELF,CONCHA CHRISTIAN (PCP/Family)


Primary Care Physician


Patient Instructions:  Inflammatory Bowel Disease (DC)





Add. Discharge Instructions:  


Clear liquid diet, advance as tolerated


Return to the ER as needed


Follow-up with your primary care provider in 3 to 4 days for recheck of today's 

symptoms





All discharge instructions reviewed with patient and/or family. Voiced 

understanding.











THERON AQUINO DO               Apr 15, 2021 04:48

## 2021-04-15 NOTE — DIAGNOSTIC IMAGING REPORT
EXAMINATION: CT Abdomen Pelvis without contrast.



TECHNIQUE: Multiple contiguous axial images were obtained through

the abdomen and pelvis without the use of intravenous contrast.

All CT scans use one or more of the following dose optimizing

techniques: automated exposure control, MA and/or KvP adjustment

based on a patient size and exam type, or iterative

reconstruction. 



HISTORY: Abdominal pain, history of Crohn's disease.



COMPARISON: CT abdomen pelvis 12/09/2020



FINDINGS: 



Lung bases: Bibasilar dependent atelectasis.



Solid organs: The liver is normal. The gallbladder is surgically

absent. There is no biliary ductal dilation. Pancreas is normal. 

Spleen is normal. Adrenal glands are normal. The kidneys are

normal without visualized calculus or hydronephrosis.



Bowel: Long segment of small bowel wall thickening and

surrounding inflammatory stranding within the left upper

quadrant. No bowel obstruction. Surgical changes from partial

colectomy. 



Peritoneum: There is mild abdominal ascites and prominent

mesenteric inflammatory fat stranding. No loculated fluid

collection or intra-abdominal free air. No suspicious

lymphadenopathy. 



Vasculature: Calcification of the aorta without aneurysm.



Musculoskeletal: There is a mottled, lytic appearance throughout

the osseous structures which did have a similar appearance on a

prior CT from 12/09/2020. Multilevel degenerative changes of the

spine without acute compression fracture.



Pelvis: The prostate gland is normal. The urinary bladder is

normal.



IMPRESSION:



1. Long segment of small bowel wall thickening and surrounding

inflammatory stranding within the left upper abdomen compatible

with inflammatory enteritis given history of Crohn's disease.

Infectious enteritis could have a similar appearance. 

2. Mild ascites which may be reactive.

3. Redemonstrated lytic appearance throughout the osseous

structures compatible with history of multiple myeloma.

4. Agree with preliminary interpretation.



Dictated by: 



  Dictated on workstation # [id number]

## 2021-04-20 LAB
ALBUMIN SERPL-MCNC: 3.8 GM/DL (ref 3.2–4.5)
ALP SERPL-CCNC: 61 U/L (ref 40–136)
ALT SERPL-CCNC: 8 U/L (ref 0–55)
BASOPHILS # BLD AUTO: 0 10^3/UL (ref 0–0.1)
BASOPHILS NFR BLD AUTO: 0 % (ref 0–10)
BILIRUB SERPL-MCNC: 0.6 MG/DL (ref 0.1–1)
BUN/CREAT SERPL: 5
CALCIUM SERPL-MCNC: 7.8 MG/DL (ref 8.5–10.1)
CHLORIDE SERPL-SCNC: 98 MMOL/L (ref 98–107)
CO2 SERPL-SCNC: 23 MMOL/L (ref 21–32)
CREAT SERPL-MCNC: 6.54 MG/DL (ref 0.6–1.3)
EOSINOPHIL # BLD AUTO: 0.4 10^3/UL (ref 0–0.3)
EOSINOPHIL NFR BLD AUTO: 6 % (ref 0–10)
GFR SERPLBLD BASED ON 1.73 SQ M-ARVRAT: 9 ML/MIN
GLUCOSE SERPL-MCNC: 116 MG/DL (ref 70–105)
HCT VFR BLD CALC: 27 % (ref 40–54)
HGB BLD-MCNC: 8.5 G/DL (ref 13.3–17.7)
LYMPHOCYTES # BLD AUTO: 0.7 10^3/UL (ref 1–4)
LYMPHOCYTES NFR BLD AUTO: 9 % (ref 12–44)
MANUAL DIFFERENTIAL PERFORMED BLD QL: NO
MCH RBC QN AUTO: 30 PG (ref 25–34)
MCHC RBC AUTO-ENTMCNC: 32 G/DL (ref 32–36)
MCV RBC AUTO: 95 FL (ref 80–99)
MONOCYTES # BLD AUTO: 0.5 10^3/UL (ref 0–1)
MONOCYTES NFR BLD AUTO: 7 % (ref 0–12)
NEUTROPHILS # BLD AUTO: 6.1 10^3/UL (ref 1.8–7.8)
NEUTROPHILS NFR BLD AUTO: 78 % (ref 42–75)
PLATELET # BLD: 174 10^3/UL (ref 130–400)
PMV BLD AUTO: 9.9 FL (ref 9–12.2)
POTASSIUM SERPL-SCNC: 4.8 MMOL/L (ref 3.6–5)
PROT SERPL-MCNC: 6.7 GM/DL (ref 6.4–8.2)
SODIUM SERPL-SCNC: 137 MMOL/L (ref 135–145)
WBC # BLD AUTO: 7.8 10^3/UL (ref 4.3–11)

## 2021-04-26 LAB
ALBUMIN SERPL-MCNC: 4.1 GM/DL (ref 3.2–4.5)
ALP SERPL-CCNC: 74 U/L (ref 40–136)
ALT SERPL-CCNC: 7 U/L (ref 0–55)
BASOPHILS # BLD AUTO: 0 10^3/UL (ref 0–0.1)
BASOPHILS NFR BLD AUTO: 0 % (ref 0–10)
BILIRUB SERPL-MCNC: 0.8 MG/DL (ref 0.1–1)
BUN/CREAT SERPL: 5
CALCIUM SERPL-MCNC: 8.7 MG/DL (ref 8.5–10.1)
CHLORIDE SERPL-SCNC: 94 MMOL/L (ref 98–107)
CO2 SERPL-SCNC: 30 MMOL/L (ref 21–32)
CREAT SERPL-MCNC: 3.94 MG/DL (ref 0.6–1.3)
EOSINOPHIL # BLD AUTO: 0.4 10^3/UL (ref 0–0.3)
EOSINOPHIL NFR BLD AUTO: 5 % (ref 0–10)
GFR SERPLBLD BASED ON 1.73 SQ M-ARVRAT: 16 ML/MIN
GLUCOSE SERPL-MCNC: 121 MG/DL (ref 70–105)
HCT VFR BLD CALC: 26 % (ref 40–54)
HGB BLD-MCNC: 8.1 G/DL (ref 13.3–17.7)
LYMPHOCYTES # BLD AUTO: 0.9 10^3/UL (ref 1–4)
LYMPHOCYTES NFR BLD AUTO: 11 % (ref 12–44)
MANUAL DIFFERENTIAL PERFORMED BLD QL: NO
MCH RBC QN AUTO: 30 PG (ref 25–34)
MCHC RBC AUTO-ENTMCNC: 31 G/DL (ref 32–36)
MCV RBC AUTO: 96 FL (ref 80–99)
MONOCYTES # BLD AUTO: 0.6 10^3/UL (ref 0–1)
MONOCYTES NFR BLD AUTO: 7 % (ref 0–12)
NEUTROPHILS # BLD AUTO: 6.2 10^3/UL (ref 1.8–7.8)
NEUTROPHILS NFR BLD AUTO: 76 % (ref 42–75)
PLATELET # BLD: 205 10^3/UL (ref 130–400)
PMV BLD AUTO: 9.7 FL (ref 9–12.2)
POTASSIUM SERPL-SCNC: 3.8 MMOL/L (ref 3.6–5)
PROT SERPL-MCNC: 7.4 GM/DL (ref 6.4–8.2)
SODIUM SERPL-SCNC: 138 MMOL/L (ref 135–145)
WBC # BLD AUTO: 8.2 10^3/UL (ref 4.3–11)

## 2021-05-03 LAB
ALBUMIN SERPL-MCNC: 3.9 GM/DL (ref 3.2–4.5)
ALP SERPL-CCNC: 79 U/L (ref 40–136)
ALT SERPL-CCNC: 8 U/L (ref 0–55)
BASOPHILS # BLD AUTO: 0.1 10^3/UL (ref 0–0.1)
BASOPHILS NFR BLD AUTO: 1 % (ref 0–10)
BILIRUB SERPL-MCNC: 0.7 MG/DL (ref 0.1–1)
BUN/CREAT SERPL: 6
CALCIUM SERPL-MCNC: 7.1 MG/DL (ref 8.5–10.1)
CHLORIDE SERPL-SCNC: 96 MMOL/L (ref 98–107)
CO2 SERPL-SCNC: 29 MMOL/L (ref 21–32)
CREAT SERPL-MCNC: 4.18 MG/DL (ref 0.6–1.3)
EOSINOPHIL # BLD AUTO: 0.2 10^3/UL (ref 0–0.3)
EOSINOPHIL NFR BLD AUTO: 3 % (ref 0–10)
GFR SERPLBLD BASED ON 1.73 SQ M-ARVRAT: 14 ML/MIN
GLUCOSE SERPL-MCNC: 168 MG/DL (ref 70–105)
HCT VFR BLD CALC: 22 % (ref 40–54)
HGB BLD-MCNC: 7.2 G/DL (ref 13.3–17.7)
LYMPHOCYTES # BLD AUTO: 0.4 10^3/UL (ref 1–4)
LYMPHOCYTES NFR BLD AUTO: 6 % (ref 12–44)
MANUAL DIFFERENTIAL PERFORMED BLD QL: NO
MCH RBC QN AUTO: 31 PG (ref 25–34)
MCHC RBC AUTO-ENTMCNC: 32 G/DL (ref 32–36)
MCV RBC AUTO: 96 FL (ref 80–99)
MONOCYTES # BLD AUTO: 0.4 10^3/UL (ref 0–1)
MONOCYTES NFR BLD AUTO: 5 % (ref 0–12)
NEUTROPHILS # BLD AUTO: 5.6 10^3/UL (ref 1.8–7.8)
NEUTROPHILS NFR BLD AUTO: 83 % (ref 42–75)
PLATELET # BLD: 168 10^3/UL (ref 130–400)
PMV BLD AUTO: 9.4 FL (ref 9–12.2)
POTASSIUM SERPL-SCNC: 3.5 MMOL/L (ref 3.6–5)
PROT SERPL-MCNC: 7.1 GM/DL (ref 6.4–8.2)
SODIUM SERPL-SCNC: 136 MMOL/L (ref 135–145)
WBC # BLD AUTO: 6.8 10^3/UL (ref 4.3–11)

## 2021-05-10 LAB
ALBUMIN SERPL-MCNC: 3.9 GM/DL (ref 3.2–4.5)
ALP SERPL-CCNC: 73 U/L (ref 40–136)
ALT SERPL-CCNC: 7 U/L (ref 0–55)
BASOPHILS # BLD AUTO: 0 10^3/UL (ref 0–0.1)
BASOPHILS NFR BLD AUTO: 1 % (ref 0–10)
BILIRUB SERPL-MCNC: 0.7 MG/DL (ref 0.1–1)
BUN/CREAT SERPL: 6
CALCIUM SERPL-MCNC: 7 MG/DL (ref 8.5–10.1)
CHLORIDE SERPL-SCNC: 97 MMOL/L (ref 98–107)
CO2 SERPL-SCNC: 29 MMOL/L (ref 21–32)
CREAT SERPL-MCNC: 4.31 MG/DL (ref 0.6–1.3)
EOSINOPHIL # BLD AUTO: 0.4 10^3/UL (ref 0–0.3)
EOSINOPHIL NFR BLD AUTO: 4 % (ref 0–10)
GFR SERPLBLD BASED ON 1.73 SQ M-ARVRAT: 14 ML/MIN
GLUCOSE SERPL-MCNC: 115 MG/DL (ref 70–105)
HCT VFR BLD CALC: 20 % (ref 40–54)
HGB BLD-MCNC: 6.5 G/DL (ref 13.3–17.7)
LYMPHOCYTES # BLD AUTO: 0.7 10^3/UL (ref 1–4)
LYMPHOCYTES NFR BLD AUTO: 9 % (ref 12–44)
MANUAL DIFFERENTIAL PERFORMED BLD QL: NO
MCH RBC QN AUTO: 31 PG (ref 25–34)
MCHC RBC AUTO-ENTMCNC: 33 G/DL (ref 32–36)
MCV RBC AUTO: 97 FL (ref 80–99)
MONOCYTES # BLD AUTO: 0.6 10^3/UL (ref 0–1)
MONOCYTES NFR BLD AUTO: 7 % (ref 0–12)
NEUTROPHILS # BLD AUTO: 6.2 10^3/UL (ref 1.8–7.8)
NEUTROPHILS NFR BLD AUTO: 77 % (ref 42–75)
PLATELET # BLD: 205 10^3/UL (ref 130–400)
PMV BLD AUTO: 10 FL (ref 9–12.2)
POTASSIUM SERPL-SCNC: 3.4 MMOL/L (ref 3.6–5)
PROT SERPL-MCNC: 6.9 GM/DL (ref 6.4–8.2)
SODIUM SERPL-SCNC: 137 MMOL/L (ref 135–145)
WBC # BLD AUTO: 8 10^3/UL (ref 4.3–11)

## 2021-05-17 LAB
ALBUMIN SERPL-MCNC: 4 GM/DL (ref 3.2–4.5)
ALP SERPL-CCNC: 81 U/L (ref 40–136)
ALT SERPL-CCNC: 7 U/L (ref 0–55)
BASOPHILS # BLD AUTO: 0.1 10^3/UL (ref 0–0.1)
BASOPHILS NFR BLD AUTO: 1 % (ref 0–10)
BILIRUB SERPL-MCNC: 0.7 MG/DL (ref 0.1–1)
BUN/CREAT SERPL: 6
CALCIUM SERPL-MCNC: 8.1 MG/DL (ref 8.5–10.1)
CHLORIDE SERPL-SCNC: 95 MMOL/L (ref 98–107)
CO2 SERPL-SCNC: 28 MMOL/L (ref 21–32)
CREAT SERPL-MCNC: 3.85 MG/DL (ref 0.6–1.3)
EOSINOPHIL # BLD AUTO: 0.4 10^3/UL (ref 0–0.3)
EOSINOPHIL NFR BLD AUTO: 6 % (ref 0–10)
GFR SERPLBLD BASED ON 1.73 SQ M-ARVRAT: 16 ML/MIN
GLUCOSE SERPL-MCNC: 106 MG/DL (ref 70–105)
HCT VFR BLD CALC: 20 % (ref 40–54)
HGB BLD-MCNC: 6.5 G/DL (ref 13.3–17.7)
LYMPHOCYTES # BLD AUTO: 0.9 X 10^3 (ref 1–4)
LYMPHOCYTES NFR BLD AUTO: 13 % (ref 12–44)
MAGNESIUM SERPL-MCNC: 2 MG/DL (ref 1.6–2.4)
MANUAL DIFFERENTIAL PERFORMED BLD QL: NO
MCH RBC QN AUTO: 31 PG (ref 25–34)
MCHC RBC AUTO-ENTMCNC: 32 G/DL (ref 32–36)
MCV RBC AUTO: 97 FL (ref 80–99)
MONOCYTES # BLD AUTO: 0.4 X 10^3 (ref 0–1)
MONOCYTES NFR BLD AUTO: 6 % (ref 0–12)
NEUTROPHILS # BLD AUTO: 4.9 X 10^3 (ref 1.8–7.8)
NEUTROPHILS NFR BLD AUTO: 73 % (ref 42–75)
PLATELET # BLD: 272 10^3/UL (ref 130–400)
PMV BLD AUTO: 9.6 FL (ref 9–12.2)
POTASSIUM SERPL-SCNC: 3.8 MMOL/L (ref 3.6–5)
PROT SERPL-MCNC: 7.4 GM/DL (ref 6.4–8.2)
SODIUM SERPL-SCNC: 135 MMOL/L (ref 135–145)
WBC # BLD AUTO: 6.8 10^3/UL (ref 4.3–11)

## 2021-05-24 LAB
ALBUMIN SERPL-MCNC: 3.9 GM/DL (ref 3.2–4.5)
ALP SERPL-CCNC: 87 U/L (ref 40–136)
ALT SERPL-CCNC: 8 U/L (ref 0–55)
BASOPHILS # BLD AUTO: 0 10^3/UL (ref 0–0.1)
BASOPHILS NFR BLD AUTO: 1 % (ref 0–10)
BILIRUB SERPL-MCNC: 0.7 MG/DL (ref 0.1–1)
BUN/CREAT SERPL: 7
CALCIUM SERPL-MCNC: 7.7 MG/DL (ref 8.5–10.1)
CHLORIDE SERPL-SCNC: 97 MMOL/L (ref 98–107)
CO2 SERPL-SCNC: 32 MMOL/L (ref 21–32)
CREAT SERPL-MCNC: 3.98 MG/DL (ref 0.6–1.3)
EOSINOPHIL # BLD AUTO: 0.4 10^3/UL (ref 0–0.3)
EOSINOPHIL NFR BLD AUTO: 6 % (ref 0–10)
GFR SERPLBLD BASED ON 1.73 SQ M-ARVRAT: 15 ML/MIN
GLUCOSE SERPL-MCNC: 104 MG/DL (ref 70–105)
HCT VFR BLD CALC: 21 % (ref 40–54)
HGB BLD-MCNC: 6.8 G/DL (ref 13.3–17.7)
LYMPHOCYTES # BLD AUTO: 0.7 10^3/UL (ref 1–4)
LYMPHOCYTES NFR BLD AUTO: 11 % (ref 12–44)
MANUAL DIFFERENTIAL PERFORMED BLD QL: NO
MCH RBC QN AUTO: 31 PG (ref 25–34)
MCHC RBC AUTO-ENTMCNC: 32 G/DL (ref 32–36)
MCV RBC AUTO: 96 FL (ref 80–99)
MONOCYTES # BLD AUTO: 0.5 10^3/UL (ref 0–1)
MONOCYTES NFR BLD AUTO: 8 % (ref 0–12)
NEUTROPHILS # BLD AUTO: 4.5 10^3/UL (ref 1.8–7.8)
NEUTROPHILS NFR BLD AUTO: 73 % (ref 42–75)
PLATELET # BLD: 193 10^3/UL (ref 130–400)
PMV BLD AUTO: 10 FL (ref 9–12.2)
POTASSIUM SERPL-SCNC: 4.2 MMOL/L (ref 3.6–5)
PROT SERPL-MCNC: 7 GM/DL (ref 6.4–8.2)
SODIUM SERPL-SCNC: 137 MMOL/L (ref 135–145)
WBC # BLD AUTO: 6.2 10^3/UL (ref 4.3–11)

## 2021-06-01 LAB
ALBUMIN SERPL-MCNC: 3.8 GM/DL (ref 3.2–4.5)
ALP SERPL-CCNC: 80 U/L (ref 40–136)
ALT SERPL-CCNC: 9 U/L (ref 0–55)
BASOPHILS # BLD AUTO: 0 10^3/UL (ref 0–0.1)
BASOPHILS NFR BLD AUTO: 1 % (ref 0–10)
BILIRUB SERPL-MCNC: 0.9 MG/DL (ref 0.1–1)
BUN/CREAT SERPL: 7
CALCIUM SERPL-MCNC: 7.4 MG/DL (ref 8.5–10.1)
CHLORIDE SERPL-SCNC: 100 MMOL/L (ref 98–107)
CO2 SERPL-SCNC: 27 MMOL/L (ref 21–32)
CREAT SERPL-MCNC: 6.57 MG/DL (ref 0.6–1.3)
EOSINOPHIL # BLD AUTO: 0.3 10^3/UL (ref 0–0.3)
EOSINOPHIL NFR BLD AUTO: 6 % (ref 0–10)
GFR SERPLBLD BASED ON 1.73 SQ M-ARVRAT: 9 ML/MIN
GLUCOSE SERPL-MCNC: 97 MG/DL (ref 70–105)
HCT VFR BLD CALC: 20 % (ref 40–54)
HGB BLD-MCNC: 6.5 G/DL (ref 13.3–17.7)
LYMPHOCYTES # BLD AUTO: 0.6 10^3/UL (ref 1–4)
LYMPHOCYTES NFR BLD AUTO: 14 % (ref 12–44)
MANUAL DIFFERENTIAL PERFORMED BLD QL: NO
MCH RBC QN AUTO: 32 PG (ref 25–34)
MCHC RBC AUTO-ENTMCNC: 32 G/DL (ref 32–36)
MCV RBC AUTO: 99 FL (ref 80–99)
MONOCYTES # BLD AUTO: 0.5 10^3/UL (ref 0–1)
MONOCYTES NFR BLD AUTO: 13 % (ref 0–12)
NEUTROPHILS # BLD AUTO: 2.7 10^3/UL (ref 1.8–7.8)
NEUTROPHILS NFR BLD AUTO: 65 % (ref 42–75)
PLATELET # BLD: 145 10^3/UL (ref 130–400)
PMV BLD AUTO: 9.7 FL (ref 9–12.2)
POTASSIUM SERPL-SCNC: 5.5 MMOL/L (ref 3.6–5)
PROT SERPL-MCNC: 6.7 GM/DL (ref 6.4–8.2)
SODIUM SERPL-SCNC: 138 MMOL/L (ref 135–145)
WBC # BLD AUTO: 4.2 10^3/UL (ref 4.3–11)

## 2021-06-07 LAB
ALBUMIN SERPL-MCNC: 3.7 GM/DL (ref 3.2–4.5)
ALP SERPL-CCNC: 89 U/L (ref 40–136)
ALT SERPL-CCNC: 11 U/L (ref 0–55)
BASOPHILS # BLD AUTO: 0 10^3/UL (ref 0–0.1)
BASOPHILS NFR BLD AUTO: 1 % (ref 0–10)
BILIRUB SERPL-MCNC: 0.7 MG/DL (ref 0.1–1)
BUN/CREAT SERPL: 5
CALCIUM SERPL-MCNC: 8.6 MG/DL (ref 8.5–10.1)
CHLORIDE SERPL-SCNC: 98 MMOL/L (ref 98–107)
CO2 SERPL-SCNC: 28 MMOL/L (ref 21–32)
CREAT SERPL-MCNC: 3.76 MG/DL (ref 0.6–1.3)
EOSINOPHIL # BLD AUTO: 0.3 10^3/UL (ref 0–0.3)
EOSINOPHIL NFR BLD AUTO: 6 % (ref 0–10)
GFR SERPLBLD BASED ON 1.73 SQ M-ARVRAT: 16 ML/MIN
GLUCOSE SERPL-MCNC: 113 MG/DL (ref 70–105)
HCT VFR BLD CALC: 22 % (ref 40–54)
HGB BLD-MCNC: 7.3 G/DL (ref 13.3–17.7)
LYMPHOCYTES # BLD AUTO: 0.6 10^3/UL (ref 1–4)
LYMPHOCYTES NFR BLD AUTO: 12 % (ref 12–44)
MANUAL DIFFERENTIAL PERFORMED BLD QL: NO
MCH RBC QN AUTO: 32 PG (ref 25–34)
MCHC RBC AUTO-ENTMCNC: 33 G/DL (ref 32–36)
MCV RBC AUTO: 97 FL (ref 80–99)
MONOCYTES # BLD AUTO: 0.3 10^3/UL (ref 0–1)
MONOCYTES NFR BLD AUTO: 7 % (ref 0–12)
NEUTROPHILS # BLD AUTO: 3.5 10^3/UL (ref 1.8–7.8)
NEUTROPHILS NFR BLD AUTO: 75 % (ref 42–75)
PLATELET # BLD: 143 10^3/UL (ref 130–400)
PMV BLD AUTO: 10.3 FL (ref 9–12.2)
POTASSIUM SERPL-SCNC: 3.6 MMOL/L (ref 3.6–5)
PROT SERPL-MCNC: 6.8 GM/DL (ref 6.4–8.2)
SODIUM SERPL-SCNC: 137 MMOL/L (ref 135–145)
WBC # BLD AUTO: 4.7 10^3/UL (ref 4.3–11)

## 2021-06-14 LAB
ALBUMIN SERPL-MCNC: 3.8 GM/DL (ref 3.2–4.5)
ALP SERPL-CCNC: 80 U/L (ref 40–136)
ALT SERPL-CCNC: 6 U/L (ref 0–55)
BASOPHILS # BLD AUTO: 0 10^3/UL (ref 0–0.1)
BASOPHILS NFR BLD AUTO: 1 % (ref 0–10)
BILIRUB SERPL-MCNC: 0.9 MG/DL (ref 0.1–1)
BUN/CREAT SERPL: 6
CALCIUM SERPL-MCNC: 8.8 MG/DL (ref 8.5–10.1)
CHLORIDE SERPL-SCNC: 97 MMOL/L (ref 98–107)
CO2 SERPL-SCNC: 28 MMOL/L (ref 21–32)
CREAT SERPL-MCNC: 4.17 MG/DL (ref 0.6–1.3)
EOSINOPHIL # BLD AUTO: 0.4 10^3/UL (ref 0–0.3)
EOSINOPHIL NFR BLD AUTO: 8 % (ref 0–10)
GFR SERPLBLD BASED ON 1.73 SQ M-ARVRAT: 15 ML/MIN
GLUCOSE SERPL-MCNC: 83 MG/DL (ref 70–105)
HCT VFR BLD CALC: 24 % (ref 40–54)
HGB BLD-MCNC: 7.7 G/DL (ref 13.3–17.7)
LYMPHOCYTES # BLD AUTO: 0.5 10^3/UL (ref 1–4)
LYMPHOCYTES NFR BLD AUTO: 10 % (ref 12–44)
MANUAL DIFFERENTIAL PERFORMED BLD QL: NO
MCH RBC QN AUTO: 31 PG (ref 25–34)
MCHC RBC AUTO-ENTMCNC: 32 G/DL (ref 32–36)
MCV RBC AUTO: 98 FL (ref 80–99)
MONOCYTES # BLD AUTO: 0.5 10^3/UL (ref 0–1)
MONOCYTES NFR BLD AUTO: 9 % (ref 0–12)
NEUTROPHILS # BLD AUTO: 3.8 10^3/UL (ref 1.8–7.8)
NEUTROPHILS NFR BLD AUTO: 71 % (ref 42–75)
PLATELET # BLD: 158 10^3/UL (ref 130–400)
PMV BLD AUTO: 10 FL (ref 9–12.2)
POTASSIUM SERPL-SCNC: 4.3 MMOL/L (ref 3.6–5)
PROT SERPL-MCNC: 7.2 GM/DL (ref 6.4–8.2)
SODIUM SERPL-SCNC: 137 MMOL/L (ref 135–145)
WBC # BLD AUTO: 5.3 10^3/UL (ref 4.3–11)

## 2021-06-20 ENCOUNTER — HOSPITAL ENCOUNTER (EMERGENCY)
Dept: HOSPITAL 75 - ER FS | Age: 66
Discharge: HOME | End: 2021-06-20
Payer: MEDICARE

## 2021-06-20 VITALS — DIASTOLIC BLOOD PRESSURE: 85 MMHG | SYSTOLIC BLOOD PRESSURE: 149 MMHG

## 2021-06-20 VITALS — HEIGHT: 73.98 IN | BODY MASS INDEX: 25.46 KG/M2 | WEIGHT: 198.42 LBS

## 2021-06-20 DIAGNOSIS — Z79.890: ICD-10-CM

## 2021-06-20 DIAGNOSIS — C90.00: ICD-10-CM

## 2021-06-20 DIAGNOSIS — K50.90: ICD-10-CM

## 2021-06-20 DIAGNOSIS — R06.00: Primary | ICD-10-CM

## 2021-06-20 DIAGNOSIS — E03.9: ICD-10-CM

## 2021-06-20 DIAGNOSIS — J18.9: ICD-10-CM

## 2021-06-20 DIAGNOSIS — Z79.899: ICD-10-CM

## 2021-06-20 DIAGNOSIS — N18.6: ICD-10-CM

## 2021-06-20 DIAGNOSIS — Z86.73: ICD-10-CM

## 2021-06-20 LAB
%HYPO/RBC NFR BLD AUTO: SLIGHT %
ALBUMIN SERPL-MCNC: 3.5 GM/DL (ref 3.2–4.5)
ALP SERPL-CCNC: 79 U/L (ref 40–136)
ALT SERPL-CCNC: 5 U/L (ref 0–55)
BASOPHILS # BLD AUTO: 0 10^3/UL (ref 0–0.1)
BASOPHILS NFR BLD AUTO: 0 % (ref 0–10)
BILIRUB SERPL-MCNC: 0.6 MG/DL (ref 0.1–1)
BUN/CREAT SERPL: 7
CALCIUM SERPL-MCNC: 6.6 MG/DL (ref 8.5–10.1)
CHLORIDE SERPL-SCNC: 104 MMOL/L (ref 98–107)
CO2 SERPL-SCNC: 22 MMOL/L (ref 21–32)
CREAT SERPL-MCNC: 7.57 MG/DL (ref 0.6–1.3)
EOSINOPHIL # BLD AUTO: 0.3 10^3/UL (ref 0–0.3)
EOSINOPHIL NFR BLD AUTO: 6 % (ref 0–10)
EOSINOPHIL NFR BLD MANUAL: 4 %
GFR SERPLBLD BASED ON 1.73 SQ M-ARVRAT: 7 ML/MIN
GLUCOSE SERPL-MCNC: 127 MG/DL (ref 70–105)
HCT VFR BLD CALC: 23 % (ref 40–54)
HGB BLD-MCNC: 7.2 G/DL (ref 13.3–17.7)
LYMPHOCYTES # BLD AUTO: 0.3 X 10^3 (ref 1–4)
LYMPHOCYTES NFR BLD AUTO: 7 % (ref 12–44)
MAGNESIUM SERPL-MCNC: 2 MG/DL (ref 1.6–2.4)
MANUAL DIFFERENTIAL PERFORMED BLD QL: YES
MCH RBC QN AUTO: 32 PG (ref 25–34)
MCHC RBC AUTO-ENTMCNC: 31 G/DL (ref 32–36)
MCV RBC AUTO: 104 FL (ref 80–99)
MONOCYTES # BLD AUTO: 0.4 X 10^3 (ref 0–1)
MONOCYTES NFR BLD AUTO: 8 % (ref 0–12)
MONOCYTES NFR BLD: 3 %
NEUTROPHILS # BLD AUTO: 3.8 X 10^3 (ref 1.8–7.8)
NEUTROPHILS NFR BLD AUTO: 79 % (ref 42–75)
NEUTS BAND NFR BLD MANUAL: 86 %
OVALOCYTES BLD QL SMEAR: SLIGHT
PLATELET # BLD: 136 10^3/UL (ref 130–400)
PMV BLD AUTO: 10.1 FL (ref 7.4–10.4)
POLYCHROMASIA BLD QL SMEAR: (no result)
POTASSIUM SERPL-SCNC: 5.4 MMOL/L (ref 3.6–5)
PROT SERPL-MCNC: 6.5 GM/DL (ref 6.4–8.2)
SODIUM SERPL-SCNC: 142 MMOL/L (ref 135–145)
TOXIC GRANULES BLD QL SMEAR: (no result)
VARIANT LYMPHS NFR BLD MANUAL: 7 %
WBC # BLD AUTO: 4.8 10^3/UL (ref 4.3–11)

## 2021-06-20 PROCEDURE — 83605 ASSAY OF LACTIC ACID: CPT

## 2021-06-20 PROCEDURE — 36415 COLL VENOUS BLD VENIPUNCTURE: CPT

## 2021-06-20 PROCEDURE — 87040 BLOOD CULTURE FOR BACTERIA: CPT

## 2021-06-20 PROCEDURE — 80053 COMPREHEN METABOLIC PANEL: CPT

## 2021-06-20 PROCEDURE — 71250 CT THORAX DX C-: CPT

## 2021-06-20 PROCEDURE — 85027 COMPLETE CBC AUTOMATED: CPT

## 2021-06-20 PROCEDURE — 93005 ELECTROCARDIOGRAM TRACING: CPT

## 2021-06-20 PROCEDURE — 85007 BL SMEAR W/DIFF WBC COUNT: CPT

## 2021-06-20 PROCEDURE — 83735 ASSAY OF MAGNESIUM: CPT

## 2021-06-20 PROCEDURE — 84484 ASSAY OF TROPONIN QUANT: CPT

## 2021-06-20 PROCEDURE — 71045 X-RAY EXAM CHEST 1 VIEW: CPT

## 2021-06-20 NOTE — DIAGNOSTIC IMAGING REPORT
Clinical indication: Patient with shortness of air.



Exam: Portable chest x-ray upright view.



Comparisons: Chest x-ray dated 11/17/2020j`



Findings:



There is interval development of a moderate amount of diffuse

patchy lung infiltrates throughout both lungs. Stable blunting of

left costophrenic angle which may be related to scarring. There

is no right pleural effusion. Pulmonary vasculature is partially

obscured. Cardiac silhouette is within normal limits. There are

degenerative spurs involving the thoracic spine. Port-A-Cath seen

overlying the right chest again seen in the distal superior vena

cava. Interval placement of vascular access catheter overlying

the right chest region.



Impression:

1: There is interval development of diffuse bilateral lung

infiltrates.



2: Interval placement of vascular access catheter on left side in

good position.



3: Pulmonary vasculature does not appear significantly congested

and the heart size is within normal limits.



Dictated by: 



  Dictated on workstation # [id number]

## 2021-06-20 NOTE — ED RESPIRATORY
General


Chief Complaint:  Respiratory Problems


Stated Complaint:  SOB | COUGH


Nursing Triage Note:  


Pt arrival and assisted with WC for c/o SOA. Pt has has Home O2 x 1 week for new




hypoxia diagnosis at rest. Pt to wear 2 L/M per n.c. 24hrs/7days/week. Pt states




he awoke "gurgling".


Source:  patient





History of Present Illness


Date Seen by Provider:  Jun 20, 2021


Time Seen by Provider:  13:00


Initial Comments


Patient is a 65-year-old male with history of Crohn's disease, multiple myeloma,

end-stage renal disease on dialysis who presents with shortness of breath.  

Patient states he has been on home oxygen for 1 week and that he woke up this 

morning with shortness of breath.  He reports gurgling respirations which have 

since improved.  He denies cough, sore throat, fever, chills, nausea vomiting or

sweats.  He denies increased leg pain or swelling.  Patient dialyzes Monday, 

Wednesday and Friday and reports completing a full dialysis session 2 days ago. 

Patient still makes urine.  Patient is a DNR and is in the process of enrolling 

in hospice.


Timing/Duration:  just prior to arrival


Severity:  moderate


Prior Episodes/Possible Cause:  other


Modifying Factors:  Improves With Other


Associated Symptoms:  other





Allergies and Home Medications


Allergies


Coded Allergies:  


     Penicillins (Verified  Allergy, Severe, EDEMA, 6/13/20)


     adhesive tape (Verified  Allergy, Severe, RASH, 6/13/20)


     cefazolin (Verified  Allergy, Severe, RASH, 6/13/20)


     cephalexin (Verified  Allergy, Severe, RASH, 6/13/20)


     morphine (Verified  Allergy, Severe, EDEMA, takes Hydromorphone at home, 

11/18/20)


     butabarbital (Verified  Allergy, Unknown, 6/13/20)


     butorphanol (Verified  Allergy, Unknown, 6/13/20)


     ibuprofen (Verified  Allergy, Unknown, 6/13/20)


     piperacillin (Verified  Allergy, Unknown, EDEMA, 6/13/20)


     promethazine (Verified  Allergy, Unknown, 6/13/20)


     silicone (Verified  Allergy, Unknown, 6/13/20)


     zolpidem (Verified  Allergy, Unknown, 6/13/20)


     ofloxacin (Verified  Adverse Reaction, Unknown, DIARRHEA, 6/13/20)





Home Medications


Acetaminophen 325 Mg Capsule, 650 MG PO Q8H PRN for PAIN-MILD (1-4), (Reported)


Azathioprine 50 Mg Tablet, 50 MG PO DAILY, (Reported)


Famotidine 20 Mg Tablet, 20 MG PO Q24H


   Prescribed by: LETI URENA on 11/20/20 1142


Hydromorphone HCl 8 Mg Tablet, 8 MG PO Q6H PRN for PAIN-SEVERE (8-10), 

(Reported)


Levothyroxine Sodium 25 Mcg Tablet, 25 MCG PO DAILY, (Reported)


Lisinopril 5 Mg Tablet, 5 MG PO DAILY, (Reported)


Methadone HCl 10 Mg Tablet, 10 MG PO Q6- 8H, (Reported)


Multivitamin 1 Each Tablet, 1 EACH PO DAILY, (Reported)


Peg 400/Hypromellose/Glycerin 15 Ml Drops, 2 DROPS OU PRN PRN for DRY EYES, 

(Reported)


Pregabalin 75 Mg Capsule, 75 MG PO TID, (Reported)


Temazepam 15 Mg Capsule, 15-30 MG PO HS PRN for SLEEP, (Reported)


Terbinafine HCl 250 Mg Tablet, 250 MG PO DAILY, (Reported)


Venlafaxine HCl 75 Mg Cap.er.24h, 75 MG PO DAILY, (Reported)


   TAKES 75MG +150MG TO EQUAL 225MG DAILY 


Venlafaxine HCl 150 Mg Cap.er.24h, 150 MG PO DAILY, (Reported)


   TAKES 75MG +150MG TO EQUAL 225MG DAILY 





Patient Home Medication List


Home Medication List Reviewed:  Yes





Review of Systems


Review of Systems


Constitutional:  see HPI


EENTM:  see HPI


Respiratory:  see HPI


Cardiovascular:  see HPI


Gastrointestinal:  see HPI


Genitourinary:  see HPI


Musculoskeletal:  see HPI


Skin:  see HPI


Psychiatric/Neurological:  See HPI


Hematologic/Lymphatic:  See HPI


Immunological/Allergic:  see HPI





All Other Systems Reviewed


Negative Unless Noted:  Yes





Past Medical-Social-Family Hx


Past Med/Social Hx:  Reviewed Nursing Past Med/Soc Hx


Patient Social History


Alcohol Use:  Denies Use


Smoking Status:  Never a Smoker


2nd Hand Smoke Exposure:  No


Recent Infectious Disease Expo:  No


Recent Hopitalizations:  No





Immunizations Up To Date


Date of Pneumonia Vaccine:  Dec 9, 2017


Date of Influenza Vaccine:  Oct 3, 2020





Seasonal Allergies


Seasonal Allergies:  No





Past Medical History


Surgeries:  Yes (X11 ABDOMINAL, EYE)


Appendectomy, Bowel Surgery, Eye Surgery, Gallbladder


Respiratory:  Yes (HYPOXIA DX ON O2@ 2L/M)


Currently Using CPAP:  No


Currently Using BIPAP:  No


Cardiac:  Yes (BLOOD CLOT, PERICARDITIS)


Deep Vein Thrombosis, Pericarditis


Neurological:  Yes (BRAIN STEM STROKE)


Stroke


Genitourinary:  Yes


Renal Failure, Dialysis


Gastrointestinal:  Yes


Crohns Disease, Obstructive Bowel


Musculoskeletal:  No


Endocrine:  Yes


Hypothyroidsim


HEENT:  No


Cataract


Loss of Vision:  Denies


Hearing Impairment:  Denies


Cancer:  Yes


Psychosocial:  No


Integumentary:  No


Blood Disorders:  Yes





Family Medical History


Asthma





Physical Exam





Vital Signs - First Documented








 6/20/21





 13:05


 


Temp 36.6


 


Pulse 99


 


Resp 15


 


B/P (MAP) 164/78 (106)


 


O2 Delivery Nasal Cannula


 


O2 Flow Rate 2.00





Capillary Refill : Less Than 3 Seconds


Height: '"


Weight: lbs. oz. kg; 25.00 BMI


Method:


General Appearance:  no apparent distress


Eyes:  Bilateral Eye Normal Inspection, Bilateral Eye PERRL, Bilateral Eye EOMI


HEENT:  PERRL/EOMI, normal ENT inspection, pharynx normal


Neck:  supple


Respiratory:  no respiratory distress, decreased breath sounds, rhonchi


Cardiovascular:  regular rate, rhythm


Gastrointestinal:  soft, other


Genital/Rectal:  other


Extremities:  no pedal edema


Neurologic/Psychiatric:  no motor/sensory deficits, alert, oriented x 3, 

disoriented x 3


Skin:  warm/dry, other





Focused Exam


Sepsis Stage:  Ruled Out


Lactate Level


6/20/21 13:27: Lactic Acid Level 1.34





Lactic Acid Level





Laboratory Tests








Test


 6/20/21


13:27


 


Lactic Acid Level


 1.34 MMOL/L


(0.50-2.00)











Progress/Results/Core Measures


Suspected Sepsis


Recent Fever Within 48 Hours:  No


Infection Criteria Present:  None


New/Unexplained  Altered Menta:  No


Sepsis Screen:  No Definite Risk


SIRS


Temperature: 


Pulse: 99 


Respiratory Rate: 15


 


Laboratory Tests


6/20/21 13:27: White Blood Count 4.8


Blood Pressure 164 /78 


Mean: 106


 


6/20/21 13:27: Lactic Acid Level 1.34


Laboratory Tests


6/20/21 13:27: 


Creatinine 7.57#H, Platelet Count 136, Total Bilirubin 0.6








Results/Orders


Lab Results





Laboratory Tests








Test


 6/20/21


13:27 Range/Units


 


 


White Blood Count


 4.8 


 4.3-11.0


10^3/uL


 


Red Blood Count


 2.25 L


 4.35-5.85


10^6/uL


 


Hemoglobin 7.2 L 13.3-17.7  G/DL


 


Hematocrit 23 L 40-54  %


 


Mean Corpuscular Volume 104 H 80-99  FL


 


Mean Corpuscular Hemoglobin 32  25-34  PG


 


Mean Corpuscular Hemoglobin


Concent 31 L


 32-36  G/DL





 


Red Cell Distribution Width 17.6 H 10.0-14.5  %


 


Platelet Count


 136 


 130-400


10^3/uL


 


Mean Platelet Volume 10.1  7.4-10.4  FL


 


Immature Granulocyte % (Auto) 1   %


 


Neutrophils (%) (Auto) 79 H 42-75  %


 


Lymphocytes (%) (Auto) 7 L 12-44  %


 


Monocytes (%) (Auto) 8  0-12  %


 


Eosinophils (%) (Auto) 6  0-10  %


 


Basophils (%) (Auto) 0  0-10  %


 


Neutrophils # (Auto) 3.8  1.8-7.8  X 10^3


 


Lymphocytes # (Auto) 0.3 L 1.0-4.0  X 10^3


 


Monocytes # (Auto) 0.4  0.0-1.0  X 10^3


 


Eosinophils # (Auto)


 0.3 


 0.0-0.3


10^3/uL


 


Basophils # (Auto)


 0.0 


 0.0-0.1


10^3/uL


 


Immature Granulocyte # (Auto)


 0.0 


 0.0-0.1


10^3/uL


 


Neutrophils % (Manual) 86   %


 


Lymphocytes % (Manual) 7   %


 


Monocytes % (Manual) 3   %


 


Eosinophils % (Manual) 4   %


 


Toxic Granulation 1+   


 


Polychromasia MODERATE   


 


Hypochromasia SLIGHT   


 


Elliptocytes SLIGHT   


 


Sodium Level 142  135-145  MMOL/L


 


Potassium Level 5.4 H 3.6-5.0  MMOL/L


 


Chloride Level 104    MMOL/L


 


Carbon Dioxide Level 22  21-32  MMOL/L


 


Anion Gap 16 H 5-14  MMOL/L


 


Blood Urea Nitrogen 51 H 7-18  MG/DL


 


Creatinine


 7.57 #H


 0.60-1.30


MG/DL


 


Estimat Glomerular Filtration


Rate 7 


  





 


BUN/Creatinine Ratio 7   


 


Glucose Level 127 H   MG/DL


 


Lactic Acid Level


 1.34 


 0.50-2.00


MMOL/L


 


Calcium Level 6.6 L 8.5-10.1  MG/DL


 


Corrected Calcium 7.0 L 8.5-10.1  MG/DL


 


Magnesium Level 2.0  1.6-2.4  MG/DL


 


Total Bilirubin 0.6  0.1-1.0  MG/DL


 


Aspartate Amino Transf


(AST/SGOT) 12 


 5-34  U/L





 


Alanine Aminotransferase


(ALT/SGPT) 5 


 0-55  U/L





 


Alkaline Phosphatase 79    U/L


 


Troponin I < 0.30  <0.30  NG/ML


 


Total Protein 6.5  6.4-8.2  GM/DL


 


Albumin 3.5  3.2-4.5  GM/DL








My Orders





Orders - HARLEEN GARDNER DO


Cbc With Automated Diff (6/20/21 13:12)


Comprehensive Metabolic Panel (6/20/21 13:12)


Chest 1 View Ap/Pa Only (6/20/21 13:12)


Troponin I Fs (6/20/21 13:12)


Ekg Tracing (6/20/21 13:12)


Magnesium (6/20/21 13:12)


Manual Differential (6/20/21 13:27)


Lactic Acid Analyzer (6/20/21 14:31)


Blood Culture (6/20/21 14:31)


Ct Chest Wo (6/20/21 14:31)


Doxycycline Hyclate Tablet (Vibramycin T (6/20/21 16:15)





Vital Signs/I&O











 6/20/21





 13:05


 


Temp 36.6


 


Pulse 99


 


Resp 15


 


B/P (MAP) 164/78 (106)


 


O2 Delivery Nasal Cannula


 


O2 Flow Rate 2.00





Capillary Refill : Less Than 3 Seconds








Blood Pressure Mean:                    106











Departure


Communication (Admissions)


Chest x-ray: Bilateral patchy infiltrates.


CT chest: Diffuse bilateral patchy infiltrates, lymphadenopathy, infectious 

versus inflammatory.  Multiple osteolytic lesions.  Complete radiology report 

reviewed.





Patient with dyspnea with multiple myeloma abnormal chest x-ray.  Patient is 

afebrile does not require increased oxygen.  He maintains O2 saturation greater 

than 95% on 2 L.  He states he feels better than this morning and requests 

discharge home.  Etiology is not clear.  IV contrast is contraindicated.  

Doxycycline given for possible pneumonic infiltrates.  Patient has follow-up 

plans with his PCP tomorrow and oncologist later this week.  Return precautions 

reviewed.  Patient verbalizes understanding agreement discharge instructions 

prior to departure.





Impression





   Primary Impression:  


   Dyspnea


   Additional Impressions:  


   Pneumonitis


   Multiple myeloma


Disposition:  01 HOME, SELF-CARE


Condition:  Improved





Departure-Patient Inst.


Decision time for Depature:  16:18


Referrals:  


CONCHA BEVERLY MD (PCP/Family)


Primary Care Physician


Patient Instructions:  Pneumonitis (DC)





Add. Discharge Instructions:  


You were evaluated in the emergency department for shortness of breath.  EKG lab

and imaging studies were performed.  The exact cause of your symptoms has not 

been determined but may be related to Ultima myeloma versus possible infection. 

Please continue home medications, oxygen use and take antibiotics as directed.  

Follow-up with your PCP tomorrow as scheduled and your oncologist later this 

week.  Return to the ED if new or worsening symptoms.





All discharge instructions reviewed with patient and/or family. Voiced 

understanding.


Scripts


Doxycycline Hyclate (Doxycycline Hyclate) 100 Mg Tablet


100 MG PO BID, #60 TAB


   Prov: HARLEEN GARDNER DO         6/20/21











HARLEEN GARDNER DO                   Jun 20, 2021 14:31

## 2021-06-20 NOTE — DIAGNOSTIC IMAGING REPORT
CLINICAL INDICATION: Patient with multiple myeloma and shortness

of breath.



EXAM: CT scan of the chest performed without IV contrast.

Sagittal and coronal reformatted images were created. All CT

scans use one or more of the following dose optimizing

techniques: automated exposure control, MA and/or KvP adjustment

based on patient size and exam type or iterative reconstruction. 





COMPARISON: Whole-body PET/CT scan dated 04/06/2021.



FINDINGS: There is diffuse patchy groundglass opacification

throughout both lungs, which is worse in the right upper

lobe/right lung apex region. There is also patchy areas of denser

consolidation in left lung apex.



There is interlobular septal thickening involving the lung apices

and lung bases. There are small bilateral pleural effusions.

There is mild atelectasis involving the posterior aspects of both

lungs. Pulmonary bronchi show no significant abnormality. There

are multiple prominent left nodes in the mediastinal and hilar

region. Vascular access catheter seen overlying left chest with

tip in the midsuperior vena cava region. Partially visualized

right central line seen.



There are numerous lytic lesions of varying sizes seen throughout

the axial skeleton. This correlates with patient's history of

multiple myeloma. There is no definite acute fracture seen.



There is no axillary lymphadenopathy.



Visualized portions of the upper abdomen show no significant

abnormality. There is a small amount of fluid within the mid

thoracic esophagus. Gynecomastia noted, bilaterally. There are

hypertrophic spurs involving the thoracic spine.



IMPRESSION:

1: There are diffuse patchy lung opacities and infiltrates seen

throughout both lungs with right upper lobe affected the most.

These findings may be related to infectious or inflammatory

process. Pulmonary congestion may also be considered. There is

lymphadenopathy.



2: There is interlobular septal thickening seen bilaterally and

small bilateral pleural effusions which may be related to

pulmonary congestion.



3: There is numerous lytic lesions seen throughout the visualized

axial skeleton which may be related to multiple myeloma.



Dictated by: 



  Dictated on workstation # [id number]

## 2021-06-21 LAB
ALBUMIN SERPL-MCNC: 3.7 GM/DL (ref 3.2–4.5)
ALP SERPL-CCNC: 73 U/L (ref 40–136)
ALT SERPL-CCNC: 8 U/L (ref 0–55)
BASOPHILS # BLD AUTO: 0 10^3/UL (ref 0–0.1)
BASOPHILS NFR BLD AUTO: 1 % (ref 0–10)
BILIRUB SERPL-MCNC: 0.8 MG/DL (ref 0.1–1)
BUN/CREAT SERPL: 6
CALCIUM SERPL-MCNC: 8 MG/DL (ref 8.5–10.1)
CHLORIDE SERPL-SCNC: 96 MMOL/L (ref 98–107)
CO2 SERPL-SCNC: 29 MMOL/L (ref 21–32)
CREAT SERPL-MCNC: 4.05 MG/DL (ref 0.6–1.3)
EOSINOPHIL # BLD AUTO: 0.2 10^3/UL (ref 0–0.3)
EOSINOPHIL NFR BLD AUTO: 4 % (ref 0–10)
GFR SERPLBLD BASED ON 1.73 SQ M-ARVRAT: 15 ML/MIN
GLUCOSE SERPL-MCNC: 102 MG/DL (ref 70–105)
HCT VFR BLD CALC: 21 % (ref 40–54)
HGB BLD-MCNC: 6.6 G/DL (ref 13.3–17.7)
LYMPHOCYTES # BLD AUTO: 0.4 10^3/UL (ref 1–4)
LYMPHOCYTES NFR BLD AUTO: 7 % (ref 12–44)
MANUAL DIFFERENTIAL PERFORMED BLD QL: NO
MCH RBC QN AUTO: 32 PG (ref 25–34)
MCHC RBC AUTO-ENTMCNC: 32 G/DL (ref 32–36)
MCV RBC AUTO: 101 FL (ref 80–99)
MONOCYTES # BLD AUTO: 0.4 10^3/UL (ref 0–1)
MONOCYTES NFR BLD AUTO: 8 % (ref 0–12)
NEUTROPHILS # BLD AUTO: 4.3 10^3/UL (ref 1.8–7.8)
NEUTROPHILS NFR BLD AUTO: 79 % (ref 42–75)
PLATELET # BLD: 139 10^3/UL (ref 130–400)
PMV BLD AUTO: 10.2 FL (ref 9–12.2)
POTASSIUM SERPL-SCNC: 4.2 MMOL/L (ref 3.6–5)
PROT SERPL-MCNC: 6.9 GM/DL (ref 6.4–8.2)
SODIUM SERPL-SCNC: 136 MMOL/L (ref 135–145)
WBC # BLD AUTO: 5.5 10^3/UL (ref 4.3–11)

## 2021-06-23 ENCOUNTER — HOSPITAL ENCOUNTER (OUTPATIENT)
Dept: HOSPITAL 75 - CARD | Age: 66
End: 2021-06-23
Attending: FAMILY MEDICINE
Payer: MEDICARE

## 2021-06-23 DIAGNOSIS — I08.0: Primary | ICD-10-CM

## 2021-06-23 PROCEDURE — 93306 TTE W/DOPPLER COMPLETE: CPT

## 2021-06-24 ENCOUNTER — HOSPITAL ENCOUNTER (OUTPATIENT)
Dept: HOSPITAL 75 - ONC | Age: 66
LOS: 3 days | Discharge: HOME | End: 2021-06-27
Attending: INTERNAL MEDICINE
Payer: MEDICARE

## 2021-06-24 VITALS — BODY MASS INDEX: 27.63 KG/M2 | HEIGHT: 72 IN | WEIGHT: 204 LBS

## 2021-06-24 DIAGNOSIS — C90.00: Primary | ICD-10-CM

## 2021-06-24 DIAGNOSIS — M89.9: ICD-10-CM

## 2021-06-24 PROCEDURE — 96372 THER/PROPH/DIAG INJ SC/IM: CPT

## 2021-06-24 PROCEDURE — 82232 ASSAY OF BETA-2 PROTEIN: CPT

## 2021-06-24 PROCEDURE — 83735 ASSAY OF MAGNESIUM: CPT

## 2021-06-24 PROCEDURE — 99213 OFFICE O/P EST LOW 20 MIN: CPT

## 2021-06-24 PROCEDURE — 96402 CHEMO HORMON ANTINEOPL SQ/IM: CPT

## 2021-06-24 PROCEDURE — 80053 COMPREHEN METABOLIC PANEL: CPT

## 2021-06-24 PROCEDURE — 86922 COMPATIBILITY TEST ANTIGLOB: CPT

## 2021-06-24 PROCEDURE — 86334 IMMUNOFIX E-PHORESIS SERUM: CPT

## 2021-06-24 PROCEDURE — 96401 CHEMO ANTI-NEOPL SQ/IM: CPT

## 2021-06-24 PROCEDURE — 36591 DRAW BLOOD OFF VENOUS DEVICE: CPT

## 2021-06-24 PROCEDURE — 86901 BLOOD TYPING SEROLOGIC RH(D): CPT

## 2021-06-24 PROCEDURE — 82784 ASSAY IGA/IGD/IGG/IGM EACH: CPT

## 2021-06-24 PROCEDURE — 84155 ASSAY OF PROTEIN SERUM: CPT

## 2021-06-24 PROCEDURE — 85025 COMPLETE CBC W/AUTO DIFF WBC: CPT

## 2021-06-24 PROCEDURE — 86850 RBC ANTIBODY SCREEN: CPT

## 2021-06-24 PROCEDURE — 36430 TRANSFUSION BLD/BLD COMPNT: CPT

## 2021-06-24 PROCEDURE — 84165 PROTEIN E-PHORESIS SERUM: CPT

## 2021-06-24 PROCEDURE — 86920 COMPATIBILITY TEST SPIN: CPT

## 2021-06-24 PROCEDURE — 83883 ASSAY NEPHELOMETRY NOT SPEC: CPT

## 2021-06-24 PROCEDURE — 96365 THER/PROPH/DIAG IV INF INIT: CPT

## 2021-06-24 PROCEDURE — 86900 BLOOD TYPING SEROLOGIC ABO: CPT

## 2021-06-28 LAB
ALBUMIN SERPL-MCNC: 3.7 GM/DL (ref 3.2–4.5)
ALP SERPL-CCNC: 80 U/L (ref 40–136)
ALT SERPL-CCNC: 12 U/L (ref 0–55)
BASOPHILS # BLD AUTO: 0 10^3/UL (ref 0–0.1)
BASOPHILS NFR BLD AUTO: 0 % (ref 0–10)
BILIRUB SERPL-MCNC: 0.7 MG/DL (ref 0.1–1)
BUN/CREAT SERPL: 7
CALCIUM SERPL-MCNC: 8.5 MG/DL (ref 8.5–10.1)
CHLORIDE SERPL-SCNC: 98 MMOL/L (ref 98–107)
CO2 SERPL-SCNC: 26 MMOL/L (ref 21–32)
CREAT SERPL-MCNC: 4.46 MG/DL (ref 0.6–1.3)
EOSINOPHIL # BLD AUTO: 0.4 10^3/UL (ref 0–0.3)
EOSINOPHIL NFR BLD AUTO: 6 % (ref 0–10)
GFR SERPLBLD BASED ON 1.73 SQ M-ARVRAT: 13 ML/MIN
GLUCOSE SERPL-MCNC: 106 MG/DL (ref 70–105)
HCT VFR BLD CALC: 27 % (ref 40–54)
HGB BLD-MCNC: 8.4 G/DL (ref 13.3–17.7)
LYMPHOCYTES # BLD AUTO: 0.5 X 10^3 (ref 1–4)
LYMPHOCYTES NFR BLD AUTO: 7 % (ref 12–44)
MANUAL DIFFERENTIAL PERFORMED BLD QL: NO
MCH RBC QN AUTO: 32 PG (ref 25–34)
MCHC RBC AUTO-ENTMCNC: 32 G/DL (ref 32–36)
MCV RBC AUTO: 100 FL (ref 80–99)
MONOCYTES # BLD AUTO: 0.7 X 10^3 (ref 0–1)
MONOCYTES NFR BLD AUTO: 10 % (ref 0–12)
NEUTROPHILS # BLD AUTO: 5.1 X 10^3 (ref 1.8–7.8)
NEUTROPHILS NFR BLD AUTO: 76 % (ref 42–75)
PLATELET # BLD: 135 10^3/UL (ref 130–400)
PMV BLD AUTO: 10.2 FL (ref 9–12.2)
POTASSIUM SERPL-SCNC: 4.5 MMOL/L (ref 3.6–5)
PROT SERPL-MCNC: 6.7 GM/DL (ref 6.4–8.2)
SODIUM SERPL-SCNC: 136 MMOL/L (ref 135–145)
WBC # BLD AUTO: 6.7 10^3/UL (ref 4.3–11)

## 2021-07-06 LAB
ALBUMIN SERPL-MCNC: 3.7 GM/DL (ref 3.2–4.5)
ALP SERPL-CCNC: 76 U/L (ref 40–136)
ALT SERPL-CCNC: 12 U/L (ref 0–55)
BASOPHILS # BLD AUTO: 0 10^3/UL (ref 0–0.1)
BASOPHILS NFR BLD AUTO: 0 % (ref 0–10)
BILIRUB SERPL-MCNC: 0.9 MG/DL (ref 0.1–1)
BUN/CREAT SERPL: 7
CALCIUM SERPL-MCNC: 8.1 MG/DL (ref 8.5–10.1)
CHLORIDE SERPL-SCNC: 95 MMOL/L (ref 98–107)
CO2 SERPL-SCNC: 25 MMOL/L (ref 21–32)
CREAT SERPL-MCNC: 6.47 MG/DL (ref 0.6–1.3)
EOSINOPHIL # BLD AUTO: 0.5 10^3/UL (ref 0–0.3)
EOSINOPHIL NFR BLD AUTO: 6 % (ref 0–10)
GFR SERPLBLD BASED ON 1.73 SQ M-ARVRAT: 9 ML/MIN
GLUCOSE SERPL-MCNC: 106 MG/DL (ref 70–105)
HCT VFR BLD CALC: 28 % (ref 40–54)
HGB BLD-MCNC: 8.6 G/DL (ref 13.3–17.7)
LYMPHOCYTES # BLD AUTO: 0.7 10^3/UL (ref 1–4)
LYMPHOCYTES NFR BLD AUTO: 9 % (ref 12–44)
MANUAL DIFFERENTIAL PERFORMED BLD QL: NO
MCH RBC QN AUTO: 32 PG (ref 25–34)
MCHC RBC AUTO-ENTMCNC: 31 G/DL (ref 32–36)
MCV RBC AUTO: 106 FL (ref 80–99)
MONOCYTES # BLD AUTO: 1.1 10^3/UL (ref 0–1)
MONOCYTES NFR BLD AUTO: 15 % (ref 0–12)
NEUTROPHILS # BLD AUTO: 5.1 10^3/UL (ref 1.8–7.8)
NEUTROPHILS NFR BLD AUTO: 68 % (ref 42–75)
PLATELET # BLD: 146 10^3/UL (ref 130–400)
PMV BLD AUTO: 10.4 FL (ref 9–12.2)
POTASSIUM SERPL-SCNC: 5.2 MMOL/L (ref 3.6–5)
PROT SERPL-MCNC: 7.1 GM/DL (ref 6.4–8.2)
SODIUM SERPL-SCNC: 134 MMOL/L (ref 135–145)
WBC # BLD AUTO: 7.5 10^3/UL (ref 4.3–11)

## 2021-07-12 LAB
ALBUMIN SERPL-MCNC: 3.8 GM/DL (ref 3.2–4.5)
ALP SERPL-CCNC: 85 U/L (ref 40–136)
ALT SERPL-CCNC: 10 U/L (ref 0–55)
BASOPHILS # BLD AUTO: 0 10^3/UL (ref 0–0.1)
BASOPHILS NFR BLD AUTO: 0 % (ref 0–10)
BILIRUB SERPL-MCNC: 0.8 MG/DL (ref 0.1–1)
BUN/CREAT SERPL: 6
CALCIUM SERPL-MCNC: 9.2 MG/DL (ref 8.5–10.1)
CHLORIDE SERPL-SCNC: 98 MMOL/L (ref 98–107)
CO2 SERPL-SCNC: 29 MMOL/L (ref 21–32)
CREAT SERPL-MCNC: 3.79 MG/DL (ref 0.6–1.3)
EOSINOPHIL # BLD AUTO: 0.4 10^3/UL (ref 0–0.3)
EOSINOPHIL NFR BLD AUTO: 5 % (ref 0–10)
GFR SERPLBLD BASED ON 1.73 SQ M-ARVRAT: 16 ML/MIN
GLUCOSE SERPL-MCNC: 91 MG/DL (ref 70–105)
HCT VFR BLD CALC: 26 % (ref 40–54)
HGB BLD-MCNC: 7.7 G/DL (ref 13.3–17.7)
LYMPHOCYTES # BLD AUTO: 0.6 10^3/UL (ref 1–4)
LYMPHOCYTES NFR BLD AUTO: 8 % (ref 12–44)
MANUAL DIFFERENTIAL PERFORMED BLD QL: NO
MCH RBC QN AUTO: 32 PG (ref 25–34)
MCHC RBC AUTO-ENTMCNC: 30 G/DL (ref 32–36)
MCV RBC AUTO: 106 FL (ref 80–99)
MONOCYTES # BLD AUTO: 0.7 10^3/UL (ref 0–1)
MONOCYTES NFR BLD AUTO: 9 % (ref 0–12)
NEUTROPHILS # BLD AUTO: 5.9 10^3/UL (ref 1.8–7.8)
NEUTROPHILS NFR BLD AUTO: 77 % (ref 42–75)
PLATELET # BLD: 121 10^3/UL (ref 130–400)
PMV BLD AUTO: 10.4 FL (ref 9–12.2)
POTASSIUM SERPL-SCNC: 4.2 MMOL/L (ref 3.6–5)
PROT SERPL-MCNC: 6.9 GM/DL (ref 6.4–8.2)
SODIUM SERPL-SCNC: 138 MMOL/L (ref 135–145)
WBC # BLD AUTO: 7.7 10^3/UL (ref 4.3–11)

## 2021-07-15 LAB — MAGNESIUM SERPL-MCNC: 2.2 MG/DL (ref 1.6–2.4)

## 2021-07-16 ENCOUNTER — HOSPITAL ENCOUNTER (OUTPATIENT)
Dept: HOSPITAL 75 - ONC | Age: 66
LOS: 3 days | Discharge: HOME | End: 2021-07-19
Attending: INTERNAL MEDICINE
Payer: MEDICARE

## 2021-07-16 DIAGNOSIS — M89.9: ICD-10-CM

## 2021-07-16 DIAGNOSIS — D64.9: ICD-10-CM

## 2021-07-16 DIAGNOSIS — C90.00: Primary | ICD-10-CM

## 2021-07-16 PROCEDURE — 96401 CHEMO ANTI-NEOPL SQ/IM: CPT

## 2021-07-16 PROCEDURE — 83735 ASSAY OF MAGNESIUM: CPT

## 2021-07-16 PROCEDURE — 85025 COMPLETE CBC W/AUTO DIFF WBC: CPT

## 2021-07-16 PROCEDURE — 99213 OFFICE O/P EST LOW 20 MIN: CPT

## 2021-07-16 PROCEDURE — 80053 COMPREHEN METABOLIC PANEL: CPT

## 2021-07-19 LAB
ALBUMIN SERPL-MCNC: 3.7 GM/DL (ref 3.2–4.5)
ALP SERPL-CCNC: 71 U/L (ref 40–136)
ALT SERPL-CCNC: 11 U/L (ref 0–55)
BASOPHILS # BLD AUTO: 0 10^3/UL (ref 0–0.1)
BASOPHILS NFR BLD AUTO: 0 % (ref 0–10)
BILIRUB SERPL-MCNC: 0.8 MG/DL (ref 0.1–1)
BUN/CREAT SERPL: 6
CALCIUM SERPL-MCNC: 8.5 MG/DL (ref 8.5–10.1)
CHLORIDE SERPL-SCNC: 98 MMOL/L (ref 98–107)
CO2 SERPL-SCNC: 29 MMOL/L (ref 21–32)
CREAT SERPL-MCNC: 4.36 MG/DL (ref 0.6–1.3)
EOSINOPHIL # BLD AUTO: 0.5 10^3/UL (ref 0–0.3)
EOSINOPHIL NFR BLD AUTO: 6 % (ref 0–10)
GFR SERPLBLD BASED ON 1.73 SQ M-ARVRAT: 14 ML/MIN
GLUCOSE SERPL-MCNC: 92 MG/DL (ref 70–105)
HCT VFR BLD CALC: 23 % (ref 40–54)
HGB BLD-MCNC: 7.1 G/DL (ref 13.3–17.7)
LYMPHOCYTES # BLD AUTO: 0.6 10^3/UL (ref 1–4)
LYMPHOCYTES NFR BLD AUTO: 7 % (ref 12–44)
MANUAL DIFFERENTIAL PERFORMED BLD QL: NO
MCH RBC QN AUTO: 33 PG (ref 25–34)
MCHC RBC AUTO-ENTMCNC: 31 G/DL (ref 32–36)
MCV RBC AUTO: 108 FL (ref 80–99)
MONOCYTES # BLD AUTO: 0.7 10^3/UL (ref 0–1)
MONOCYTES NFR BLD AUTO: 9 % (ref 0–12)
NEUTROPHILS # BLD AUTO: 6.3 10^3/UL (ref 1.8–7.8)
NEUTROPHILS NFR BLD AUTO: 77 % (ref 42–75)
PLATELET # BLD: 139 10^3/UL (ref 130–400)
PMV BLD AUTO: 10 FL (ref 9–12.2)
POTASSIUM SERPL-SCNC: 4.5 MMOL/L (ref 3.6–5)
PROT SERPL-MCNC: 6.7 GM/DL (ref 6.4–8.2)
SODIUM SERPL-SCNC: 136 MMOL/L (ref 135–145)
WBC # BLD AUTO: 8.1 10^3/UL (ref 4.3–11)

## 2021-07-20 ENCOUNTER — HOSPITAL ENCOUNTER (OUTPATIENT)
Dept: HOSPITAL 75 - RAD | Age: 66
End: 2021-07-20
Attending: INTERNAL MEDICINE
Payer: MEDICARE

## 2021-07-20 DIAGNOSIS — M47.817: ICD-10-CM

## 2021-07-20 DIAGNOSIS — M48.061: ICD-10-CM

## 2021-07-20 DIAGNOSIS — M47.816: ICD-10-CM

## 2021-07-20 DIAGNOSIS — M47.814: ICD-10-CM

## 2021-07-20 DIAGNOSIS — M51.26: ICD-10-CM

## 2021-07-20 DIAGNOSIS — M51.24: ICD-10-CM

## 2021-07-20 DIAGNOSIS — C90.00: Primary | ICD-10-CM

## 2021-07-20 DIAGNOSIS — M48.07: ICD-10-CM

## 2021-07-20 DIAGNOSIS — M48.04: ICD-10-CM

## 2021-07-20 DIAGNOSIS — M51.27: ICD-10-CM

## 2021-07-20 PROCEDURE — 72157 MRI CHEST SPINE W/O & W/DYE: CPT

## 2021-07-20 PROCEDURE — 70470 CT HEAD/BRAIN W/O & W/DYE: CPT

## 2021-07-20 PROCEDURE — 72158 MRI LUMBAR SPINE W/O & W/DYE: CPT

## 2021-07-20 NOTE — DIAGNOSTIC IMAGING REPORT
CLINICAL INDICATION: Patient has history of multiple myeloma.

Patient is on dialysis.



EXAMS:

1: MRI of the thoracic spine performed without and with 7 mL of

Gadavist IV contrast.  Sequences include sagittal T1, sagittal

T2, sagittal T2 fat-sat, axial T1, axial T2, axial T1 post IV

contrast, and sagittal T1 fat-sat post IV contrast.



2: MRI of the lumbar spine performed without and with IV contrast

in conjunction with MRI of the thoracic spine. Sequences include

sagittal T2, sagittal T1, sagittal T2 fat-sat, axial T1, axial

T2, sagittal T1 fat-sat post IV contrast, and axial T1 fat-sat

post IV contrast.



COMPARISON: CT scan of the abdomen and pelvis without contrast

dated 04/15/2021.



FINDINGS: 

MRI of the thoracic and lumbar spine shows numerous high T2,

nearly isointense T1, avidly enhancing masses of varying sizes

throughout the visualized axial skeleton. These findings are

superimposed upon an overall low T1/low T2 signal. There is

involvement of the visualized portions of the sacrum, iliac

bones, and ribs. This finding was also noted on the comparison CT

scan. There is no expansile infiltrative process seen causing

encroachment of the central canal or neural foramina.



MRI THORACIC SPINE:

There is no acute thoracic spine fracture or dislocation. The

thoracic spine has normal cord caliber with no abnormal signal.

There are mild to moderately hypertrophic spurs involving the

thoracic spine, most pronounced in its lower portion. There is

facet arthropathy. There is small posterior disc bulge at the

T2-T3, T7-T8, and T10-T11 levels which causes no significant

central canal narrowing. There is minimal prominence to the

posterior epidural fat involving the mid thoracic spine, which

only measures 3 mm. There is mild-to-moderate multilevel neural

foramen narrowing seen from facet arthropathy. There is no

significant paraspinal soft tissue abnormality.



MRI LUMBAR SPINE:

There is no acute lumbar spine fracture or dislocation. The

visualized portions of the distal thoracic spinal cord, conus

medullaris, and cauda equina nerve roots are unremarkable. The

conus medullaris tip is seen at the upper L1 vertebral body

level. There is no significant paraspinal soft tissue

abnormality. There is a 12 mm cyst involving the right kidney.



L1-L2: There is no significant central canal or neural foramen

narrowing.



L2-L3: There is minimal disc bulging into the left foraminal

region with minimal left neural foramen narrowing. There is no

significant right neural foramen narrowing or central canal

narrowing.



L3-L4: There is mild bilateral facet arthropathy. There is

minimal bilateral neural foramen narrowing. There is no

significant central canal narrowing.



L4-L5: There is minimal diffuse disc bulging. There is a focal

annular tear posteriorly. There is mild bilateral facet

arthropathy. There is mild right neural foramen narrowing and no

significant left neural foramen narrowing. There is no

significant central canal narrowing.



L5-S1: There is a mild diffuse disc bulge and moderate loss of

disc space height and bilateral facet arthropathy. There is

mild-to-moderate bilateral neural foramen narrowing. There is no

significant central canal stenosis.



MRI OF THORACIC AND LUMBAR SPINE 

IMPRESSION:

1: There are numerous high T2, enhancing lesions throughout the

visualized axial skeleton consistent with patient's history of

multiple myeloma. There is no expansile infiltrative vertebral

body lesion which encroaches upon the central canal or neural

foramen regions.



2: There is thoracic spine and lumbar spine degenerative disease,

as described above.



Dictated by: 



  Dictated on workstation # [id number]

## 2021-07-20 NOTE — DIAGNOSTIC IMAGING REPORT
PROCEDURE: CT head with and without contrast.



TECHNIQUE: Multiple contiguous axial images were obtained through

the brain before and after the administration of intravenous

contrast. Auto Exposure Controls were utilized during the CT exam

to meet ALARA standards for radiation dose reduction. 



INDICATION:  Left periorbital and facial pain, patient has a

history of multiple myeloma.



Compared with CT head dated 03/10/2021 and correlated with the

metabolic PET CT dated 04/06/2021. 



FINDINGS:  A few punched out lytic foci in the high calvarium at

the frontoparietal lobes near, at and below the level of the

vertex unchanged. Some subtle bubbly lucencies in the temporal

bones at the sphenoid wings unchanged. Brain itself showed no

evidence for soft tissue mass, infarct, hemorrhage or edema and

after contrast was administered.  No abnormal or suspicious

enhancing foci, mastoid air cells and middle ear cavities clear. 

The paranasal sinuses are clear.  Bony and soft tissue orbital

contents appeared unremarkable. No acute appearing pathology.



IMPRESSION:  

1.  A few calvarial and skull base lucencies unchanged from

comparison head CT, nonspecific but given the history, raises the

question of intracranial involvement by known myeloma. 

2.  However the brain itself is unremarkable and there is no

evidence for intracranial metastatic disease, hemorrhage, edema,

infarct or abnormal enhancement. 

3.  The orbital contents and the paranasal sinuses themselves

were all clear and no fracture identified.



Dictated by: 



  Dictated on workstation # [id number]

## 2021-07-20 NOTE — DIAGNOSTIC IMAGING REPORT
CLINICAL INDICATION: Patient has history of multiple myeloma.

Patient is on dialysis.



EXAMS:

1: MRI of the thoracic spine performed without and with 7 mL of

Gadavist IV contrast.  Sequences include sagittal T1, sagittal

T2, sagittal T2 fat-sat, axial T1, axial T2, axial T1 post IV

contrast, and sagittal T1 fat-sat post IV contrast.



2: MRI of the lumbar spine performed without and with IV contrast

in conjunction with MRI of the thoracic spine. Sequences include

sagittal T2, sagittal T1, sagittal T2 fat-sat, axial T1, axial

T2, sagittal T1 fat-sat post IV contrast, and axial T1 fat-sat

post IV contrast.



COMPARISON: CT scan of the abdomen and pelvis without contrast

dated 04/15/2021.



FINDINGS: 

MRI of the thoracic and lumbar spine shows numerous high T2,

nearly isointense T1, avidly enhancing masses of varying sizes

throughout the visualized axial skeleton. These findings are

superimposed upon an overall low T1/low T2 signal. There is

involvement of the visualized portions of the sacrum, iliac

bones, and ribs. This finding was also noted on the comparison CT

scan. There is no expansile infiltrative process seen causing

encroachment of the central canal or neural foramina.



MRI THORACIC SPINE:

There is no acute thoracic spine fracture or dislocation. The

thoracic spine has normal cord caliber with no abnormal signal.

There are mild to moderately hypertrophic spurs involving the

thoracic spine, most pronounced in its lower portion. There is

facet arthropathy. There is small posterior disc bulge at the

T2-T3, T7-T8, and T10-T11 levels which causes no significant

central canal narrowing. There is minimal prominence to the

posterior epidural fat involving the mid thoracic spine, which

only measures 3 mm. There is mild-to-moderate multilevel neural

foramen narrowing seen from facet arthropathy. There is no

significant paraspinal soft tissue abnormality.



MRI LUMBAR SPINE:

There is no acute lumbar spine fracture or dislocation. The

visualized portions of the distal thoracic spinal cord, conus

medullaris, and cauda equina nerve roots are unremarkable. The

conus medullaris tip is seen at the upper L1 vertebral body

level. There is no significant paraspinal soft tissue

abnormality. There is a 12 mm cyst involving the right kidney.



L1-L2: There is no significant central canal or neural foramen

narrowing.



L2-L3: There is minimal disc bulging into the left foraminal

region with minimal left neural foramen narrowing. There is no

significant right neural foramen narrowing or central canal

narrowing.



L3-L4: There is mild bilateral facet arthropathy. There is

minimal bilateral neural foramen narrowing. There is no

significant central canal narrowing.



L4-L5: There is minimal diffuse disc bulging. There is a focal

annular tear posteriorly. There is mild bilateral facet

arthropathy. There is mild right neural foramen narrowing and no

significant left neural foramen narrowing. There is no

significant central canal narrowing.



L5-S1: There is a mild diffuse disc bulge and moderate loss of

disc space height and bilateral facet arthropathy. There is

mild-to-moderate bilateral neural foramen narrowing. There is no

significant central canal stenosis.



MRI OF THORACIC AND LUMBAR SPINE 

IMPRESSION:

1: There are numerous high T2, enhancing lesions throughout the

visualized axial skeleton consistent with patient's history of

multiple myeloma. There is no expansile infiltrative vertebral

body lesion which encroaches upon the central canal or neural

foramen regions.



2: There is thoracic spine and lumbar spine degenerative disease,

as described above.



Dictated by: 



  Dictated on workstation # [id number]

## 2021-07-26 LAB
ALBUMIN SERPL-MCNC: 3.6 GM/DL (ref 3.2–4.5)
ALP SERPL-CCNC: 75 U/L (ref 40–136)
ALT SERPL-CCNC: 9 U/L (ref 0–55)
BASOPHILS # BLD AUTO: 0 10^3/UL (ref 0–0.1)
BASOPHILS NFR BLD AUTO: 1 % (ref 0–10)
BILIRUB SERPL-MCNC: 0.8 MG/DL (ref 0.1–1)
BUN/CREAT SERPL: 7
CALCIUM SERPL-MCNC: 8.6 MG/DL (ref 8.5–10.1)
CHLORIDE SERPL-SCNC: 99 MMOL/L (ref 98–107)
CO2 SERPL-SCNC: 28 MMOL/L (ref 21–32)
CREAT SERPL-MCNC: 4.27 MG/DL (ref 0.6–1.3)
EOSINOPHIL # BLD AUTO: 0.3 10^3/UL (ref 0–0.3)
EOSINOPHIL NFR BLD AUTO: 5 % (ref 0–10)
GFR SERPLBLD BASED ON 1.73 SQ M-ARVRAT: 14 ML/MIN
GLUCOSE SERPL-MCNC: 95 MG/DL (ref 70–105)
HCT VFR BLD CALC: 22 % (ref 40–54)
HGB BLD-MCNC: 6.7 G/DL (ref 13.3–17.7)
LYMPHOCYTES # BLD AUTO: 0.6 X 10^3 (ref 1–4)
LYMPHOCYTES NFR BLD AUTO: 8 % (ref 12–44)
MANUAL DIFFERENTIAL PERFORMED BLD QL: NO
MCH RBC QN AUTO: 33 PG (ref 25–34)
MCHC RBC AUTO-ENTMCNC: 31 G/DL (ref 32–36)
MCV RBC AUTO: 107 FL (ref 80–99)
MONOCYTES # BLD AUTO: 0.7 X 10^3 (ref 0–1)
MONOCYTES NFR BLD AUTO: 10 % (ref 0–12)
NEUTROPHILS # BLD AUTO: 5 X 10^3 (ref 1.8–7.8)
NEUTROPHILS NFR BLD AUTO: 76 % (ref 42–75)
PLATELET # BLD: 104 10^3/UL (ref 130–400)
PMV BLD AUTO: 9.8 FL (ref 9–12.2)
POTASSIUM SERPL-SCNC: 4.4 MMOL/L (ref 3.6–5)
PROT SERPL-MCNC: 6.7 GM/DL (ref 6.4–8.2)
SODIUM SERPL-SCNC: 138 MMOL/L (ref 135–145)
WBC # BLD AUTO: 6.5 10^3/UL (ref 4.3–11)

## 2021-08-02 LAB
BASOPHILS # BLD AUTO: 0 10^3/UL (ref 0–0.1)
BASOPHILS NFR BLD AUTO: 1 % (ref 0–10)
BUN/CREAT SERPL: 6
CALCIUM SERPL-MCNC: 9.2 MG/DL (ref 8.5–10.1)
CHLORIDE SERPL-SCNC: 98 MMOL/L (ref 98–107)
CO2 SERPL-SCNC: 27 MMOL/L (ref 21–32)
CREAT SERPL-MCNC: 4.19 MG/DL (ref 0.6–1.3)
EOSINOPHIL # BLD AUTO: 0.3 10^3/UL (ref 0–0.3)
EOSINOPHIL NFR BLD AUTO: 6 % (ref 0–10)
GFR SERPLBLD BASED ON 1.73 SQ M-ARVRAT: 14 ML/MIN
GLUCOSE SERPL-MCNC: 126 MG/DL (ref 70–105)
HCT VFR BLD CALC: 27 % (ref 40–54)
HGB BLD-MCNC: 8.6 G/DL (ref 13.3–17.7)
LYMPHOCYTES # BLD AUTO: 0.6 X 10^3 (ref 1–4)
LYMPHOCYTES NFR BLD AUTO: 10 % (ref 12–44)
MANUAL DIFFERENTIAL PERFORMED BLD QL: NO
MCH RBC QN AUTO: 33 PG (ref 25–34)
MCHC RBC AUTO-ENTMCNC: 32 G/DL (ref 32–36)
MCV RBC AUTO: 104 FL (ref 80–99)
MONOCYTES # BLD AUTO: 0.7 X 10^3 (ref 0–1)
MONOCYTES NFR BLD AUTO: 12 % (ref 0–12)
NEUTROPHILS # BLD AUTO: 4 X 10^3 (ref 1.8–7.8)
NEUTROPHILS NFR BLD AUTO: 72 % (ref 42–75)
PLATELET # BLD: 89 10^3/UL (ref 130–400)
PMV BLD AUTO: 9.9 FL (ref 9–12.2)
POTASSIUM SERPL-SCNC: 4.3 MMOL/L (ref 3.6–5)
SODIUM SERPL-SCNC: 135 MMOL/L (ref 135–145)
WBC # BLD AUTO: 5.5 10^3/UL (ref 4.3–11)

## 2021-08-10 LAB
ALBUMIN SERPL-MCNC: 3.7 GM/DL (ref 3.2–4.5)
ALP SERPL-CCNC: 82 U/L (ref 40–136)
ALT SERPL-CCNC: 7 U/L (ref 0–55)
BASOPHILS # BLD AUTO: 0 10^3/UL (ref 0–0.1)
BASOPHILS NFR BLD AUTO: 0 % (ref 0–10)
BILIRUB SERPL-MCNC: 0.7 MG/DL (ref 0.1–1)
BUN/CREAT SERPL: 8
CALCIUM SERPL-MCNC: 9.3 MG/DL (ref 8.5–10.1)
CHLORIDE SERPL-SCNC: 98 MMOL/L (ref 98–107)
CO2 SERPL-SCNC: 26 MMOL/L (ref 21–32)
CREAT SERPL-MCNC: 3.73 MG/DL (ref 0.6–1.3)
EOSINOPHIL # BLD AUTO: 0.4 10^3/UL (ref 0–0.3)
EOSINOPHIL NFR BLD AUTO: 4 % (ref 0–10)
GFR SERPLBLD BASED ON 1.73 SQ M-ARVRAT: 16 ML/MIN
GLUCOSE SERPL-MCNC: 127 MG/DL (ref 70–105)
HCT VFR BLD CALC: 27 % (ref 40–54)
HGB BLD-MCNC: 8.4 G/DL (ref 13.3–17.7)
LYMPHOCYTES # BLD AUTO: 0.8 10^3/UL (ref 1–4)
LYMPHOCYTES NFR BLD AUTO: 9 % (ref 12–44)
MANUAL DIFFERENTIAL PERFORMED BLD QL: NO
MCH RBC QN AUTO: 33 PG (ref 25–34)
MCHC RBC AUTO-ENTMCNC: 31 G/DL (ref 32–36)
MCV RBC AUTO: 107 FL (ref 80–99)
MONOCYTES # BLD AUTO: 0.6 10^3/UL (ref 0–1)
MONOCYTES NFR BLD AUTO: 8 % (ref 0–12)
NEUTROPHILS # BLD AUTO: 6.5 10^3/UL (ref 1.8–7.8)
NEUTROPHILS NFR BLD AUTO: 78 % (ref 42–75)
PLATELET # BLD: 161 10^3/UL (ref 130–400)
PMV BLD AUTO: 9.8 FL (ref 9–12.2)
POTASSIUM SERPL-SCNC: 4.1 MMOL/L (ref 3.6–5)
PROT SERPL-MCNC: 6.7 GM/DL (ref 6.4–8.2)
SODIUM SERPL-SCNC: 136 MMOL/L (ref 135–145)
WBC # BLD AUTO: 8.4 10^3/UL (ref 4.3–11)

## 2021-08-12 ENCOUNTER — HOSPITAL ENCOUNTER (EMERGENCY)
Dept: HOSPITAL 75 - ER FS | Age: 66
Discharge: ANOTHER HOSPITAL | End: 2021-08-12
Payer: MEDICARE

## 2021-08-12 VITALS — HEIGHT: 73.62 IN | BODY MASS INDEX: 25.17 KG/M2 | WEIGHT: 194.01 LBS

## 2021-08-12 VITALS — SYSTOLIC BLOOD PRESSURE: 125 MMHG | DIASTOLIC BLOOD PRESSURE: 54 MMHG

## 2021-08-12 DIAGNOSIS — E03.9: ICD-10-CM

## 2021-08-12 DIAGNOSIS — D50.9: ICD-10-CM

## 2021-08-12 DIAGNOSIS — Z79.899: ICD-10-CM

## 2021-08-12 DIAGNOSIS — Z86.73: ICD-10-CM

## 2021-08-12 DIAGNOSIS — K50.90: ICD-10-CM

## 2021-08-12 DIAGNOSIS — Z79.890: ICD-10-CM

## 2021-08-12 DIAGNOSIS — Z99.2: ICD-10-CM

## 2021-08-12 DIAGNOSIS — K62.5: Primary | ICD-10-CM

## 2021-08-12 LAB
ALBUMIN SERPL-MCNC: 3.2 GM/DL (ref 3.2–4.5)
ALP SERPL-CCNC: 53 U/L (ref 40–136)
ALT SERPL-CCNC: 6 U/L (ref 0–55)
BASOPHILS # BLD AUTO: 0 10^3/UL (ref 0–0.1)
BASOPHILS NFR BLD AUTO: 0 % (ref 0–10)
BILIRUB SERPL-MCNC: 0.5 MG/DL (ref 0.1–1)
BUN/CREAT SERPL: 11
CALCIUM SERPL-MCNC: 8.5 MG/DL (ref 8.5–10.1)
CHLORIDE SERPL-SCNC: 96 MMOL/L (ref 98–107)
CO2 SERPL-SCNC: 20 MMOL/L (ref 21–32)
CREAT SERPL-MCNC: 8.15 MG/DL (ref 0.6–1.3)
EOSINOPHIL # BLD AUTO: 0.3 10^3/UL (ref 0–0.3)
EOSINOPHIL NFR BLD AUTO: 3 % (ref 0–10)
ERYTHROCYTE [SEDIMENTATION RATE] IN BLOOD: 52 MM/HR (ref 0–30)
GFR SERPLBLD BASED ON 1.73 SQ M-ARVRAT: 7 ML/MIN
GLUCOSE SERPL-MCNC: 102 MG/DL (ref 70–105)
HCT VFR BLD CALC: 15 % (ref 40–54)
HGB BLD-MCNC: 4.6 G/DL (ref 13.3–17.7)
LYMPHOCYTES # BLD AUTO: 1.6 X 10^3 (ref 1–4)
LYMPHOCYTES NFR BLD AUTO: 17 % (ref 12–44)
MANUAL DIFFERENTIAL PERFORMED BLD QL: NO
MCH RBC QN AUTO: 34 PG (ref 25–34)
MCHC RBC AUTO-ENTMCNC: 31 G/DL (ref 32–36)
MCV RBC AUTO: 110 FL (ref 80–99)
MONOCYTES # BLD AUTO: 1 X 10^3 (ref 0–1)
MONOCYTES NFR BLD AUTO: 11 % (ref 0–12)
NEUTROPHILS # BLD AUTO: 6.8 X 10^3 (ref 1.8–7.8)
NEUTROPHILS NFR BLD AUTO: 69 % (ref 42–75)
PLATELET # BLD: 174 10^3/UL (ref 130–400)
PMV BLD AUTO: 9.9 FL (ref 7.4–10.4)
POTASSIUM SERPL-SCNC: 5.5 MMOL/L (ref 3.6–5)
PROT SERPL-MCNC: 5.2 GM/DL (ref 6.4–8.2)
SODIUM SERPL-SCNC: 134 MMOL/L (ref 135–145)
WBC # BLD AUTO: 9.8 10^3/UL (ref 4.3–11)

## 2021-08-12 PROCEDURE — 99285 EMERGENCY DEPT VISIT HI MDM: CPT

## 2021-08-12 PROCEDURE — 85025 COMPLETE CBC W/AUTO DIFF WBC: CPT

## 2021-08-12 PROCEDURE — 93005 ELECTROCARDIOGRAM TRACING: CPT

## 2021-08-12 PROCEDURE — 86922 COMPATIBILITY TEST ANTIGLOB: CPT

## 2021-08-12 PROCEDURE — 86900 BLOOD TYPING SEROLOGIC ABO: CPT

## 2021-08-12 PROCEDURE — 86141 C-REACTIVE PROTEIN HS: CPT

## 2021-08-12 PROCEDURE — 86920 COMPATIBILITY TEST SPIN: CPT

## 2021-08-12 PROCEDURE — 86901 BLOOD TYPING SEROLOGIC RH(D): CPT

## 2021-08-12 PROCEDURE — 84484 ASSAY OF TROPONIN QUANT: CPT

## 2021-08-12 PROCEDURE — 36415 COLL VENOUS BLD VENIPUNCTURE: CPT

## 2021-08-12 PROCEDURE — 85652 RBC SED RATE AUTOMATED: CPT

## 2021-08-12 PROCEDURE — 86850 RBC ANTIBODY SCREEN: CPT

## 2021-08-12 PROCEDURE — 80053 COMPREHEN METABOLIC PANEL: CPT

## 2021-08-12 PROCEDURE — 93041 RHYTHM ECG TRACING: CPT

## 2021-08-12 NOTE — ED GI
General


Stated Complaint:  BLOODY STOOL





History of Present Illness


Date Seen by Provider:  Aug 12, 2021


Time Seen by Provider:  07:01


Initial Comments


66-year-old male presents with bloody stool.  Patient reports he been having 

bloody stool for about 2 days.  Patient reports he has a history of multiple 

myeloma, is dialysis dependent and has Crohn's disease.  Patient reports that 

his last hemoglobin was checked 2 days ago and it was 8.4 and has chronic anemia

due to his multiple myeloma.  Patient also he is due for dialysis today.  

Patient thought that the bloody stool with stop his leg did not come in.  He 

reports that the loose stool.  He has some mild abdominal discomfort.  He also 

reports just a little bit of chest discomfort, no shortness of breath.





Allergies and Home Medications


Allergies


Coded Allergies:  


     Penicillins (Verified  Allergy, Severe, EDEMA, 6/13/20)


     adhesive tape (Verified  Allergy, Severe, RASH, 6/13/20)


     cefazolin (Verified  Allergy, Severe, RASH, 6/13/20)


     cephalexin (Verified  Allergy, Severe, RASH, 6/13/20)


     morphine (Verified  Allergy, Severe, EDEMA, takes Hydromorphone at home, 

11/18/20)


     butabarbital (Verified  Allergy, Unknown, 6/13/20)


     butorphanol (Verified  Allergy, Unknown, 6/13/20)


     ibuprofen (Verified  Allergy, Unknown, 6/13/20)


     piperacillin (Verified  Allergy, Unknown, EDEMA, 6/13/20)


     promethazine (Verified  Allergy, Unknown, 6/13/20)


     silicone (Verified  Allergy, Unknown, 6/13/20)


     zolpidem (Verified  Allergy, Unknown, 6/13/20)


     ofloxacin (Verified  Adverse Reaction, Unknown, DIARRHEA, 6/13/20)





Home Medications


Acetaminophen 325 Mg Capsule, 650 MG PO Q8H PRN for PAIN-MILD (1-4), (Reported)


Azathioprine 50 Mg Tablet, 50 MG PO DAILY, (Reported)


Doxycycline Hyclate 100 Mg Tablet, 100 MG PO BID


   Prescribed by: HARLEEN GARDNER on 6/20/21 1619


Famotidine 20 Mg Tablet, 20 MG PO Q24H


   Prescribed by: LETI URENA on 11/20/20 1142


Hydromorphone HCl 8 Mg Tablet, 8 MG PO Q6H PRN for PAIN-SEVERE (8-10), 

(Reported)


Levothyroxine Sodium 25 Mcg Tablet, 25 MCG PO DAILY, (Reported)


Lisinopril 5 Mg Tablet, 5 MG PO DAILY, (Reported)


Methadone HCl 10 Mg Tablet, 10 MG PO Q6- 8H, (Reported)


Multivitamin 1 Each Tablet, 1 EACH PO DAILY, (Reported)


Peg 400/Hypromellose/Glycerin 15 Ml Drops, 2 DROPS OU PRN PRN for DRY EYES, (Re

ported)


Pregabalin 75 Mg Capsule, 75 MG PO TID, (Reported)


Temazepam 15 Mg Capsule, 15-30 MG PO HS PRN for SLEEP, (Reported)


Terbinafine HCl 250 Mg Tablet, 250 MG PO DAILY, (Reported)


Venlafaxine HCl 75 Mg Cap.er.24h, 75 MG PO DAILY, (Reported)


   TAKES 75MG +150MG TO EQUAL 225MG DAILY 


Venlafaxine HCl 150 Mg Cap.er.24h, 150 MG PO DAILY, (Reported)


   TAKES 75MG +150MG TO EQUAL 225MG DAILY 





Patient Home Medication List


Home Medication List Reviewed:  Yes





Review of Systems


Review of Systems


Constitutional:  No chills; fever


Respiratory:  Denies Cough, Denies SOA at Rest


Cardiovascular:  Chest Pain; Denies Irregular Heart Rate, Denies 

Lightheadedness, Denies Palpitations


Gastrointestinal:  Diarrhea; Denies Nausea; Rectal Bleeding; Denies Vomiting


Genitourinary:  No Symptoms Reported


Musculoskeletal:  no symptoms reported


Psychiatric/Neurological:  No Symptoms Reported


Hematologic/Lymphatic:  See HPI





Past Medical-Social-Family Hx


Seasonal Allergies


Seasonal Allergies:  No





Past Medical History


Surgeries:  Yes (X11 ABDOMINAL, EYE)


Appendectomy, Bowel Surgery, Eye Surgery, Gallbladder


Respiratory:  Yes (HYPOXIA DX ON O2@ 2L/M)


Currently Using CPAP:  No


Currently Using BIPAP:  No


Cardiac:  Yes (BLOOD CLOT, PERICARDITIS)


Deep Vein Thrombosis, Pericarditis


Neurological:  Yes (BRAIN STEM STROKE)


Stroke


Genitourinary:  Yes


Renal Failure, Dialysis


Gastrointestinal:  Yes


Crohns Disease, Obstructive Bowel


Musculoskeletal:  No


Endocrine:  Yes


Hypothyroidsim


HEENT:  No


Cataract


Loss of Vision:  Denies


Hearing Impairment:  Denies


Cancer:  Yes


Psychosocial:  No


Integumentary:  No


Blood Disorders:  Yes





Family Medical History


Asthma





Physical Exam


Vital Signs





Vital Signs - First Documented








 8/12/21 8/12/21





 06:50 10:00


 


Temp 36.2 


 


Pulse 107 


 


Resp 18 


 


B/P (MAP) 156/70 (98) 


 


Pulse Ox 97 


 


O2 Delivery Room Air 


 


O2 Flow Rate  3.00





Capillary Refill :


Height/Weight/BMI


Height: '"


Weight: lbs. oz. kg; 25.00 BMI


Method:


General Appearance:  no apparent distress


HEENT:  PERRL/EOMI, pale conjunctivae (R), pale conjunctivae (L)


Neck:  full range of motion, supple


Respiratory:  lungs clear, normal breath sounds


Cardiovascular:  normal peripheral pulses, regular rate, rhythm


Gastrointestinal:  soft; No distended, No guarding, No rebound


Extremities:  non-tender, normal inspection


Neurologic/Psychiatric:  alert, normal mood/affect, oriented x 3


Skin:  pallor





Progress/Results/Core Measures


Results/Orders


Lab Results





Laboratory Tests








Test


 8/12/21


07:06 Range/Units


 


 


White Blood Count


 9.8 


 4.3-11.0


10^3/uL


 


Red Blood Count


 1.34 L


 4.35-5.85


10^6/uL


 


Hemoglobin 4.6 *L 13.3-17.7  G/DL


 


Hematocrit 15 *L 40-54  %


 


Mean Corpuscular Volume 110 H 80-99  FL


 


Mean Corpuscular Hemoglobin 34  25-34  PG


 


Mean Corpuscular Hemoglobin


Concent 31 L


 32-36  G/DL





 


Red Cell Distribution Width 20.6 H 10.0-14.5  %


 


Platelet Count


 174 


 130-400


10^3/uL


 


Mean Platelet Volume 9.9  7.4-10.4  FL


 


Immature Granulocyte % (Auto) 1   %


 


Neutrophils (%) (Auto) 69  42-75  %


 


Lymphocytes (%) (Auto) 17  12-44  %


 


Monocytes (%) (Auto) 11  0-12  %


 


Eosinophils (%) (Auto) 3  0-10  %


 


Basophils (%) (Auto) 0  0-10  %


 


Neutrophils # (Auto) 6.8  1.8-7.8  X 10^3


 


Lymphocytes # (Auto) 1.6  1.0-4.0  X 10^3


 


Monocytes # (Auto) 1.0  0.0-1.0  X 10^3


 


Eosinophils # (Auto)


 0.3 


 0.0-0.3


10^3/uL


 


Basophils # (Auto)


 0.0 


 0.0-0.1


10^3/uL


 


Immature Granulocyte # (Auto)


 0.1 


 0.0-0.1


10^3/uL


 


Erythrocyte Sedimentation Rate 52 H 0-30  MM/HR


 


Sodium Level 134 L 135-145  MMOL/L


 


Potassium Level 5.5 H 3.6-5.0  MMOL/L


 


Chloride Level 96 L   MMOL/L


 


Carbon Dioxide Level 20 L 21-32  MMOL/L


 


Anion Gap 18 H 5-14  MMOL/L


 


Blood Urea Nitrogen 91 H 7-18  MG/DL


 


Creatinine


 8.15 #H


 0.60-1.30


MG/DL


 


Estimat Glomerular Filtration


Rate 7 


  





 


BUN/Creatinine Ratio 11   


 


Glucose Level 102    MG/DL


 


Calcium Level 8.5  8.5-10.1  MG/DL


 


Corrected Calcium 9.1  8.5-10.1  MG/DL


 


Total Bilirubin 0.5  0.1-1.0  MG/DL


 


Aspartate Amino Transf


(AST/SGOT) 8 


 5-34  U/L





 


Alanine Aminotransferase


(ALT/SGPT) 6 


 0-55  U/L





 


Alkaline Phosphatase 53    U/L


 


Troponin I < 0.30  <0.30  NG/ML


 


C-Reactive Protein 0.53 H <0.50  MG/DL


 


Total Protein 5.2 L 6.4-8.2  GM/DL


 


Albumin 3.2  3.2-4.5  GM/DL








My Orders





Orders - AQUINO,THERON L DO


Cbc With Automated Diff (8/12/21 07:01)


Comprehensive Metabolic Panel (8/12/21 07:01)


Erythrocyte Sedimentation Rate (8/12/21 07:01)


Crp Fs (8/12/21 07:01)


Ekg Tracing (8/12/21 07:04)


Monitor-Rhythm Ecg Trace Only (8/12/21 07:04)


Troponin I Fs (8/12/21 07:04)


Blood Products Transfusion: Re (8/12/21 07:43)


Ns Iv 500 Ml (Sodium Chloride 0.9%) (8/12/21 07:53)


Ns Iv 500 Ml (Sodium Chloride 0.9%) (8/12/21 08:30)





Vital Signs/I&O











 8/12/21 8/12/21 8/12/21





 06:50 08:30 10:00


 


Temp 36.2 36.8 36.1





  36.4 





  36.1 


 


Pulse 107 90 74


 


Resp 18  16


 


B/P (MAP) 156/70 (98)  125/54


 


Pulse Ox 97  96


 


O2 Delivery Room Air  Nasal Cannula


 


O2 Flow Rate   3.00











Progress


Progress Note :  


Progress Note


Patient with active GI bleed with a hemoglobin drop from 8.4-4.6 over the last 2

days.  Patient will be given 1 unit oh positive blood based on limited blood 

supply at this facility.  Patient was graciously accepted by [facility] 

Dr. Garcia.  Patient to be transferred by EMS in stable but guarded 

condition.





Departure


Impression





   Primary Impression:  


   Rectal bleed


   Additional Impressions:  


   Anemia


   Qualified Codes:  D50.9 - Iron deficiency anemia, unspecified


   Dependence on renal dialysis


Disposition:  02 XFER SHT-TRM HOSP


Condition:  Critical





Transfer


Transfer Reason:  Exceeds level of care


Time Spoke to Accepting Phy:  08:30


Transfer Progress Notes


pt accepted [facility]- Dr Garcia


Transfer Facility:  


[facility]


Method of Transfer:  EMS





Departure-Patient Inst.


Referrals:  


SELF,CONCHA CHRISTIAN (PCP/Family)


Primary Care Physician











THERON AQUINO DO               Aug 12, 2021 07:01

## 2021-08-12 NOTE — XMS REPORT
Clinical Summary

                             Created on: 2021



Kiran Gibson

External Reference #: [id number]

: 1955

Sex: Male



Demographics





                          Address                   [address] [place] [address]  [address]

 

                          Home Phone                [phone]

 

                          Preferred Language        English

 

                          Marital Status            

 

                          Religion Affiliation     Unknown

 

                          Race                      White

 

                          Ethnic Group              Not  or 





Author





                          Author                    [facility]

 

                          Organization              [facility]

 

                          Address                   Unknown

 

                          Phone                     Unavailable







Support





                Name            Relationship    Address         Phone

 

                    Linh Gibson       [employer]                [address] [place] [address]  [address]                   [phone]







Care Team Providers





                    Care Team Member Name Role                Phone

 

                    Found, Provider Not PCP                 Unavailable







Source Comments

[facility]



Allergies





                                        Comments



                 Active Allergy  Reactions       Severity        Noted Date 

 

                                         



                     Butorphanol Tartrate  Unknown             2011 

 

                                         



                     Cephalexin          Unknown             2011 

 

                                         



                     Dicyclomine         Unknown             2011 

 

                                         



                     Ibuprofen           Unknown             2011 

 

                                         



                     Ofloxacin           Unknown             2011 

 

                                         



                     Penicillins         Unknown             2011 

 

                                         



                     Piperacillin        Unknown             2011 

 

                                        



Allergic to the IV form only



                     Promethazine        Other (See          2011 



                                         Comments)   

 

                                         



                     Zolpidem            Unknown             2011 







Medications

*  Please verify current medications with patient. 







                          End Date                  Status



              Medication   Sig          Dispensed    Refills      Start  



                                         Date  

 

                                                    Active



                     traZODone (DESYREL) 150  Take 150 mg         0   



                           mg tablet                 by mouth at     



                                         bedtime.     

 

                                                    Active



                     temazepam (FOR RESTORIL)  Take 30 mg by       0   



                           30 mg capsule             mouth every     



                                         bedtime, as     



                                         needed. For     



                                         sleep     

 

                                                    Active



                     methadone (FOR DOLOPHINE)  Take 30 mg by       0   



                           10 mg tablet              mouth four     



                                         times a day.     



                                         Up to four     



                                         times daily     

 

                                                    Active



                     oxyCODONE (OXY-IR) 15 mg  Take 15-30 mg       0   



                           immediate release tablet  by mouth     



                                         every four     



                                         hours, as     



                                         needed. For     



                                         break through     



                                         pain     

 

                                                    Active



                     PHENobarbital-belladonna  Take 1-2            0   



                           alkaloids 16.2 mg / tab   tablets by     



                           (FOR DONNATAL)            mouth every     



                           16.2-0.1037 -0.0194 mg    six hours, as     



                           tablet                    needed. For     



                                         stomach upset     

 

                                                    Active



              HYDROmorphone (FOR  Take 1 tablet  20 tablet    0              



                     DILAUDID) 4 mg tablet  by mouth            1  



                                         every four     



                                         hours, as     



                                         needed for     



                                         Moderate     



                                         Pain.     







Active Problems





 



                           Problem                   Noted Date

 

 



                           Crohn's disease           2011

 

 



                           SBO (small bowel obstruction)  2011

 

 



                           Abdominal pain            2011







Social History





                                        Date



                 Tobacco Use     Types           Packs/Day       Years Used 

 

                                         



                                         Never Smoker    

 

    



                                         Smokeless Tobacco: Never   



                                         Used   







                                        Comments



                           Alcohol Use               Standard Drinks/Week 

 

                                         



                           No                        0 (1 standard drink = 0.6 o

z pure alcohol) 







 



                           Sex Assigned at Birth     Date Recorded

 

 



                                         Not on file 







Last Filed Vital Signs





                    Reading             Time Taken          Comments



                                         Vital Sign   

 

                    132/81              2011  7:52 AM MDT  



                                         Blood Pressure   

 

                    73                  2011  7:52 AM MDT  



                                         Pulse   

 

                    37.1 C (98.8 F) 2011  7:52 AM MDT  



                                         Temperature   

 

                    18                  2011  7:52 AM MDT  



                                         Respiratory Rate   

 

                    95%                 2011  7:52 AM MDT  



                                         Oxygen Saturation   

 

                    -                   -                    



                                         Inhaled Oxygen   



                                         Concentration   

 

                    85.6 kg (188 lb 11.4 oz) 2011  6:08 AM MDT  



                                         Weight   

 

                    188 cm (6' 2")      2011  6:08 AM MDT  



                                         Height   

 

                    24.23               2011  6:08 AM MDT  



                                         Body Mass Index   







Plan of Treatment





   



                 Health Maintenance  Due Date        Last Done       Comments

 

   



                           CT Colonography           1955  

 

   



                           Colon cancer: DNA-based   1957  



                                         stool test (Cologuard)   

 

   



                           Sigmoidoscopy             1955  

 

   



                           gFOBT or FIT              1955  

 

   



                           COVID-19 Vaccine (1)      1969  

 

   



                           Colonoscopy               2007  

 

   



                           Colorectal Cancer         2007  



                                         Screening   

 

   



                           Influenza Vaccine (#1)    2021  

 

   



                           Pneumococcal Vaccine: 65+  2022  



                                         Years (1 of 1 - PPSV23)   

 

   



                     HPV Vaccine         Aged Out            No longer eligible 

based on patient's age to



                                         complete this topic

 

   



                     Pneumococcal Vaccine:  Aged Out            No longer eligib

le based on patient's age to



                           Pediatrics (0 to 5 Years)    complete this topic



                                         and At-Risk Patients (6   



                                         to 64 Years)   







Results

Not on filefrom Last 3 Months



Insurance





                                        Type



            Payer      Benefit    Subscriber ID  Effective  Phone      Address 



                           Plan /                    Dates   



                                         Group     

 

                                        Medicare



                 MEDICARE        MEDICARE        [id number]      Effective   



                           CO PART                   for all   



                           A&B                       dates   







     



            Guarantor Name  Account    Relation to  Date of    Phone      [place]

g Address



                     Type                Patient             Birth  

 

     



            Kiran Gibson  Personal/F  Self       1955  [phone]  [address]

[address] [facility]               (Home)              [place], [place] [address]

1







Advance Directives





                          Patient Representative    Explanation



                           Type                      Date Recorded  

 

                                                     



                           Living Will               2011  2:37 AM  

 

                                                     



                           CPR Directives            2011  2:37 AM  

 

                                                     



                           Durable Medical POA       2011  2:37 AM  











                          Date Inactivated          Comments



                           Code Status               Date Activated  

 

                          2011  1:39 PM         



                           Full Code                 2011  6:04 AM
Clinical Summary

                             Created on: 2021



Kiran Gibson

External Reference #: [id number]

: 1955

Sex: Male



Demographics





                          Address                   [address] [place] [address]  [address]

 

                          Home Phone                [phone]

 

                          Preferred Language        English

 

                          Marital Status            

 

                          Sikhism Affiliation     Unknown

 

                          Race                      White

 

                          Ethnic Group              Not  or 





Author





                          Author                    [facility]

 

                          Organization              [facility]

 

                          Address                   Unknown

 

                          Phone                     Unavailable







Support





                Name            Relationship    Address         Phone

 

                    Linh Gibson       [employer]                [address] [place] [address]  [address]              [phone]

 

                Given None      [facility]            Unknown         Unavailable







Care Team Providers





                    Care Team Member Name Role                Phone

 

                    Self, Simba CHRISTIAN    PCP                 [phone]







Allergies

Not on File



Medications

Not on file



Active Problems





Not on file



Social History





                                        Date



                 Tobacco Use     Types           Packs/Day       Years Used 

 

                                         



                                         Never Assessed    







 



                           Sex Assigned at Birth     Date Recorded

 

 



                                         Not on file 







Last Filed Vital Signs

Not on file



Plan of Treatment





Not on file



Results

Not on filefrom Last 3 Months
Clinical Summary

                             Created on: 2021



Kiran Gibson Abdoulaye

External Reference #: [id number]

: 1955

Sex: Male



Demographics





                          Address                   [address] [place]  [address]

 

                          Home Phone                [phone]

 

                          Preferred Language        English

 

                          Marital Status            

 

                          Roman Catholic Affiliation     Unknown

 

                          Race                      White

 

                          Ethnic Group              Not  or 





Author





                          Author                    [facility]

 

                          Organization              [facility]

 

                          Address                   Unknown

 

                          Phone                     Unavailable







Support





                Name            Relationship    Address         Phone

 

                Linh Gibson   [facility]            Unknown         [phone]

 

                Soco Mulligan       [facility]            Unknown         [phone]







Care Team Providers





                    Care Team Member Name Role                Phone

 

                    Self, Simba CHRISTIAN    PCP                 [phone]

 

                    Greg Brown MD [phone]          [phone]

 

                    Jessica Wilson RN [phone]          Unavailable

 

                    Josie Yang RN   [phone]          Unavailable







Source Comments

Some departments are not documenting in the electronic medical record.  If you d
o not see the information that you expected, contact Release of Information in Critical access hospital Information Management department at [phone] for further assistan
ce in locating additional records.[facility]



Allergies





                                        Comments



                 Active Allergy  Reactions       Severity        Noted Date 

 

                                         



                 Cefazolin       RASH            Medium          2021 

 

                                         



                 Ibuprofen       HYPOTENSION     High            2021 

 

                                         



                 Morphine        HYPOTENSION     High            2021 

 

                                         



                 Penicillins     EDEMA           Medium          2021 

 

                                         



                 Promethazine    PALPITATIONS    Low             2021 







Medications





                          End Date                  Status



              Medication   Sig          Dispensed    Refills      Start  



                                         Date  

 

                                                    Suspended



                     promethazine (PHENERGAN)  Take 25 mg by       0   



                           25 mg tablet              mouth every 6     



                                         hours as     



                                         needed for     



                                         Nausea or     



                                         Vomiting.     

 

                                                    Suspended



                     HYDROmorphone (DILAUDID)  Take 8 mg by        0   



                           4 mg tablet               mouth daily     



                                         as needed for     



                                         Pain     

 

                                                    Suspended



                     methadone (METHADOSE) 10  Take 10 mg by       0   



                           mg tablet                 mouth every 8     



                                         hours     

 

                                                    Suspended



                     temazepam (RESTORIL) 15  Take 15 mg by       0   



                           mg capsule                mouth at     



                                         bedtime as     



                                         needed.     

 

                                                    Suspended



                     dexAMETHasone (DECADRON)  Take 40 mg by       0   



                           1 mg tab                  mouth every 7     



                                         days.     

 

                                                    Suspended



                     lisinopriL (ZESTRIL) 5 mg  Take 5 mg by        0   



                           tablet                    mouth daily.     

 

                                                    Suspended



                     folic acid 400 mcg tablet  Take 666 mcg        0   



                                         by mouth     



                                         daily.     

 

                                                    Suspended



                     MELATONIN PO        Take 3 mg by        0   



                                         mouth daily.     







Active Problems





 



                           Problem                   Noted Date

 

 



                           GI bleed                  2021

 

 



                           Severe anemia             2021

 

 



                           Chest pain                2021

 

 



                           ESRD (end stage renal disease)  2021

 

 



                           Hyperkalemia              2021

 

 



                           Chronic respiratory failure with hypoxia  2021

 

 



                           Chronic pain due to neoplasm  2021

 

 



                           Stem cell transplant candidate  2021

 

 



                           Multiple myeloma not having achieved remission  

 

 



                                         Cancer Staging: Clinical: RISS Stage II

 (Beta-2-microglobulin (mg/L): 32,



                                         Albumin (g/dL): 3.2, ISS: Stage III, Hi

gh-risk cytogenetics: Absent, LDH:



                                         Normal) - Signed by Greg Brown MD on 2021







Encounters





                          Care Team                 Description



                     Date                Type                Specialty  

 

                                        



Alexa Martin APRN-NP                Arrived



                     2021          Hospital            Radiology  



                                         Encounter   

 

                                        



Ramiro Garcia MD [facility] Jesse Nguyen MD           GI bleed



                     2021          Hospital            Intensive Care  



                                         Encounter   

 

                                                     



                           2021                Travel   

 

                                        



Diana Yang RN                     Care Coordination



                     2021          Telephone           Oncology  

 

                                        



Diana Yang RN                     Care Coordination



                     2021          Telephone           Oncology  



from Last 3 Months



Surgical History





   



                 Surgery         Date            Site/Laterality  Comments

 

   



                                         HX APPENDECTOMY   

 

   



                                         COLONOSCOPY   

 

   



                                         HX CHOLECYSTECTOMY   

 

   



                           HX BOWEL RESECTION        Bilateral 







Medical History





  



                     Medical History     Date                Comments

 

  



                                         Multiple myeloma and  



                                         immunoproliferative neoplasms (HCC)  

 

  



                                         Renal failure  

 

  



                                         Cancer of skin  

 

  



                                         Hearing reduced  

 

  



                                         Stomach disorder  







Family History





   



                 Medical History  Relation        Name            Comments

 

   



                           Asthma                    Father  

 

   



                           Cancer-Prostate           Father  

 

   



                           Stroke                    Father  

 

   



                           Cancer-Colon              Maternal  



                                         Grandfather  

 

   



                           Heart Disease             Paternal  



                                         Grandfather  







   



                 Relation        Name            Status          Comments

 

   



                           Father                    Alive 

 

   



                           Maternal Grandfather       

 

   



                           Paternal Grandfather       







Social History





                                        Date



                 Tobacco Use     Types           Packs/Day       Years Used 

 

                                         



                                         Never Smoker    

 

    



                                         Smokeless Tobacco: Never   



                                         Used   







                                        Comments



                           Alcohol Use               Standard Drinks/Week 

 

                                         



                           Never                     0 (1 standard drink = 0.6 o

z pure alcohol) 







  



                     Alcohol Habits      Answer              Date Recorded

 

  



                     How often do you have a drink containing alcohol?  Never   

            2021

 

  



                           How many drinks containing alcohol do you have on  No

t asked 



                                         a typical day when you are drinking?  

 

  



                           How often do you have six or more drinks on one  Not 

asked 



                                         occasion?  







 



                           Sex Assigned at Birth     Date Recorded

 

 



                                         Not on file 







                                        Date Recorded



                           COVID-19 Exposure         Response 

 

                                        2021 12:53 PM CDT



                           In the last month, have you been in contact with  [facility] to assess 



                                         someone who was confirmed or suspected 

to have  



                                         Coronavirus / COVID-19?  







Last Filed Vital Signs





                    Reading             Time Taken          Comments



                                         Vital Sign   

 

                    131/64              2021  4:00 PM CDT  



                                         Blood Pressure   

 

                    99                  2021  4:00 PM CDT  



                                         Pulse   

 

                    36.6 C (97.8 F) 2021  1:45 PM CDT  



                                         Temperature   

 

                    16                  2021 12:31 PM CDT  



                                         Respiratory Rate   

 

                    100%                2021  4:00 PM CDT  



                                         Oxygen Saturation   

 

                    -                   -                    



                                         Inhaled Oxygen   



                                         Concentration   

 

                    89.3 kg (196 lb 13.9 oz) 2021  1:45 PM CDT  



                                         Weight   

 

                    -                   -                    



                                         Height   

 

                    -                   -                    



                                         Body Mass Index   







Plan of Treatment





   



                 Health Maintenance  Due Date        Last Done       Comments

 

   



                           MEDICARE ANNUAL WELLNESS  1957  



                                         VISIT   

 

   



                           HIV SCREENING             1972  

 

   



                           DTAP/TDAP VACCINES (1 -   1975  



                                         Tdap)   

 

   



                           HEPATITIS C SCREENING     1975  

 

   



                           PHYSICAL (COMPREHENSIVE)  1975  



                                         EXAM   

 

   



                           COLORECTAL CANCER         2007  



                                         SCREENING   

 

   



                           SHINGLES RECOMBINANT      2007  



                                         VACCINE (1 of 2)   

 

   



                           INFLUENZA VACCINE         10/01/2021  







Procedures

* The patient is currently admitted. The information in this section might not 
  be complete until the patient is discharged.







                                        Comments



                 Procedure Name  Priority        Date/Time       Associated Diag

nosis 

 

                                        



Results for this procedure are in the results section.



                     BLOOD GASES, CENTRAL  Routine             2021  



                           VENOUS                    1:23 PM CDT  

 

                                        



Results for this procedure are in the results section.



                     BLOOD TYPE CONFIRMATION -  Routine             2021  



                           ORDER ONLY IF REQUESTED   1:15 PM CDT  



                                         BY LAB    

 

                                         



                     CONSULT IV THERAPY TEAM  Routine             2021  



                                         12:40 PM CDT  

 

                                        



Results for this procedure are in the results section.



                           POC GLUCOSE               2021  



                                         12:04 PM CDT  

 

                                        



Results for this procedure are in the results section.



                     CHEST SINGLE VIEW   Routine             2021  



                                         12:03 PM CDT  

 

                                         



                     TYPE & CROSSMATCH   STAT                2021  



                                         11:45 AM CDT  

 

                                        



Results for this procedure are in the results section.



                     C REACTIVE PROTEIN (CRP)  Add on              2021  



                                         11:45 AM CDT  

 

                                        



Results for this procedure are in the results section.



                     BETA HYDROXYBUTYRATE  STAT                2021  



                           (KETONES)                 11:45 AM CDT  

 

                                        



Results for this procedure are in the results section.



                     PROCALCITONIN       STAT                2021  



                                         11:45 AM CDT  

 

                                        



Results for this procedure are in the results section.



                     HEMOGLOBIN A1C      Routine             2021  



                                         11:45 AM CDT  

 

                                        



Results for this procedure are in the results section.



                     TSH WITH FREE T4 REFLEX  Routine             2021  



                                         11:45 AM CDT  

 

                                        



Results for this procedure are in the results section.



                     BNP (B-TYPE NATRIURETIC  Routine             2021  



                           PEPTI)                    11:45 AM CDT  

 

                                        



Results for this procedure are in the results section.



                     TROPONIN-I          STAT                2021  



                                         11:45 AM CDT  

 

                                        



Results for this procedure are in the results section.



                     PHOSPHORUS          Routine             2021  



                                         11:45 AM CDT  

 

                                        



Results for this procedure are in the results section.



                     MAGNESIUM           Routine             2021  



                                         11:45 AM CDT  

 

                                        



Results for this procedure are in the results section.



                     LACTIC ACID (BG - RAPID  Routine             2021  



                           LACTATE)                  11:45 AM CDT  

 

                                        



Results for this procedure are in the results section.



                     COMPREHENSIVE METABOLIC  Routine             2021  



                           PANEL                     11:45 AM CDT  

 

                                        



Results for this procedure are in the results section.



                     PROTIME INR (PT)    Routine             2021  



                                         11:45 AM CDT  

 

                                        



Results for this procedure are in the results section.



                     CBC AND DIFF        Routine             2021  



                                         11:45 AM CDT  



from Last 3 Months



Results

* BLOOD GASES, CENTRAL VENOUS (2021  1:23 PM CDT)



    



              Component    Value        Ref Range    Performed At  Pathologist



                                         Signature

 

    



                 PH-Central      7.27 (L)        7.30 - 7.40     [facility] 



                                         Venous    

 

    



                 PCO2-Central    44              >40 MMHG        [facility] 



                                         Venous    

 

    



                 PO2-Central     43              40 - 50 MMHG    [facility] 



                                         Venous    

 

    



                 Base            6.6             MMOL/L          [facility] 



                                         Deficit-Central    



                                         Venous    

 

    



                 O2 Sat          58.4 (L)        65 - 75 %       [facility] 



                                         (Calc)-Central    



                                         Venous    

 

    



                 Bicarb-Central  18.6            MMOL/L          [facility] 



                                         Venous    











                                         Specimen

 





                                         Blood







   



                 Performing Organization  Address         City/State/ZIP Code  P

judit Number

 

   



                      [facility]         [address] [place]

 [address] 





* BLOOD TYPE CONFIRMATION - ORDER ONLY IF REQUESTED BY LAB (2021  1:15 PM 
  CDT)



    



              Component    Value        Ref Range    Performed At  Pathologist



                                         Signature

 

    



                     ABO/RH(D)           B POS               [facility] 











                                         Specimen

 





                                         Blood







   



                 Performing Organization  Address         City/State/ZIP Code  P

judit Number

 

   



                     [facility]         [address] [place]

 [address] 





* POC GLUCOSE (2021 12:04 PM CDT)



    



              Component    Value        Ref Range    Performed At  Pathologist



                                         Signature

 

    



                 Glucose, POC    116 (H)         70 - 100 MG/DL  [facility] 











                                         Specimen

 









   



                 Performing Organization  Address         City/[facility]/ZIP Code  P

judit Number

 

   



                     [facility]         [address] [place]

 [address] 





* CHEST SINGLE VIEW (2021 12:03 PM CDT)







                                         Specimen

 









 



                           Impressions               Performed At

 

 



                           1. Mild pulmonary edema with small left and trace rig

ht pleural effusions.  [facility] 

[facility] RES[facility]



                                         Associated mild bibasilar atelectasis. 



                                         By my electronic signature, I attest th

at I have personally reviewed the images





                                         for this examination and formulated the

 interpretations and opinions expressed 



                                         in this report 



                                         Finalized by You Lind MD on 

021 3:02 PM. Dictated by Fawad Castano MD on 2021 2:29 PM. 







 



                           Narrative                 Performed At

 

 



                           CHEST SINGLE VIEW         [facility]



                                         INDICATION: chest pain 



                                         COMPARISON STUDY: 2021. 



                                         FINDINGS: 



                                         Support Devices: Left internal jugular 

central venous catheter projects over 

the 



                                         upper SVC. Right internal jugular chest

 port projects over the right chest wall





                                         with catheter tip projecting over the l

ower SVC. 



                                         Lungs/Pleura: Lung volume is normal. Di

ffuse interstitial prominence throughout





                                         the lungs . Small left and trace right 

pleural effusion with mild bibasilar 



                                         atelectasis. 



                                         Heart and Mediastinum: The cardiomedias

tinal silhouette is unchanged. 



                                         Skeletal and soft tissue: Old rib fract

ures. Lytic lesion in the anterior right





                                         sixth rib may represent patient's known

 myeloma. 







                                        Procedure Note

 

                                        



Interface, Radiant Results - 2021  3:05 PM CDT



Formatting of this note might be different from the original.

CHEST SINGLE VIEW 



INDICATION: chest pain



COMPARISON STUDY: 2021.



FINDINGS:



Support Devices: Left internal jugular central venous catheter projects over the
upper SVC. Right internal jugular chest port projects over the right chest wall 
with catheter tip projecting over the lower SVC.



Lungs/Pleura: Lung volume is normal. Diffuse interstitial prominence throughout 
the lungs . Small left and trace right pleural effusion with mild bibasilar 
atelectasis. 



Heart and Mediastinum: The cardiomediastinal silhouette is unchanged. 



Skeletal and soft tissue: Old rib fractures. Lytic lesion in the anterior right 
sixth rib may represent patient's known myeloma.



IMPRESSION



                                        1. Mild pulmonary edema with small left 

and trace right pleural effusions. 

Associated mild bibasilar atelectasis.



By my electronic signature, I attest that I have personally reviewed the images 
for this examination and formulated the interpretations and opinions expressed 
in this report





 Finalized by You Lind MD on 2021 3:02 PM. Dictated by Fawad Castano MD on 2021 2:29 PM.









   



                 Performing Organization  Address         City/[facility]/ZIP Code  P

judit Number

 

   



                                         [facility]   





* PROCALCITONIN (2021 11:45 AM CDT)



    



              Component    Value        Ref Range    Performed At  Pathologist



                                         Signature

 

    



                 Procalcitonin   1.51            ng/mL            [facility] 



                                         Comment:   



                                         Suspected Lower Respiratory   



                                         Tract Infection:   



                                         >0.25 ng/mL-Increased   



                                         likeihood bacterial infection   



                                         Suspected Sepsis:   



                                         >0.5 ng/mL-Increased   



                                         likelihood sepsis   



                                         >2.0 ng/mL-High risk of   



                                         sepsis/septic shock   











                                         Specimen

 





                                         Blood







   



                 Performing Organization  Address         City/[facility]/ZIP Code  P

judit Number

 

   



                     [facility]         [address] [place]

 [address] 





* TSH WITH FREE T4 REFLEX (2021 11:45 AM CDT)



    



              Component    Value        Ref Range    Performed At  Pathologist



                                         Signature

 

    



                 TSH             4.09            0.35 - 5.00 MCU/ML  [facility]

 











                                         Specimen

 





                                         Blood







   



                 Performing Organization  Address         City/[facility]/ZIP Code  P

judit Number

 

   



                     [facility]         [address] [place]

 [address] 





* BETA HYDROXYBUTYRATE (KETONES) (2021 11:45 AM CDT)



    



              Component    Value        Ref Range    Performed At  Pathologist



                                         Signature

 

    



                 Beta            0.2             <0.3 MMOL/L     [facility] 



                           Hydroxybutyrate           Comment:   



                                         Beta hydroxybutyrate (BOHB) is   



                                         the most abundant ketone   



                                         (78%), followed by   



                                         acetoacetate (20%) and acetone   



                                         (2%). Measurement BOHB is   



                                         recommended to   



                                         assess ketones in DKA.   



                                         Expected BOHB Results for DKA:   



                                         Initial presentation    



                                         high/increasing   



                                         During treatment      



                                         decreasing   



                                         Resolved          



                                         decreasing/normal   











                                         Specimen

 









   



                 Performing Organization  Address         City/[facility]/ZIP Code  P

judit Number

 

   



                     [facility]         [address] [place]

 [address] 





* LACTIC ACID (BG - RAPID LACTATE) (2021 11:45 AM CDT)



    



              Component    Value        Ref Range    Performed At  Pathologist



                                         [facility]

 

    



                 Lactic Acid,BG  1.6             0.5 - 2.0 MMOL/L  [facility] 











                                         Specimen

 





                                         Blood







   



                 Performing Organization  Address         City/[facility]/ZIP Code  P

judit Number

 

   



                     [facility]         [address] [place] [address] 





* TROPONIN-I (2021 11:45 AM CDT)



    



              Component    Value        Ref Range    Performed At  Pathologist



                                         [facility]

 

    



                 Troponin-I      0.02            0.0 - 0.05 NG/ML  [facility] 











                                         Specimen

 





                                         Blood







   



                 Performing Organization  Address         City/[facility]/ZIP Code  P

judit Number

 

   



                     [facility]         [address] [place] [address] 





* PROTIME INR (PT) (2021 11:45 AM CDT)



    



              Component    Value        Ref Range    Performed At  Pathologist



                                         [facility]

 

    



                 INR             0.9             0.8 - 1.2        [facility] 











                                         Specimen

 





                                         Blood







   



                 Performing Organization  Address         City/[facility]/ZIP Code  P

judit Number

 

   



                     [facility]         [address] [place] [address] 





* CBC AND DIFF (2021 11:45 AM CDT)



    



              Component    Value        Ref Range    Performed At  Pathologist



                                         [facility]

 

    



                 White Blood     10.5            4.5 - 11.0 K/UL   [facility] 



                                         Cells    

 

    



                 RBC             1.74 (L)        4.4 - 5.5 M/UL   [facility] 

 

    



                 Hemoglobin      5.6 (LL)        13.5 - 16.5 GM/DL   [facility] 



                                         Comment:   



                                         CRITICAL VALUE CALLED TO AND   



                                         READ BACK BY/TIME/TECH   



                                         ROSALBA MARTIN at 2021   



                                         14:19:49 by 1135   

 

    



                 Hematocrit      17.2 (L)        40 - 50 %        [facility] 

 

    



                 MCV             98.6            80 - 100 FL      [facility] 

 

    



                 MCH             32.3            26 - 34 PG       [facility] 

 

    



                 MCHC            32.7            32.0 - 36.0 G/DL   [facility] 

 

    



                 RDW             23.0 (H)        11 - 15 %       [facility] 

 

    



                 Platelet Count  148 (L)         150 - 400 K/UL   [facility] 

 

    



                 MPV             7.6             7 - 11 FL        [facility] 

 

    



                 Neutrophils     91 (H)          41 - 77 %        [facility] 

 

    



                 Lymphocytes     5 (L)           24 - 44 %        [facility] 

 

    



                 Monocytes       4               4 - 12 %         [facility] 

 

    



                 Eosinophils     0               0 - 5 %          [facility] 

 

    



                 Basophils       0               0 - 2 %          [facility] 

 

    



                 Absolute        9.58 (H)        1.8 - 7.0 K/UL  [facility] 



                                         Neutrophil    



                                         Count    

 

    



                 Absolute Lymph  0.51 (L)        1.0 - 4.8 K/UL  [facility] 



                                         Count    

 

    



                 Absolute        0.37            0 - 0.80 K/UL   [facility] 



                                         Monocyte Count    

 

    



                 Absolute        0.02            0 - 0.45 K/UL   [facility] 



                                         Eosinophil    



                                         Count    

 

    



                 Absolute        0.02            0 - 0.20 K/UL   [facility] 



                                         Basophil Count    











                                         Specimen

 





                                         Blood







   



                 Performing Organization  Address         City/[facility] Code  P

judit Number

 

   



                     [facility]         [address] [place]

 [address] 





* C REACTIVE PROTEIN (CRP) (2021 11:45 AM CDT)



    



              Component    Value        Ref Range    Performed At  Pathologist



                                         Signature

 

    



                 C-Reactive      0.50            <1.0 MG/DL      [facility] 



                                         Protein    











                                         Specimen

 









   



                 Performing Organization  Address         City/[facility]/ZIP Code  P

judit Number

 

   



                      [facility]         [address] [id number] 





* PHOSPHORUS (2021 11:45 AM CDT)



    



              Component    Value        Ref Range    Performed At  Pathologist



                                         Signature

 

    



                 Phosphorus      9.1 (H)         2.0 - 4.5 MG/DL  [facility] 











                                         Specimen

 





                                         Blood







   



                 Performing Organization  Address         City/State/ZIP Code  P

judit Number

 

   



                     [facility]         [address] [place]

 [address] 





* BNP (B-TYPE NATRIURETIC PEPTI) (2021 11:45 AM CDT)



    



              Component    Value        Ref Range    Performed At  Pathologist



                                         Signature

 

    



                 B Type          227.0 (H)       0 - 100 PG/ML   [facility] 



                                         Natriuretic    



                                         Peptide    











                                         Specimen

 





                                         Blood







   



                 Performing Organization  Address         City/State/ZIP Code  P

judit Number

 

   



                      [facility]         [address] [place]

 [address] 





* MAGNESIUM (2021 11:45 AM CDT)



    



              Component    Value        Ref Range    Performed At  Pathologist



                                         Signature

 

    



                 Magnesium       1.7             1.6 - 2.6 mg/dL  [facility] 











                                         Specimen

 





                                         Blood







   



                 Performing Organization  Address         City/[facility]/ZIP Code  P

judit Number

 

   



                     [facility]         [address] [place]

 [address] 





* HEMOGLOBIN A1C (2021 11:45 AM CDT)



    



              Component    Value        Ref Range    Performed At  Pathologist



                                         Signature

 

    



                 Hemoglobin A1C  5.0             4.0 - 6.0 %     [facility] 



                                         Comment:   



                                         The ADA recommends that most   



                                         patients with type 1 and type   



                                         2 diabetes maintain   



                                         an A1c level <7%.   











                                         Specimen

 





                                         Blood







   



                 Performing Organization  Address         City/[facility] Code  P

judit Number

 

   



                     [facility]         [address] [place]

 [address] 





* COMPREHENSIVE METABOLIC PANEL (2021 11:45 AM CDT)



    



              Component    Value        Ref Range    Performed At  Pathologist



                                         Signature

 

    



                 Sodium          137             137 - 147 MMOL/L  [facility] 

 

    



                 Potassium       7.0 (HH)        3.5 - 5.1 MMOL/L  [facility] 



                                         Comment:   



                                         CRITICAL VALUE CALLED TO AND   



                                         READ BACK BY/TIME/TECH   



                                         ROSALBA JOHNSON at 2021   



                                         13:08:03 by 1152   

 

    



                 Chloride        102             98 - 110 MMOL/L  [facility] 

 

    



                 Glucose         103 (H)         70 - 100 MG/DL  [facility] 

 

    



                 Blood Urea      99 (H)          7 - 25 MG/DL    [facility] 



                                         Nitrogen    

 

    



                 Creatinine      8.54 (H)        0.4 - 1.24 MG/DL  [facility] 

 

    



                 Calcium         8.3 (L)         8.5 - 10.6 MG/DL  [facility] 

 

    



                 Total Protein   5.3 (L)         6.0 - 8.0 G/DL  [facility] 

 

    



                 Total Bilirubin  0.3             0.3 - 1.2 MG/DL  [facility] 

 

    



                 Albumin         3.2 (L)         3.5 - 5.0 G/DL  [facility] 

 

    



                 Alk Phosphatase  45              25 - 110 U/L    [facility] 

 

    



                 AST (SGOT)      9               7 - 40 U/L      [facility] 

 

    



                 CO2             19 (L)          21 - 30 MMOL/L  [facility] 

 

    



                 ALT (SGPT)      6 (L)           7 - 56 U/L      [facility] 

 

    



                 Anion Gap       16 (H)          3 - 12          [facility] 

 

    



                 eGFR Non        6 (L)           >60 mL/min      [facility] 



                                              Comment:   



                           American                  The eGFR is not validated f

or   



                                         use in drug dosing   



                                         adjustments. Continue to use   



                                         estimated creatinine   



                                         clearance per dosing reference   



                                         text. Please contact the   



                                         Clinical Pharmacist for   



                                         questions.   

 

    



                 eGFR     8 (L)           >60 mL/min      [facility] 



                           American                  Comment:   



                                         The eGFR is not validated for   



                                         use in drug dosing   



                                         adjustments. Continue to use   



                                         estimated creatinine   



                                         clearance per dosing reference   



                                         text. Please contact the   



                                         Clinical Pharmacist for   



                                         questions.   











                                         Specimen

 





                                         Blood







   



                 Performing Organization  Address         City/State/ZIP Code  P

judit Number

 

   



                     [facility]         [address] [place]

 [address] 





from Last 3 Months



Additional Health Concerns





                          Last Indicated            Resolved Time



                           Infection                 Onset Date  

 

                          2021                 



                           C difficile Rule-Out      2021  







Insurance





                                        Type



            Payer      Benefit    Subscriber ID  Effective  Phone      Address 



                           Plan /                    Dates   



                                         Group     

 

                                        Medicare



                 MEDICARE        MEDICARE        [id number]     [id number]   



                           PART A AND                resent   



                                         B     







     



            Guarantor Name  Account    Relation to  Date of    Phone      Camila varner Address



                     Type                Patient             Birth  

 

     



            Kiran Gibson  Personal/F  Self       1955  [phone]

1  [address]               (Home)              [place], [place] [address]

1







Advance Directives





                          Patient Representative    Explanation



                           Type                      Date Recorded  

 

                                                     



                                         Advance   



                                         Directive/DPOA   











                          Date Inactivated          Comments



                           Code Status               Date Activated  

 

                                                     



                           DNAR-Full                 2021  3:11 PM  



                                         Intervention   







  



                           Provider has discussed Code Status  Yes 



                                         w/Patient or Family?  

 

  



                           Does the patient want any intervention  Yes 



                                         for a pre-arrest emergency which would 

 



                                         necessitate transfer to an ICU setting?

  

 

  



                           Respiratory emergency:  does the patient  No 



                                         want to have intubation with mechanical

  



                                         ventilation?  

 

  



                           Symptomatic/hypotensive dysrhythmia with  Yes 



                                         a pulse: does the patient want  



                                         cardioversion?  

 

  



                           Hypotension: does the patient want the  Yes 



                                         use of vasopressors if needed for blood

  



                                         pressure?  

 

  



                           Respiratory emergency: does the patient  Yes 



                                         want to have a trial of non-invasive  



                                         positive pressure ventilation  



                                         (NIPPV/BiPAP)?  







                                                     

 

                          2021  3:11 PM         



                           DNAR-Full                 2021 12:16 PM  



                                         Intervention   







  



                           Provider has discussed Code Status  Yes 



                                         w/Patient or Family?  

 

  



                           Does the patient want any intervention  Yes 



                                         for a pre-arrest emergency which would 

 



                                         necessitate transfer to an ICU setting?

  

 

  



                           Respiratory emergency:  does the patient  No 



                                         want to have intubation with mechanical

  



                                         ventilation?  

 

  



                           Symptomatic/hypotensive dysrhythmia with  Yes 



                                         a pulse: does the patient want  



                                         cardioversion?  

 

  



                           Hypotension: does the patient want the  Yes 



                                         use of vasopressors if needed for blood

  



                                         pressure?  

 

  



                           Respiratory emergency: does the patient  Yes 



                                         want to have a trial of non-invasive  



                                         positive pressure ventilation  



                                         (NIPPV/BiPAP)?
Encounter Summary

                             Created on: 2021



Kiran Gibson

External Reference #: [id number]

: 1955

Sex: Male



Demographics





                          Address                   [address] [place]  [address]

 

                          Home Phone                [phone]

 

                          Preferred Language        English

 

                          Marital Status            

 

                          Oriental orthodox Affiliation     Unknown

 

                          Race                      White

 

                          Ethnic Group              Not  or 





Author





                          Author                    [facility]

 

                          Organization              [facility]

 

                          Address                   Unknown

 

                          Phone                     Unavailable







Support





                Name            Relationship    Address         Phone

 

                Linh Gibson   ECON            Unknown         [phone]

 

                Soco Mulligan       EC[employer]            Unknown         [phone]







Care Team Providers





                    Care Team Member Name Role                Phone

 

                    Self, Simba CHRISTIAN    PCP                 [phone]

 

                    Greg Brown MD [id number]          [phone]

 

                    Jessica Wilson RN [phone]          Unavailable

 

                    Josie Yang RN   [phone]          Unavailable







Reason for Visit

* 



  



                     Reason              Onset Date          Comments

 

  



                           Care Coordination         2021 









Encounter Details





                          Care Team                 Description



                     Date                Type                Department  

 

                                        



Diana Yang RN                     Care Coordination



                     2021          Telephone           [facility]  



                                         Transplant: [facility] [address]  



                                         [address]  



                                         [address] [phone]  







Social History





                                        Date



                 Tobacco Use     Types           Packs/Day       Years Used 

 

                                         



                                         Never Smoker    

 

    



                                         Smokeless Tobacco: Never   



                                         Used   







                                        Comments



                           Alcohol Use               Standard Drinks/Week 

 

                                         



                           Never                     0 (1 standard drink = 0.6 o

z pure alcohol) 







  



                     Alcohol Habits      Answer              Date Recorded

 

  



                     How often do you have a drink containing alcohol?  Never   

            2021

 

  



                           How many drinks containing alcohol do you have on  No

t asked 



                                         a typical day when you are drinking?  

 

  



                           How often do you have six or more drinks on one  Not 

asked 



                                         occasion?  







 



                           Sex Assigned at Birth     Date Recorded

 

 



                                         Not on file 



documented as of this encounter



Miscellaneous Notes

* Telephone Encounter - Diana Menendez RN - 2021  9:54 AM CDT



Formatting of this note might be different from the original.

[facility] then called patient
's wife, she confirms patient is not interested in BMT. This PTC offered her dir
ect phone number if patient or wife ever need anything in the future. Linh verba
lized understanding. 



Josie Yang, RN

Pager [phone]





Electronically signed by Diana Menendez RN at 2021  9:55 AM CDT

documented in this encounter



Plan of Treatment





Not on filedocumented as of this encounter



Visit Diagnoses

Not on filedocumented in this encounter



Additional Health Concerns





 



                           Assessment                Noted Time

 

 



                           A fall risk assessment has been completed for the pat

ient  2021 12:31 PM 

CDT



documented as of this encounter
Encounter Summary

                             Created on: 2021



Kiran Gibson

External Reference #: [id number]

: 1955

Sex: Male



Demographics





                          Address                   [address] [place]  [address]

 

                          Home Phone                [phone]

 

                          Preferred Language        English

 

                          Marital Status            

 

                          Druze Affiliation     Unknown

 

                          Race                      White

 

                          Ethnic Group              Not  or 





Author





                          Author                    [facility]

 

                          Organization              [facility]

 

                          Address                   Unknown

 

                          Phone                     Unavailable







Support





                Name            Relationship    Address         Phone

 

                Linh Gibson   EC[employer]            Unknown         [phone]

 

                Soco Mulligan       [employer]            Unknown         [phone]







Care Team Providers





                    Care Team Member Name Role                Phone

 

                    Self, Simba CHRISTIAN    PCP                 [phone]

 

                    Greg Brown MD [phone]          [phone]

 

                    Jessica Wilson RN [phone]          Unavailable

 

                    Josie Yang RN   [phone]          Unavailable







Reason for Visit

* Auth/Cert



                          Referred By Contact       Referred To Contact



                 Status          Reason          Specialty       Diagnoses /  



                                         Procedures  

 

                                        



                                        







                                         Diagnoses  



                                         GI bleed  



                                         GI Bleed

  











Encounter Details





                          Care Team                 Description



                     Date                Type                Department  

 

                                        



Ramiro Garcia MD



 [address]



[facility] [address] [place] [address] [id number] [phone] (Fax)





Jesse Bolton MD



 [address]



[facility] [address] [place] [address] [id number] [phone] (Fax)                      GI bleed



                     2021          Hospital            Patient Care Unit B

[facility]:  



                           Encounter                 [facility]  



                                         [address]  



                                         [place], [place]  



                                         [address] [phone]  







Social History





                                        Date



                 Tobacco Use     Types           Packs/Day       Years Used 

 

                                         



                                         Never Smoker    

 

    



                                         Smokeless Tobacco: Never   



                                         Used   







                                        Comments



                           Alcohol Use               Standard Drinks/Week 

 

                                         



                           Never                     0 (1 standard drink = 0.6 o

z pure alcohol) 







  



                     Alcohol Habits      Answer              Date Recorded

 

  



                     How often do you have a drink containing alcohol?  Never   

            2021

 

  



                           How many drinks containing alcohol do you have on  No

t asked 



                                         a typical day when you are drinking?  

 

  



                           How often do you have six or more drinks on one  Not 

asked 



                                         occasion?  







 



                           Sex Assigned at Birth     Date Recorded

 

 



                                         Not on file 







                                        Date Recorded



                           COVID-19 Exposure         Response 

 

                                        2021 12:53 PM CDT



                           In the last month, have you been in contact with  Natasha

ble to assess 



                                         someone who was confirmed or suspected 

to have  



                                         Coronavirus / COVID-19?  



documented as of this encounter



Last Filed Vital Signs





                    Reading             Time Taken          Comments



                                         Vital Sign   

 

                    131/64              2021  4:00 PM CDT  



                                         Blood Pressure   

 

                    99                  2021  4:00 PM CDT  



                                         Pulse   

 

                    36.6 C (97.8 F) 2021  1:45 PM CDT  



                                         Temperature   

 

                    -                   -                    



                                         Respiratory Rate   

 

                    100%                2021  4:00 PM CDT  



                                         Oxygen Saturation   

 

                    -                   -                    



                                         Inhaled Oxygen   



                                         Concentration   

 

                    89.3 kg (196 lb 13.9 oz) 2021  1:45 PM CDT  



                                         Weight   

 

                    -                   -                    



                                         Height   

 

                    -                   -                    



                                         Body Mass Index   



documented in this encounter



Progress Notes

* Jesse Bolton MD - 2021  3:22 PM CDT



Formatting of this note is different from the original.

MICU STAFF NOTE 



This note is a documentation of critical care independently provided for the pat
ient today.



I have seen, personally fully evaluated, and assessed the patient.



The patient is critically ill with the conditions listed below: 



Active Hospital Problems 

 Diagnosis  

 GI bleed  

 Severe anemia  

 Chest pain  

 ESRD (end stage renal disease) (HCC)  

 Hyperkalemia  

 Chronic respiratory failure with hypoxia (HCC)  

 Chronic pain due to neoplasm  

 Multiple myeloma not having achieved remission (HCC)  



I spent 85 minutes (excluding time spent performing or supervising any procedure
s and independent of the time spent by the NP) providing and personally directin
g critical care services including: 

Systems and physical examination 

Review and management of ICU prophylaxis and core measures 

Review of laboratory data 

Review of telemetry data 

Review of imaging studies 

Review of medications 

Fluid and electrolyte management 



The patient is a 66-year-old male with history of multiple myeloma requiring wee
kly to every other week transfusions, end-stage renal disease on hemodialysis, c
hronic hypoxemic respiratory failure on 2 L of oxygen, chronic pain on methadone
who is being admitted to our medical ICU with a GI bleed, severe anemia and sev
ere hyperkalemia.  The patient states GI bleed started around 3 days ago.  He pr
esented this morning to an outside ER with chest pressure.  He was found to have
a hemoglobin of 4.5.  1 unit of packed red blood cells was given and the patient
was transferred to our facility.



On my exam: The patient is very pale, alert and oriented, heart with regular rat
e and rhythm, decreased breathing sounds bilaterally, abdomen soft nontender, lo
wer extremity without edema.



Labs hemoglobin 5.6, platelets 147, white blood cell count 10.5, INR 0.9, potass
ium 7, bicarb 19, anion gap 16, .



Assessment and plan: The patient is being admitted with GI bleed, severe anemia,
severe hyperkalemia in the setting of chronic kidney disease on hemodialysis.  
We have contacted nephrology for an emergent dialysis.  The patient got calcium 
gluconate on admission.  We have also contacted GI. Start PPI BID (althouht most
likely it is a lower GI bleed).  We are going to transfuse with a goal of hemog
lobin more than 7.  We are going to obtain an EKG and trend troponins.  We are a
lso going to obtain a 2D echo.  Otherwise continue supportive care.



Jesse Brower MD 



Electronically signed by Jesse Bolton MD at 2021  3:34 PM CD
T

documented in this encounter



Miscellaneous Notes

* Advanced Care Planning/Resuscitation Status - Alexa Martin APRN-NP - 
  2021 12:54 PM CDT



Formatting of this note might be different from the original.

Advance Care Planning/Resuscitation Status Conversation



Individuals present for advance care planning conversation: advanced practice pr
ovider, nurse and patient



Pertinent details of conversation (including direct quotes from patient or surro
gate): 



Patient would NOT like CPR or intubation. Agrees to vasopressors, cardioversion,
and NIPPV. If he were not able to make decisions for self, states his wife Linh 
is his DPOA.



Outcome of conversation: DNAR - Full Intervention



Documents completed as a result of this conversation: None



Other documents present, which outline patient/surrogate wishes: None



Attestation? N/A



Electronically signed by Alexa Martin APRN-NP at 2021 12:55 PM CDT

documented in this encounter



Plan of Treatment





                                        Date/Time



                 Name            Type            Priority        Associated Diag

noses 

 

                                        2021 11:45 AM CDT



                     TYPE & CROSSMATCH   Blood Bank          STAT  

 

                                        2021 11:51 AM CDT



                     POC MICU US CARDIAC  Imaging             Routine  



                                         LIMITED    

 

                                        2021  3:40 PM CDT



                     TEG WITH KAOLIN     Blood Bank          Specimen  



                                         in Lab  

 

                                        2021  3:40 PM CDT



                     COVID-19 (SARS-COV-2) PCR  Microbiology        Routine  







                                        Order Schedule



                 Name            Type            Priority        Associated Diag

noses 

 

                                        Used for early AM Blood Draw(0400) for 5

 Days starting 2021 until 

2021



                     CBC AND DIFF        Lab                 Routine  

 

                                        Used for early AM Blood Draw(0400) for 5

 Days starting 2021 until 

2021



                     COMPREHENSIVE METABOLIC  Lab                 Routine  



                                         PANEL    

 

                                        Used for early AM Blood Draw(0400) until

 discontinued starting 2021



                     MAGNESIUM  CELLULAR  Lab                 Routine  



                                         THERAPEUTICS    

 

                                        Used for early AM Blood Draw(0400) until

 discontinued starting 2021



                     PHOSPHORUS  CELLULAR  Lab                 Routine  



                                         THERAPEUTICS    

 

                                        Q4HR for 3 Occurrences starting 20

21 until 2021, 1 completed



                     TROPONIN-I          Lab                 STAT  

 

                                        ONE TIME for 1 Occurrences starting  until 2021



                     ECG 12-LEAD         ECG                 STAT  

 

                                        ONE TIME for 1 Occurrences starting  until 2021



                     POC MICU US CARDIAC  Imaging             Routine  



                                         LIMITED    

 

                                        Q6HR for 5 Days starting 2021 unti

l 2021



                     CBC                 Lab                 STAT  

 

                                        ONE TIME for 1 Occurrences starting  until 2021



                     HEMODIALYSIS INPATIENT  Dialysis            Routine  

 

                                        ONE TIME for 1 Occurrences starting  until 2021



                     TEG WITH KAOLIN     Blood Bank          Specimen  



                                         in Lab  

 

                                        ONE TIME for 1 Occurrences starting  until 2021



                     COVID-19 (SARS-COV-2) PCR  Microbiology        Routine  

 

                                        ONE TIME for 1 Occurrences starting  until 2021



                     2D + DOPPLER ECHO   ECHO                Routine  

 

                                        Transfusion for 1 Occurrences starting 0

2021



                     TRANSFUSE RBC'S     Blood Bank          Routine  

 

                                        ONE TIME for 1 Occurrences starting  until 2021



                     C DIFFICILE BY PCR  Microbiology        Routine  

 

                                        ONE TIME for 1 Occurrences starting  until 2021



                     CULTURE-FECES       Microbiology        Routine  



                                         W/SENSITIVITY    

 

                                        Draw Next Run for 1 Occurrences starting

 2021 until 2021



                     BASIC METABOLIC PANEL  Lab                 STAT  



documented as of this encounter



Procedures

* The patient is currently admitted. The information in this section might not 
  be complete until the patient is discharged.







                                        Comments



                 Procedure Name  Priority        Date/Time       Associated Diag

nosis 

 

                                        



Results for this procedure are in the results section.



                     BLOOD GASES, CENTRAL  Routine             2021  



                           VENOUS                    1:23 PM CDT  

 

                                        



Results for this procedure are in the results section.



                     BLOOD TYPE CONFIRMATION -  Routine             2021  



                           ORDER ONLY IF REQUESTED   1:15 PM CDT  



                                         BY LAB    

 

                                         



                     CONSULT IV THERAPY TEAM  Routine             2021  



                                         12:40 PM CDT  

 

                                        



Results for this procedure are in the results section.



                           POC GLUCOSE               2021  



                                         12:04 PM CDT  

 

                                        



Results for this procedure are in the results section.



                     CHEST SINGLE VIEW   Routine             2021  



                                         12:03 PM CDT  

 

                                        



Results for this procedure are in the results section.



                     PROCALCITONIN       STAT                2021  



                                         11:45 AM CDT  

 

                                        



Results for this procedure are in the results section.



                     TSH WITH FREE T4 REFLEX  Routine             2021  



                                         11:45 AM CDT  

 

                                        



Results for this procedure are in the results section.



                     BETA HYDROXYBUTYRATE  STAT                2021  



                           (KETONES)                 11:45 AM CDT  

 

                                        



Results for this procedure are in the results section.



                     LACTIC ACID (BG - RAPID  Routine             2021  



                           LACTATE)                  11:45 AM CDT  

 

                                        



Results for this procedure are in the results section.



                     TROPONIN-I          STAT                2021  



                                         11:45 AM CDT  

 

                                        



Results for this procedure are in the results section.



                     PROTIME INR (PT)    Routine             2021  



                                         11:45 AM CDT  

 

                                        



Results for this procedure are in the results section.



                     CBC AND DIFF        Routine             2021  



                                         11:45 AM CDT  

 

                                         



                     TYPE & CROSSMATCH   STAT                2021  



                                         11:45 AM CDT  

 

                                        



Results for this procedure are in the results section.



                     C REACTIVE PROTEIN (CRP)  Add on              2021  



                                         11:45 AM CDT  

 

                                        



Results for this procedure are in the results section.



                     PHOSPHORUS          Routine             2021  



                                         11:45 AM CDT  

 

                                        



Results for this procedure are in the results section.



                     BNP (B-TYPE NATRIURETIC  Routine             2021  



                           PEPTI)                    11:45 AM CDT  

 

                                        



Results for this procedure are in the results section.



                     MAGNESIUM           Routine             2021  



                                         11:45 AM CDT  

 

                                        



Results for this procedure are in the results section.



                     HEMOGLOBIN A1C      Routine             2021  



                                         11:45 AM CDT  

 

                                        



Results for this procedure are in the results section.



                     COMPREHENSIVE METABOLIC  Routine             2021  



                           PANEL                     11:45 AM CDT  



documented in this encounter



Results

* BLOOD GASES, CENTRAL VENOUS (2021  1:23 PM CDT)



    



              Component    Value        Ref Range    Performed At  Pathologist



                                         Signature

 

    



                 PH-Central      7.27 (L)        7.30 - 7.40     [facility] 



                                         Venous    

 

    



                 PCO2-Central    44              >40 MMHG        [facility] 



                                         Venous    

 

    



                 PO2-Central     43              40 - 50 MMHG    [facility] 



                                         Venous    

 

    



                 Base            6.6             MMOL/L           [facility] 



                                         Deficit-Central    



                                         Venous    

 

    



                 O2 Sat          58.4 (L)        65 - 75 %       [facility] 



                                         (Calc)-Central    



                                         Venous    

 

    



                 Bicarb-Central  18.6            MMOL/L           [facility] 



                                         Venous    











                                         Specimen

 





                                         Blood







   



                 Performing Organization  Address         City/[facility] Code  P

judit Number

 

   



                      [facility]         [address] [place] [address] 





* BLOOD TYPE CONFIRMATION - ORDER ONLY IF REQUESTED BY LAB (2021  1:15 PM 
  CDT)



    



              Component    Value        Ref Range    Performed At  Pathologist



                                         Signature

 

    



                     ABO/RH(D)           B POS                [facility] 











                                         Specimen

 





                                         Blood







   



                 Performing Organization  Address         City/[facility]/ZIP Code  P

judit Number

 

   



                      [facility]         [address] [place] [address] 





* POC GLUCOSE (2021 12:04 PM CDT)



    



              Component    Value        Ref Range    Performed At  Pathologist



                                         Signature

 

    



                 Glucose, POC    116 (H)         70 - 100 MG/DL   [facility] 











                                         Specimen

 









   



                 Performing Organization  Address         City/[facility]/ZIP Code  P

judit Number

 

   



                      [facility]         [address] [place] [address] 





* CHEST SINGLE VIEW (2021 12:03 PM CDT)







                                         Specimen

 









 



                           Impressions               Performed At

 

 



                           1. Mild pulmonary edema with small left and trace rig

ht pleural effusions.  [facility] 

[facility] RES[facility]



                                         Associated mild bibasilar atelectasis. 



                                         By my electronic signature, I attest th

at I have personally reviewed the images





                                         for this examination and formulated the

 interpretations and opinions expressed 



                                         in this report 



                                         Finalized by You Lind MD on 

021 3:02 PM. Dictated by Fawad Castano MD on 2021 2:29 PM. 







 



                           Narrative                 Performed At

 

 



                           CHEST SINGLE VIEW         [facility]



                                         INDICATION: chest pain 



                                         COMPARISON STUDY: 2021. 



                                         FINDINGS: 



                                         Support Devices: Left internal jugular 

central venous catheter projects over 

the 



                                         upper SVC. Right internal jugular chest

 port projects over the right chest wall





                                         with catheter tip projecting over the l

ower SVC. 



                                         Lungs/Pleura: Lung volume is normal. Di

ffuse interstitial prominence throughout





                                         the lungs . Small left and trace right 

pleural effusion with mild bibasilar 



                                         atelectasis. 



                                         Heart and Mediastinum: The cardiomedias

tinal silhouette is unchanged. 



                                         Skeletal and soft tissue: Old rib fract

ures. Lytic lesion in the anterior right





                                         sixth rib may represent patient's known

 myeloma. 







                                        Procedure Note

 

                                        



Interface, Radiant Results - 2021  3:05 PM CDT



Formatting of this note might be different from the original.

CHEST SINGLE VIEW 



INDICATION: chest pain



COMPARISON STUDY: 2021.



FINDINGS:



Support Devices: Left internal jugular central venous catheter projects over the
upper SVC. Right internal jugular chest port projects over the right chest wall 
with catheter tip projecting over the lower SVC.



Lungs/Pleura: Lung volume is normal. Diffuse interstitial prominence throughout 
the lungs . Small left and trace right pleural effusion with mild bibasilar 
atelectasis. 



Heart and Mediastinum: The cardiomediastinal silhouette is unchanged. 



Skeletal and soft tissue: Old rib fractures. Lytic lesion in the anterior right 
sixth rib may represent patient's known myeloma.



IMPRESSION



                                        1. Mild pulmonary edema with small left 

and trace right pleural effusions. 

Associated mild bibasilar atelectasis.



By my electronic signature, I attest that I have personally reviewed the images 
for this examination and formulated the interpretations and opinions expressed 
in this report





 Finalized by You Lind MD on 2021 3:02 PM. Dictated by Fawad Castano MD on 2021 2:29 PM.









   



                 Performing Organization  Address         City/State/ZIP Code  P

judit Number

 

   



                                         [facility]   





* C REACTIVE PROTEIN (CRP) (2021 11:45 AM CDT)



    



              Component    Value        Ref Range    Performed At  Pathologist



                                         Signature

 

    



                 C-Reactive      0.50            <1.0 MG/DL      [facility] 



                                         Protein    











                                         Specimen

 









   



                 Performing Organization  Address         City/State/ZIP Code  P

judit Number

 

   



                     [facility]         [address] [place]

 [address] 





* BETA HYDROXYBUTYRATE (KETONES) (2021 11:45 AM CDT)



    



              Component    Value        Ref Range    Performed At  Pathologist



                                         Signature

 

    



                 Beta            0.2             <0.3 MMOL/L     [facility] 



                           Hydroxybutyrate           Comment:   



                                         Beta hydroxybutyrate (BOHB) is   



                                         the most abundant ketone   



                                         (78%), followed by   



                                         acetoacetate (20%) and acetone   



                                         (2%). Measurement BOHB is   



                                         recommended to   



                                         assess ketones in DKA.   



                                         Expected BOHB Results for DKA:   



                                         Initial presentation    



                                         high/increasing   



                                         During treatment      



                                         decreasing   



                                         Resolved          



                                         decreasing/normal   











                                         Specimen

 









   



                 Performing Organization  Address         City/[facility]/ZIP Code  P

judit Number

 

   



                     [facility]         [address] [place] [address] 





* PROCALCITONIN (2021 11:45 AM CDT)



    



              Component    Value        Ref Range    Performed At  Pathologist



                                         Signature

 

    



                 Procalcitonin   1.51            ng/mL           [facility] 



                                         Comment:   



                                         Suspected Lower Respiratory   



                                         Tract Infection:   



                                         >0.25 ng/mL-Increased   



                                         likeihood bacterial infection   



                                         Suspected Sepsis:   



                                         >0.5 ng/mL-Increased   



                                         likelihood sepsis   



                                         >2.0 ng/mL-High risk of   



                                         sepsis/septic shock   











                                         Specimen

 





                                         Blood







   



                 Performing Organization  Address         City/State/ZIP Code  P

judit Number

 

   



                      [facility]         [address] [place], [place] [id number] 





* HEMOGLOBIN A1C (2021 11:45 AM CDT)



    



              Component    Value        Ref Range    Performed At  Pathologist



                                         Signature

 

    



                 Hemoglobin A1C  5.0             4.0 - 6.0 %     [facility] 



                                         Comment:   



                                         The ADA recommends that most   



                                         patients with type 1 and type   



                                         2 diabetes maintain   



                                         an A1c level <7%.   











                                         Specimen

 





                                         Blood







   



                 Performing Organization  Address         City/State/ZIP Code  P

judit Number

 

   



                      [facility]         [address] [id number] 





* TSH WITH FREE T4 REFLEX (2021 11:45 AM CDT)



    



              Component    Value        Ref Range    Performed At  Pathologist



                                         Signature

 

    



                 TSH             4.09            0.35 - 5.00 MCU/ML   [facility]

 











                                         Specimen

 





                                         Blood







   



                 Performing Organization  Address         City/[facility]/ZIP Code  P

judit Number

 

   



                      [facility]         [address] [id number] 





* BNP (B-TYPE NATRIURETIC PEPTI) (2021 11:45 AM CDT)



    



              Component    Value        Ref Range    Performed At  Pathologist



                                         Signature

 

    



                 B Type          227.0 (H)       0 - 100 PG/ML   [facility] 



                                         Natriuretic    



                                         Peptide    











                                         Specimen

 





                                         Blood







   



                 Performing Organization  Address         City/[facility]/ZIP Code  P

judit Number

 

   



                     [facility]         [address] [place]

 [address] 





* TROPONIN-I (2021 11:45 AM CDT)



    



              Component    Value        Ref Range    Performed At  Pathologist



                                         Signature

 

    



                 Troponin-I      0.02            0.0 - 0.05 NG/ML  [facility] 











                                         Specimen

 





                                         Blood







   



                 Performing Organization  Address         City/[facility] Code  P

judit Number

 

   



                     [facility]         [address] [place] [address] 





* PHOSPHORUS (2021 11:45 AM CDT)



    



              Component    Value        Ref Range    Performed At  Pathologist



                                         Signature

 

    



                 Phosphorus      9.1 (H)         2.0 - 4.5 MG/DL  [facility] 











                                         Specimen

 





                                         Blood







   



                 Performing Organization  Address         City/[facility]/ZIP Code  P

judit Number

 

   



                     [facility]         [address] [id number] 





* MAGNESIUM (2021 11:45 AM CDT)



    



              Component    Value        Ref Range    Performed At  Pathologist



                                         Signature

 

    



                 Magnesium       1.7             1.6 - 2.6 mg/dL  [facility] 











                                         Specimen

 





                                         Blood







   



                 Performing Organization  Address         City/[facility]/ZIP Code  P

judit Number

 

   



                     [facility]         [address] [place] [address] 





* LACTIC ACID (BG - RAPID LACTATE) (2021 11:45 AM CDT)



    



              Component    Value        Ref Range    Performed At  Pathologist



                                         Signature

 

    



                 Lactic Acid,BG  1.6             0.5 - 2.0 MMOL/L  [facility] 











                                         Specimen

 





                                         Blood







   



                 Performing Organization  Address         City/[facility]/ZIP Code  P

judit Number

 

   



                     [facility]         [address] [place] [address] 





* COMPREHENSIVE METABOLIC PANEL (2021 11:45 AM CDT)



    



              Component    Value        Ref Range    Performed At  Pathologist



                                         Signature

 

    



                 Sodium          137             137 - 147 MMOL/L  [facility] 

 

    



                 Potassium       7.0 (HH)        3.5 - 5.1 MMOL/L  [facility] 



                                         Comment:   



                                         CRITICAL VALUE CALLED TO AND   



                                         READ BACK BY/TIME/TECH   



                                         ROSALBA JOHNSON at 2021   



                                         13:08:03 by 1152   

 

    



                 Chloride        102             98 - 110 MMOL/L  [facility] 

 

    



                 Glucose         103 (H)         70 - 100 MG/DL  [facility] 

 

    



                 Blood Urea      99 (H)          7 - 25 MG/DL    [facility] 



                                         Nitrogen    

 

    



                 Creatinine      8.54 (H)        0.4 - 1.24 MG/DL  [facility] 

 

    



                 Calcium         8.3 (L)         8.5 - 10.6 MG/DL  [facility] 

 

    



                 Total Protein   5.3 (L)         6.0 - 8.0 G/DL  [facility] 

 

    



                 Total Bilirubin  0.3             0.3 - 1.2 MG/DL  [facility] 

 

    



                 Albumin         3.2 (L)         3.5 - 5.0 G/DL  [facility] 

 

    



                 Alk Phosphatase  45              25 - 110 U/L    [facility] 

 

    



                 AST (SGOT)      9               7 - 40 U/L      [facility] 

 

    



                 CO2             19 (L)          21 - 30 MMOL/L  [facility] 

 

    



                 ALT (SGPT)      6 (L)           7 - 56 U/L      [facility] 

 

    



                 Anion Gap       16 (H)          3 - 12           [facility] 

 

    



                 eGFR Non        6 (L)           >60 mL/min      [facility] 



                                              Comment:   



                           [employer]                  The eGFR is not validated f

or   



                                         use in drug dosing   



                                         adjustments. Continue to use   



                                         estimated creatinine   



                                         clearance per dosing reference   



                                         text. Please contact the   



                                         Clinical Pharmacist for   



                                         questions.   

 

    



                 eGFR     8 (L)           >60 mL/min      [facility] 



                           [place]                  Comment:   



                                         The eGFR is not validated for   



                                         use in drug dosing   



                                         adjustments. Continue to use   



                                         estimated creatinine   



                                         clearance per dosing reference   



                                         text. Please contact the   



                                         Clinical Pharmacist for   



                                         questions.   











                                         Specimen

 





                                         Blood







   



                 Performing Organization  Address         City/State/ZIP Code  P

judit Number

 

   



                      [facility]         [address] [place] [address] 





* PROTIME INR (PT) (2021 11:45 AM CDT)



    



              Component    Value        Ref Range    Performed At  Pathologist



                                         Signature

 

    



                 INR             0.9             0.8 - 1.2       [facility] 











                                         Specimen

 





                                         Blood







   



                 Performing Organization  Address         City/State/ZIP Code  P

judit Number

 

   



                     [facility]         [address] [place] [address] 





* CBC AND DIFF (2021 11:45 AM CDT)



    



              Component    Value        Ref Range    Performed At  Pathologist



                                         Signature

 

    



                 White Blood     10.5            4.5 - 11.0 K/UL  [facility] 



                                         Cells    

 

    



                 RBC             1.74 (L)        4.4 - 5.5 M/UL   [facility] 

 

    



                 Hemoglobin      5.6 (LL)        13.5 - 16.5 GM/DL   [facility] 



                                         Comment:   



                                         CRITICAL VALUE CALLED TO AND   



                                         READ BACK BY/TIME/TECH   



                                         ROSALBA MARTIN at 2021   



                                         14:19:49 by 1135   

 

    



                 Hematocrit      17.2 (L)        40 - 50 %       [facility] 

 

    



                 MCV             98.6            80 - 100 FL      [facility] 

 

    



                 MCH             32.3            26 - 34 PG       [facility] 

 

    



                 MCHC            32.7            32.0 - 36.0 G/DL   [facility] 

 

    



                 RDW             23.0 (H)        11 - 15 %       [facility] 

 

    



                 Platelet Count  148 (L)         150 - 400 K/UL  [facility] 

 

    



                 MPV             7.6             7 - 11 FL       [facility] 

 

    



                 Neutrophils     91 (H)          41 - 77 %       [facility] 

 

    



                 Lymphocytes     5 (L)           24 - 44 %       [facility] 

 

    



                 Monocytes       4               4 - 12 %        [facility] 

 

    



                 Eosinophils     0               0 - 5 %         [facility] 

 

    



                 Basophils       0               0 - 2 %         [facility] 

 

    



                 Absolute        9.58 (H)        1.8 - 7.0 K/UL  [facility] 



                                         Neutrophil    



                                         Count    

 

    



                 Absolute Lymph  0.51 (L)        1.0 - 4.8 K/UL  [facility] 



                                         Count    

 

    



                 Absolute        0.37            0 - 0.80 K/UL   [facility] 



                                         Monocyte Count    

 

    



                 Absolute        0.02            0 - 0.45 K/UL   [facility] 



                                         Eosinophil    



                                         Count    

 

    



                 Absolute        0.02            0 - 0.20 K/UL   [facility] 



                                         Basophil Count    











                                         Specimen

 





                                         Blood







   



                 Performing Organization  Address         City/State/ZIP Code  P

judit Number

 

   



                     [facility]         [address] [place]

 [address] 





documented in this encounter



Visit Diagnoses









                                         Diagnosis

 





                                         GI bleed - Primary



                                         Hemorrhage of gastrointestinal tract, u

nspecified

 





                                         Severe anemia

 





                                         Chest pain



                                         Chest pain, unspecified

 





                                         Multiple myeloma not having achieved re

mission (HCC)



                                         Multiple myeloma, without mention of ha

ving achieved remission

 





                                         ESRD (end stage renal disease) ([facility])



                                         End stage renal disease

 





                                         Hyperkalemia



                                         Hyperpotassemia

 





                                         Chronic respiratory failure with hypoxi

a (HCC)



                                         Chronic respiratory failure

 





                                         Chronic pain due to neoplasm



documented in this encounter



Admitting Diagnoses









                                         Diagnosis

 





                                         GI bleed



                                         Hemorrhage of gastrointestinal tract, u

nspecified



documented in this encounter



Administered Medications





                Action Date     Dose            Rate            Site



                           Medication Order          MAR Action    

 

  



                                         pantoprazole (PROTONIX) injection 40 mg

 



                                         40 mg, Intravenous, TWICE DAILY, First 



                                         dose on 21 at 2100, Until 



                                         Discontinued 

 

  

 

  



                                         perflutren lipid microspheres (DEFINITY

) 



                                         injection 1-20 Diluted mL 



                                         1-20 Diluted mL, Intravenous, ONCE PRN,

 



                                         1 dose, Starting on 21 at 1538

, 



                                         Until 21 at 2359, For 



                                         Procedure, A sonographer may only 



                                         administer Definity through a saline 



                                         lock. If IV is in use or a port, PICC, 



                                         or central line is being used a nurse 



                                         must administer. NOTE: This is a HIGH 



                                         ALERT Medication., [facility] Procedure Area 



                                         Only - Medications 

 

  

 

  



                                         sodium chloride 0.9 %   infusion 



                                         2,000 mL, 2,000 mL, Intravenous, at 999

 



                                         mL/hr, PRN IN IP DIALYSIS, 1 dose, 



                                         Starting on Thu 21 at 1413, Until 



                                         21 at 0613, for dialysis, Use 



                                         to flush dialysis circuit during 



                                         anticoagulation. Infuse 100-200 mL at 



                                         30-60 minute intervals, clamping 



                                         arterial tubing during infusion. May ru

n 



                                         in at dialysis machine's set Blood Flow

 



                                         Rate. Only Dialysis RN can release and 



                                         administer, Dialysis/Apheresis Procedur

e 

 

  

 

  



                                         sodium chloride 0.9 %   infusion 



                                         250 mL, 200 mL, Intravenous, at 999 



                                         mL/hr, PRN IN IP DIALYSIS, 1 dose, 



                                         Starting on Thu 21 at 1413, Until 



                                         21 at 0613, Other..., 



                                         hypotension secondary to dialysis, For 



                                         bolus use only. May give bolus times 5 



                                         for symptoms of hypotension (i.e.: 



                                         lightheadedness, cramping, diaphoresis,

 



                                         nausea, changes in LOC), contact 



                                         provider if symptoms persist.   May run

 



                                         in at dialysis machine's set Blood Flow

 



                                         Rate. Second Line therapy when ordered 



                                         with Albumin. Only Dialysis RN can 



                                         release and administer, 



                                         Dialysis/Apheresis Procedure 

 

  

 

 







                Action Date     Dose            Rate            Site



                           Medication Order          MAR Action    

 

                2021  2:15 PM CDT 3.8 mL                           



                           anticoagulant sodium citrate solution  Given    



                                         1-6 mL     



                                         1-6 mL, Intra-catheter, PRN IN IP     



                                         DIALYSIS, 1 dose, Starting on Thu     



                                         21 at 1413, Until 21 at  

   



                                         1415, Other..., For use to pack vascula

r     



                                         access, Administer volume appropriate t

o     



                                         catheter lumen size. Only Dialysis RN  

   



                                         can release and administer. NOTE: This 

    



                                         is a HIGH ALERT Medication.,     



                                         Dialysis/Apheresis Procedure     

 

  

 

                2021  1:59 PM CDT 1 g             660 mL/hr        



                           calcium gluconate 1 g in sodium chloride  Given - New

    



                           0.9% (NS) 110 mL IVPB (MB+)  Bag    



                                         1 g, Intravenous, 110 mL, Administer   

  



                                         over 10 Minutes, ONCE, 1 dose, On Thu  

   



                                         21 at 1445, Infuse each 1gm over 1

0     



                                         minutes -- Please adjust duration     



                                         accordingly.     

 

  

 

                2021  2:14 PM  mL          999 mL/hr        



                           sodium chloride 0.9 %   infusion  Given - New    



                           1,000 mL, 300 mL, Intravenous, at 999  Bag    



                                         mL/hr, PRN IN IP DIALYSIS, 1 dose,     



                                         Starting on Thu 21 at 1413, Until 

    



                                         Thu 21 at 1414, Other..., Prime   

  



                                         Rinse, For Prime/Rinseback May run in a

t     



                                         dialysis machine's set Blood Flow Rate.

     



                                         Only Dialysis RN can release and     



                                         administer., Dialysis/Apheresis     



                                         Procedure     

 

  



documented in this encounter



Active and Recently Administered Medications

Times are shown in CDT.



                          2021



                           Medication Order          08/10/2021  

 

                                        1359 (Given - New Bag - Provider: Mary Fair, ROSALBA)





                                         calcium gluconate 1 g in sodium chlorid

e   



                                         0.9% (NS) 110 mL IVPB (MB+) (COMPLETED)

   



                                         1 g, Intravenous, 110 mL, Administer   



                                         over 10 Minutes, ONCE, 1 dose, On Thu  

 



                                         21 at 1445, Infuse each 1gm over 1

0   



                                         minutes -- Please adjust duration   



                                         accordingly.   

 

                                        1730 (Due)





                                         methadone (METHADOSE) tablet 10 mg   



                                         10 mg, Oral, EVERY  8 HOURS, First dose

   



                                         on Thu 21 at 1730, Until   



                                         Discontinued   

 

                                        2100 (Due)





                                         pantoprazole (PROTONIX) injection 40 mg

   



                                         40 mg, Intravenous, TWICE DAILY, First 

  



                                         dose on Thu 21 at 2100, Until   



                                         Discontinued   







                          2021



                           Medication Order          08/10/2021  

 

                                        1415 (Given - Provider: Precious Zhang RN

)





                                         anticoagulant sodium citrate solution  

 



                                         1-6 mL (COMPLETED)   



                                         1-6 mL, Intra-catheter, PRN IN IP   



                                         DIALYSIS, 1 dose, Starting on Thu   



                                         21 at 1413, Until u 21 at  

 



                                         1415, Other..., For use to pack vascula

r   



                                         access, Administer volume appropriate t

o   



                                         catheter lumen size. Only Dialysis RN  

 



                                         can release and administer. NOTE: This 

  



                                         is a HIGH ALERT Medication.,   



                                         Dialysis/Apheresis Procedure   

 

                                        1542 (Due)





                                         perflutren lipid microspheres (DEFINITY

)   



                                         injection 1-20 Diluted mL   



                                         1-20 Diluted mL, Intravenous, ONCE PRN,

   



                                         1 dose, Starting on Thu 21 at 1538

,   



                                         Until Thu 21 at 2359, For   



                                         Procedure, A sonographer may only   



                                         administer Definity through a saline   



                                         lock. If IV is in use or a port, PICC, 

  



                                         or central line is being used a nurse  

 



                                         must administer. NOTE: This is a HIGH  

 



                                         ALERT Medication., [facility] Procedure Area  

 



                                         Only - Medications   

 

     



                                         sodium chloride 0.9 %   infusion   



                                         2,000 mL, 2,000 mL, Intravenous, at 999

   



                                         mL/hr, PRN IN IP DIALYSIS, 1 dose,   



                                         Starting on Thu 21 at 1413, Until 

  



                                         21 at 0613, for dialysis, Use 

  



                                         to flush dialysis circuit during   



                                         anticoagulation. Infuse 100-200 mL at  

 



                                         30-60 minute intervals, clamping   



                                         arterial tubing during infusion. May ru

n   



                                         in at dialysis machine's set Blood Flow

   



                                         Rate. Only Dialysis RN can release and 

  



                                         administer, Dialysis/Apheresis Procedur

e   

 

                                        1414 (Given - New Bag - Provider: Precious Zhang RN)





                                         sodium chloride 0.9 %   infusion   



                                         (COMPLETED)   



                                         1,000 mL, 300 mL, Intravenous, at 999  

 



                                         mL/hr, PRN IN IP DIALYSIS, 1 dose,   



                                         Starting on Thu 21 at 1413, Until 

  



                                         Thu 21 at 1414, Other..., Prime   



                                         Rinse, For Prime/Rinseback May run in a

t   



                                         dialysis machine's set Blood Flow Rate.

   



                                         Only Dialysis RN can release and   



                                         administer., Dialysis/Apheresis   



                                         Procedure   

 

     



                                         sodium chloride 0.9 %   infusion   



                                         250 mL, 200 mL, Intravenous, at 999   



                                         mL/hr, PRN IN IP DIALYSIS, 1 dose,   



                                         Starting on Thu 21 at 1413, Until 

  



                                         21 at 0613, Other...,   



                                         hypotension secondary to dialysis, For 

  



                                         bolus use only. May give bolus times 5 

  



                                         for symptoms of hypotension (i.e.:   



                                         lightheadedness, cramping, diaphoresis,

   



                                         nausea, changes in LOC), contact   



                                         provider if symptoms persist.   May run

   



                                         in at dialysis machine's set Blood Flow

   



                                         Rate. Second Line therapy when ordered 

  



                                         with Albumin. Only Dialysis RN can   



                                         release and administer,   



                                         Dialysis/Apheresis Procedure   



documented in this encounter



Orders





                                        First Ordered Date



                     Medications Ordered That Might Not Have  Count             

  Last Ordered Date 



                                         Been Administered   

 

                                         



                     methadone (METHADOSE) tablet 10 mg  1                   2021 

 

                                         



                     pantoprazole (PROTONIX) injection 40 mg  3                 

  2021 

 

                                         



                     perflutren lipid microspheres (DEFINITY)  1                

   2021 



                                         injection 1-20 Diluted mL   

 

                                         



                     sodium chloride 0.9 %   infusion  2                    







                                        First Ordered Date



                     Lab Orders Without Results  Count               Last Ordere

d Date 

 

                                         



                     PREPARE RBC'S       1                   2021 

 

                                         



                     TRANSFUSE RBC'S     1                   2021 







                                        First Ordered Date



                     Procedures          Count               Last Ordered Date 

 

                                         



                     CONSULT IV THERAPY TEAM  1                   2021 







                                        First Ordered Date



                     Diet                Count               Last Ordered Date 

 

                                         



                     DIET CLEAR LIQUID   1                   2021 

 

                                         



                     DIET NPO AT MIDNIGHT  1                   2021 







                                        First Ordered Date



                     Nursing             Count               Last Ordered Date 

 

                                         



                     IMPLEMENT ADULT ACTIVE SURVEILLANCE  1                    



                                         PROTOCOL   

 

                                         



                     IMPLEMENT ADULT AND PEDIATRIC: SEASONAL  1                 

  2021 



                                         INFLUENZA AND PNEUMOCOCCAL VACCINE   



                                         SCREENING   

 

                                         



                     IMPLEMENT C DIFFICILE TESTING PROTOCOL  1                  

 2021 

 

                                         



                     IMPLEMENT CONFIRMATION BLOOD TYPE (CBT)  1                 

  2021 



                                         PROTOCOL   

 

                                         



                     IMPLEMENT COVID-19 VACCINE PROTOCOL  1                    

 

                                         



                     IMPLEMENT EMERGENT SITUATIONS, CARE OF  1                  

 2021 



                                         THE ADULT PATIENT PROTOCOL   

 

                                         



                     IMPLEMENT GENERAL IV LINE FLUSH PROTOCOL  1                

   2021 

 

                                         



                     IMPLEMENT HEPATITIS B INFECTION CONTROL  1                 

  2021 



                                         FOR HEMODIALYSIS PROTOCOL   

 

                                         



                     IMPLEMENT HYPOGLYCEMIA MANAGEMENT OF  1                   0

2021 



                                         ADULT PATIENTS PROTOCOL   

 

                                         



                     IMPLEMENT IMPAIRED SKIN INTEGRITY WOUND  1                 

  2021 



                                         CARE AND MAINTENANCE   

 

                                         



                     IMPLEMENT MANAGEMENT OF THE ADULT AND  1                   

2021 



                                         ADOLESCENT PATIENT WITH KNOWN OR   



                                         SUSPECTED OBSTRUCTIVE SLEEP APNEA (BUD)

   



                                         PROTOCOL   

 

                                         



                     IMPLEMENT SLEEP PROMOTION IN ADULT  1                   2021 



                                         INPATIENT UNITS PROTOCOL (EXCLUDING   



                                         MATERNAL CHILD)   

 

                                         



                     IMPLEMENT STANDARD BED AND PRESSURE  1                    



                                         RELIEF SURFACE SELECTION PROTOCOL   

 

                                         



                     IMPLEMENT SUICIDE PREVENTION PROTOCOL  1                   

2021 

 

                                         



                     IMPLEMENT SUSPECTED TRANSFUSION REACTION  2                

   2021 



                                         PROTOCOL   

 

                                         



                     NEURO CHECKS        4                   2021 

 

                                         



                     NOTIFY PHYSICIAN    1                   2021 

 

                                         



                     NOTIFY PHYSICIAN IF:  1                   2021 

 

                                         



                     NURSE COMMUNICATION  1                   2021 

 

                                         



                     VITAL SIGNS FOR TRANSFUSION  1                   2021

 

 

                                         



                     WEIGH PATIENT       1                   2021 







                                        First Ordered Date



                     Code Status         Count               Last Ordered Date 

 

                                         



                     DNAR-FI (DO NOT ATTEMPT  1                   2021 



                                         RESUSCITATION-FULL INTERVENTION)   







                                        First Ordered Date



                     Consult             Count               Last Ordered Date 

 

                                         



                     CONSULT GASTROENTEROLOGY PHYSICIAN  1                   2021 

 

                                         



                     CONSULT NEPHROLOGY PHYSICIAN  1                    







                                        First Ordered Date



                     Isolation           Count               Last Ordered Date 

 

                                         



                     DROPLET WITH EYE PROTECTION ISOLATION  1                   

2021 







                                        First Ordered Date



                     OT                  Count               Last Ordered Date 

 

                                         



                     OT CONSULT OCCUPATIONAL THERAPY  1                   2021 







                                        First Ordered Date



                     PT                  Count               Last Ordered Date 

 

                                         



                     PT CONSULT PHYSICAL THERAPY  1                   2021

 







                                        First Ordered Date



                     Admission           Count               Last Ordered Date 

 

                                         



                     ADMIT TO INPATIENT (NO BED REQUEST)  1                    







                                        First Ordered Date



                     Vital Signs         Count               Last Ordered Date 

 

                                         



                     VITAL SIGNS         1                   2021 







                                        First Ordered Date



                     Activity            Count               Last Ordered Date 

 

                                         



                     MOBILITY            1                   2021 







                                        First Ordered Date



                     Order Set Communication  Count               Last Ordered D

ate 

 

                                         



                     VTE DRUG PROPHYLAXIS CONTRAINDICATED  1                   0

2021 







                                        First Ordered Date



                     Intake & Output     Count               Last Ordered Date 

 

                                         



                     INTAKE AND OUTPUT   1                   2021 







                                        First Ordered Date



                     Place & Maintain    Count               Last Ordered Date 

 

                                         



                     PLACE AND MAINTAIN SCD  1                   2021 



documented in this encounter



Additional Health Concerns





                          Last Indicated            Resolved Time



                           Infection                 Onset Date  

 

                          2021                 



                           C difficile Rule-Out      2021  







 



                           Assessment                Noted Time

 

 



                           A fall risk assessment has been completed for the pat

ient  2021  1:45 PM 

CDT



documented as of this encounter
Encounter Summary

                             Created on: 2021



Kiran Gibson

External Reference #: [id number]

: 1955

Sex: Male



Demographics





                          Address                   [address] [place]  [address]

 

                          Home Phone                [phone]

 

                          Preferred Language        English

 

                          Marital Status            

 

                          Mormonism Affiliation     Unknown

 

                          Race                      White

 

                          Ethnic Group              Not  or 





Author





                          Author                    [facility]

 

                          Organization              [facility]

 

                          Address                   Unknown

 

                          Phone                     Unavailable







Support





                Name            Relationship    Address         Phone

 

                Linh Gibson   ECON            Unknown         [phone]

 

                Soco Mulligan       ECON            Unknown         [phone]







Care Team Providers





                    Care Team Member Name Role                Phone

 

                    Self, Simba CHRISTIAN    PCP                 [phone]

 

                    Greg Brown MD [id number]          [phone]

 

                    Jessica Wilson RN [phone]          Unavailable

 

                    Josie Yang RN   [phone]          Unavailable







Encounter Details





                          Care Team                 Description



                     Date                Type                Department  

 

                                        



Alexa Martin, APRN-NP



[address] [place] [address] [phone] (Fax)                      Arrived



                     2021          Hospital            Imaging: [facility]

s,  



                           Encounter                 [facility]  



                                         [address] [facility].  



                                         [address]  



                                         [place]  



                                         [address] [phone]  







Social History





                                        Date



                 Tobacco Use     Types           Packs/Day       Years Used 

 

                                         



                                         Never Smoker    

 

    



                                         Smokeless Tobacco: Never   



                                         Used   







                                        Comments



                           Alcohol Use               Standard Drinks/Week 

 

                                         



                           Never                     0 (1 standard drink = 0.6 o

z pure alcohol) 







  



                     Alcohol Habits      Answer              Date Recorded

 

  



                     How often do you have a drink containing alcohol?  Never   

            2021

 

  



                           How many drinks containing alcohol do you have on  No

t asked 



                                         a typical day when you are drinking?  

 

  



                           How often do you have six or more drinks on one  Not 

asked 



                                         occasion?  







 



                           Sex Assigned at Birth     Date Recorded

 

 



                                         Not on file 







                                        Date Recorded



                           COVID-19 Exposure         Response 

 

                                        2021 12:53 PM CDT



                           In the last month, have you been in contact with  [facility]

[facility] to assess 



                                         someone who was confirmed or suspected 

to have  



                                         Coronavirus / COVID-19?  



documented as of this encounter



Plan of Treatment





                                        Date/Time



                 Name            Type            Priority        Associated Diag

noses 

 

                                        2021 11:51 AM CDT



                     POC MICU US CARDIAC  Imaging             Routine  



                                         LIMITED    



documented as of this encounter



Visit Diagnoses

Not on filedocumented in this encounter



Additional Health Concerns





                          Last Indicated            Resolved Time



                           Infection                 Onset Date  

 

                          2021                 



                           C difficile Rule-Out      2021  







 



                           Assessment                Noted Time

 

 



                           A fall risk assessment has been completed for the pat

ient  2021  1:45 PM 

CDT



documented as of this encounter
Encounter Summary

                             Created on: 2021



Kiran Gibson

External Reference #: [id number]

: 1955

Sex: Male



Demographics





                          Address                   [address]

[place]  [address]

 

                          Home Phone                [phone]

 

                          Preferred Language        English

 

                          Marital Status            

 

                          Yazidism Affiliation     Unknown

 

                          Race                      White

 

                          Ethnic Group              Not  or 





Author





                          Author                    [facility]

 

                          Organization              [facility]

 

                          Address                   Unknown

 

                          Phone                     Unavailable







Support





                Name            Relationship    Address         Phone

 

                Linh Gibson   ECON            Unknown         [phone]

 

                Soco Mulligan       ECON            Unknown         [phone]







Care Team Providers





                    Care Team Member Name Role                Phone

 

                    Self, Simba CHRISTIAN    PCP                 [phone]

 

                    Gerg Brown MD [phone]          [phone]

 

                    Jessica Wilson RN [phone]          Unavailable

 

                    Josie Yang RN   [phone]          Unavailable







Encounter Details





                          Care Team                 Description



                     Date                Type                Department  

 

                                                     



                           2021                Travel   







Social History





                                        Date



                 Tobacco Use     Types           Packs/Day       Years Used 

 

                                         



                                         Never Smoker    

 

    



                                         Smokeless Tobacco: Never   



                                         Used   







                                        Comments



                           Alcohol Use               Standard Drinks/Week 

 

                                         



                           Never                     0 (1 standard drink = 0.6 o

z pure alcohol) 







  



                     Alcohol Habits      Answer              Date Recorded

 

  



                     How often do you have a drink containing alcohol?  Never   

            2021

 

  



                           How many drinks containing alcohol do you have on  No

t asked 



                                         a typical day when you are drinking?  

 

  



                           How often do you have six or more drinks on one  Not 

asked 



                                         occasion?  







 



                           Sex Assigned at Birth     Date Recorded

 

 



                                         Not on file 







                                        Date Recorded



                           COVID-19 Exposure         Response 

 

                                        2021 12:53 PM CDT



                           In the last month, have you been in contact with  [facility] to assess 



                                         someone who was confirmed or suspected 

to have  



                                         Coronavirus / COVID-19?  



documented as of this encounter



Plan of Treatment





Not on filedocumented as of this encounter



Visit Diagnoses

Not on filedocumented in this encounter



Additional Health Concerns





                          Last Indicated            Resolved Time



                           Infection                 Onset Date  

 

                          2021                 



                           C difficile Rule-Out      2021  







 



                           Assessment                Noted Time

 

 



                           A fall risk assessment has been completed for the pat

ient  2021  1:45 PM 

CDT



documented as of this encounter
Encounter Summary

                             Created on: 2021



Kiran Gibson

External Reference #: [id number]

: 1955

Sex: Male



Demographics





                          Address                   [address] [place]  [address]

 

                          Home Phone                [phone]

 

                          Preferred Language        English

 

                          Marital Status            

 

                          Restorationist Affiliation     Unknown

 

                          Race                      White

 

                          Ethnic Group              Not  or 





Author





                          Author                    [facility]

 

                          Organization              [facility]

 

                          Address                   Unknown

 

                          Phone                     Unavailable







Support





                Name            Relationship    Address         Phone

 

                Linh Gibson   ECON            Unknown         [phone]

 

                Soco Mulligan       ECON            Unknown         [phone]







Care Team Providers





                    Care Team Member Name Role                Phone

 

                    Self, Simba CHRISTIAN    PCP                 [phone]

 

                    Greg Brown MD [phone]          [phone]

 

                    Jessica Wilson RN [phone]          Unavailable

 

                    Josie Yang RN   [phone]          Unavailable







Reason for Visit

* 



  



                     Reason              Onset Date          Comments

 

  



                           Care Coordination         2021 









Encounter Details





                          Care Team                 Description



                     Date                Type                Department  

 

                                        



Diana Yang RN                     Care Coordination



                     2021          Telephone           [facility]  



                                         Transplant: [facility] [address]  



                                         [address], [address]  



                                         [address] [phone]  







Social History





                                        Date



                 Tobacco Use     Types           Packs/Day       Years Used 

 

                                         



                                         Never Smoker    

 

    



                                         Smokeless Tobacco: Never   



                                         Used   







                                        Comments



                           Alcohol Use               Standard Drinks/Week 

 

                                         



                           Never                     0 (1 standard drink = 0.6 o

z pure alcohol) 







  



                     Alcohol Habits      Answer              Date Recorded

 

  



                     How often do you have a drink containing alcohol?  Never   

            2021

 

  



                           How many drinks containing alcohol do you have on  No

t asked 



                                         a typical day when you are drinking?  

 

  



                           How often do you have six or more drinks on one  Not 

asked 



                                         occasion?  







 



                           Sex Assigned at Birth     Date Recorded

 

 



                                         Not on file 



documented as of this encounter



Miscellaneous Notes

* Telephone Encounter - Diana Menendez RN - 2021  3:00 PM CDT



Formatting of this note might be different from the original.

Per Dr. Dowd's note on  patient is not interested in continuing with stem ce
ll transplant. This PTC called patient to confirm and ensure patient has this PT
C's phone number if he ever needs anything. 

No answer, PTC l/m with above information for him to return call. 



Josie Yang, RN

Pager [phone]





Electronically signed by Diana Menendez RN at 2021  3:01 PM CDT

documented in this encounter



Plan of Treatment





Not on filedocumented as of this encounter



Visit Diagnoses

Not on filedocumented in this encounter



Additional Health Concerns





 



                           Assessment                Noted Time

 

 



                           A fall risk assessment has been completed for the pat

ient  2021 12:31 PM 

CDT



documented as of this encounter
Statement Selected

## 2021-09-22 LAB
ALBUMIN SERPL-MCNC: 3.5 GM/DL (ref 3.2–4.5)
ALP SERPL-CCNC: 79 U/L (ref 40–136)
ALT SERPL-CCNC: 9 U/L (ref 0–55)
BASOPHILS # BLD AUTO: 0 10^3/UL (ref 0–0.1)
BASOPHILS NFR BLD AUTO: 0 % (ref 0–10)
BILIRUB SERPL-MCNC: 0.6 MG/DL (ref 0.1–1)
BUN/CREAT SERPL: 9
CALCIUM SERPL-MCNC: 9 MG/DL (ref 8.5–10.1)
CHLORIDE SERPL-SCNC: 98 MMOL/L (ref 98–107)
CO2 SERPL-SCNC: 31 MMOL/L (ref 21–32)
CREAT SERPL-MCNC: 2.73 MG/DL (ref 0.6–1.3)
EOSINOPHIL # BLD AUTO: 0 10^3/UL (ref 0–0.3)
EOSINOPHIL NFR BLD AUTO: 0 % (ref 0–10)
GFR SERPLBLD BASED ON 1.73 SQ M-ARVRAT: 24 ML/MIN
GLUCOSE SERPL-MCNC: 139 MG/DL (ref 70–105)
HCT VFR BLD CALC: 29 % (ref 40–54)
HGB BLD-MCNC: 9.2 G/DL (ref 13.3–17.7)
LYMPHOCYTES # BLD AUTO: 0.3 X 10^3 (ref 1–4)
LYMPHOCYTES NFR BLD AUTO: 5 % (ref 12–44)
MANUAL DIFFERENTIAL PERFORMED BLD QL: NO
MCH RBC QN AUTO: 31 PG (ref 25–34)
MCHC RBC AUTO-ENTMCNC: 31 G/DL (ref 32–36)
MCV RBC AUTO: 98 FL (ref 80–99)
MONOCYTES # BLD AUTO: 0.3 X 10^3 (ref 0–1)
MONOCYTES NFR BLD AUTO: 5 % (ref 0–12)
NEUTROPHILS # BLD AUTO: 5.9 X 10^3 (ref 1.8–7.8)
NEUTROPHILS NFR BLD AUTO: 89 % (ref 42–75)
PLATELET # BLD: 140 10^3/UL (ref 130–400)
PMV BLD AUTO: 9.8 FL (ref 9–12.2)
POTASSIUM SERPL-SCNC: 3.9 MMOL/L (ref 3.6–5)
PROT SERPL-MCNC: 6.1 GM/DL (ref 6.4–8.2)
SODIUM SERPL-SCNC: 138 MMOL/L (ref 135–145)
WBC # BLD AUTO: 6.6 10^3/UL (ref 4.3–11)

## 2021-09-27 LAB
BASOPHILS # BLD AUTO: 0 10^3/UL (ref 0–0.1)
BASOPHILS NFR BLD AUTO: 0 % (ref 0–10)
BUN/CREAT SERPL: 11
CALCIUM SERPL-MCNC: 9.9 MG/DL (ref 8.5–10.1)
CHLORIDE SERPL-SCNC: 98 MMOL/L (ref 98–107)
CO2 SERPL-SCNC: 26 MMOL/L (ref 21–32)
CREAT SERPL-MCNC: 3.1 MG/DL (ref 0.6–1.3)
EOSINOPHIL # BLD AUTO: 0 10^3/UL (ref 0–0.3)
EOSINOPHIL NFR BLD AUTO: 0 % (ref 0–10)
GFR SERPLBLD BASED ON 1.73 SQ M-ARVRAT: 20 ML/MIN
GLUCOSE SERPL-MCNC: 134 MG/DL (ref 70–105)
HCT VFR BLD CALC: 29 % (ref 40–54)
HGB BLD-MCNC: 9.1 G/DL (ref 13.3–17.7)
LYMPHOCYTES # BLD AUTO: 0.4 X 10^3 (ref 1–4)
LYMPHOCYTES NFR BLD AUTO: 4 % (ref 12–44)
MANUAL DIFFERENTIAL PERFORMED BLD QL: NO
MCH RBC QN AUTO: 32 PG (ref 25–34)
MCHC RBC AUTO-ENTMCNC: 32 G/DL (ref 32–36)
MCV RBC AUTO: 100 FL (ref 80–99)
MONOCYTES # BLD AUTO: 0.4 X 10^3 (ref 0–1)
MONOCYTES NFR BLD AUTO: 4 % (ref 0–12)
NEUTROPHILS # BLD AUTO: 9.2 X 10^3 (ref 1.8–7.8)
NEUTROPHILS NFR BLD AUTO: 91 % (ref 42–75)
PLATELET # BLD: 172 10^3/UL (ref 130–400)
PMV BLD AUTO: 9.9 FL (ref 9–12.2)
POTASSIUM SERPL-SCNC: 4.3 MMOL/L (ref 3.6–5)
SODIUM SERPL-SCNC: 137 MMOL/L (ref 135–145)
WBC # BLD AUTO: 10.1 10^3/UL (ref 4.3–11)

## 2021-10-04 LAB
BASOPHILS # BLD AUTO: 0 10^3/UL (ref 0–0.1)
BASOPHILS NFR BLD AUTO: 0 % (ref 0–10)
BUN/CREAT SERPL: 8
CALCIUM SERPL-MCNC: 9.4 MG/DL (ref 8.5–10.1)
CHLORIDE SERPL-SCNC: 97 MMOL/L (ref 98–107)
CO2 SERPL-SCNC: 29 MMOL/L (ref 21–32)
CREAT SERPL-MCNC: 4.28 MG/DL (ref 0.6–1.3)
EOSINOPHIL # BLD AUTO: 0.1 10^3/UL (ref 0–0.3)
EOSINOPHIL NFR BLD AUTO: 1 % (ref 0–10)
GFR SERPLBLD BASED ON 1.73 SQ M-ARVRAT: 14 ML/MIN
GLUCOSE SERPL-MCNC: 143 MG/DL (ref 70–105)
HCT VFR BLD CALC: 31 % (ref 40–54)
HGB BLD-MCNC: 9.6 G/DL (ref 13.3–17.7)
LYMPHOCYTES # BLD AUTO: 0.3 10^3/UL (ref 1–4)
LYMPHOCYTES NFR BLD AUTO: 4 % (ref 12–44)
MANUAL DIFFERENTIAL PERFORMED BLD QL: NO
MCH RBC QN AUTO: 32 PG (ref 25–34)
MCHC RBC AUTO-ENTMCNC: 31 G/DL (ref 32–36)
MCV RBC AUTO: 104 FL (ref 80–99)
MONOCYTES # BLD AUTO: 0.3 10^3/UL (ref 0–1)
MONOCYTES NFR BLD AUTO: 4 % (ref 0–12)
NEUTROPHILS # BLD AUTO: 7.7 10^3/UL (ref 1.8–7.8)
NEUTROPHILS NFR BLD AUTO: 91 % (ref 42–75)
PLATELET # BLD: 170 10^3/UL (ref 130–400)
PMV BLD AUTO: 9.8 FL (ref 9–12.2)
POTASSIUM SERPL-SCNC: 4.4 MMOL/L (ref 3.6–5)
SODIUM SERPL-SCNC: 136 MMOL/L (ref 135–145)
WBC # BLD AUTO: 8.5 10^3/UL (ref 4.3–11)

## 2021-10-11 ENCOUNTER — HOSPITAL ENCOUNTER (OUTPATIENT)
Dept: HOSPITAL 75 - ONC | Age: 66
LOS: 6 days | Discharge: HOME | End: 2021-10-17
Attending: INTERNAL MEDICINE
Payer: MEDICARE

## 2021-10-11 DIAGNOSIS — M51.26: ICD-10-CM

## 2021-10-11 DIAGNOSIS — M51.27: ICD-10-CM

## 2021-10-11 DIAGNOSIS — Z51.11: Primary | ICD-10-CM

## 2021-10-11 DIAGNOSIS — M51.24: ICD-10-CM

## 2021-10-11 DIAGNOSIS — C90.00: ICD-10-CM

## 2021-10-11 DIAGNOSIS — M48.061: ICD-10-CM

## 2021-10-11 DIAGNOSIS — M47.817: ICD-10-CM

## 2021-10-11 DIAGNOSIS — M48.04: ICD-10-CM

## 2021-10-11 DIAGNOSIS — M47.816: ICD-10-CM

## 2021-10-11 DIAGNOSIS — D64.9: ICD-10-CM

## 2021-10-11 DIAGNOSIS — M47.814: ICD-10-CM

## 2021-10-11 DIAGNOSIS — M48.07: ICD-10-CM

## 2021-10-11 LAB
ALBUMIN SERPL-MCNC: 3.9 GM/DL (ref 3.2–4.5)
ALP SERPL-CCNC: 74 U/L (ref 40–136)
ALT SERPL-CCNC: 11 U/L (ref 0–55)
BASOPHILS # BLD AUTO: 0 10^3/UL (ref 0–0.1)
BASOPHILS NFR BLD AUTO: 0 % (ref 0–10)
BILIRUB SERPL-MCNC: 0.8 MG/DL (ref 0.1–1)
BUN/CREAT SERPL: 7
CALCIUM SERPL-MCNC: 10.4 MG/DL (ref 8.5–10.1)
CHLORIDE SERPL-SCNC: 96 MMOL/L (ref 98–107)
CO2 SERPL-SCNC: 28 MMOL/L (ref 21–32)
CREAT SERPL-MCNC: 3.57 MG/DL (ref 0.6–1.3)
EOSINOPHIL # BLD AUTO: 0.1 10^3/UL (ref 0–0.3)
EOSINOPHIL NFR BLD AUTO: 1 % (ref 0–10)
GFR SERPLBLD BASED ON 1.73 SQ M-ARVRAT: 17 ML/MIN
GLUCOSE SERPL-MCNC: 113 MG/DL (ref 70–105)
HCT VFR BLD CALC: 32 % (ref 40–54)
HGB BLD-MCNC: 10.1 G/DL (ref 13.3–17.7)
LYMPHOCYTES # BLD AUTO: 0.4 X 10^3 (ref 1–4)
LYMPHOCYTES NFR BLD AUTO: 6 % (ref 12–44)
MANUAL DIFFERENTIAL PERFORMED BLD QL: NO
MCH RBC QN AUTO: 33 PG (ref 25–34)
MCHC RBC AUTO-ENTMCNC: 32 G/DL (ref 32–36)
MCV RBC AUTO: 103 FL (ref 80–99)
MONOCYTES # BLD AUTO: 0.3 X 10^3 (ref 0–1)
MONOCYTES NFR BLD AUTO: 4 % (ref 0–12)
NEUTROPHILS # BLD AUTO: 5.9 X 10^3 (ref 1.8–7.8)
NEUTROPHILS NFR BLD AUTO: 88 % (ref 42–75)
PLATELET # BLD: 157 10^3/UL (ref 130–400)
PMV BLD AUTO: 9.7 FL (ref 9–12.2)
POTASSIUM SERPL-SCNC: 4 MMOL/L (ref 3.6–5)
PROT SERPL-MCNC: 6.5 GM/DL (ref 6.4–8.2)
SODIUM SERPL-SCNC: 135 MMOL/L (ref 135–145)
WBC # BLD AUTO: 6.7 10^3/UL (ref 4.3–11)

## 2021-10-11 PROCEDURE — 36430 TRANSFUSION BLD/BLD COMPNT: CPT

## 2021-10-11 PROCEDURE — 36591 DRAW BLOOD OFF VENOUS DEVICE: CPT

## 2021-10-11 PROCEDURE — 99213 OFFICE O/P EST LOW 20 MIN: CPT

## 2021-10-11 PROCEDURE — 80048 BASIC METABOLIC PNL TOTAL CA: CPT

## 2021-10-11 PROCEDURE — 84155 ASSAY OF PROTEIN SERUM: CPT

## 2021-10-11 PROCEDURE — 96375 TX/PRO/DX INJ NEW DRUG ADDON: CPT

## 2021-10-11 PROCEDURE — 86901 BLOOD TYPING SEROLOGIC RH(D): CPT

## 2021-10-11 PROCEDURE — 86922 COMPATIBILITY TEST ANTIGLOB: CPT

## 2021-10-11 PROCEDURE — 84165 PROTEIN E-PHORESIS SERUM: CPT

## 2021-10-11 PROCEDURE — 85025 COMPLETE CBC W/AUTO DIFF WBC: CPT

## 2021-10-11 PROCEDURE — 83883 ASSAY NEPHELOMETRY NOT SPEC: CPT

## 2021-10-11 PROCEDURE — 96372 THER/PROPH/DIAG INJ SC/IM: CPT

## 2021-10-11 PROCEDURE — 96401 CHEMO ANTI-NEOPL SQ/IM: CPT

## 2021-10-11 PROCEDURE — 82232 ASSAY OF BETA-2 PROTEIN: CPT

## 2021-10-11 PROCEDURE — 86900 BLOOD TYPING SEROLOGIC ABO: CPT

## 2021-10-11 PROCEDURE — 86850 RBC ANTIBODY SCREEN: CPT

## 2021-10-11 PROCEDURE — 82784 ASSAY IGA/IGD/IGG/IGM EACH: CPT

## 2021-10-11 PROCEDURE — 80053 COMPREHEN METABOLIC PANEL: CPT

## 2021-10-18 LAB
BASOPHILS # BLD AUTO: 0 10^3/UL (ref 0–0.1)
BASOPHILS NFR BLD AUTO: 0 % (ref 0–10)
EOSINOPHIL # BLD AUTO: 0.1 10^3/UL (ref 0–0.3)
EOSINOPHIL NFR BLD AUTO: 1 % (ref 0–10)
HCT VFR BLD CALC: 31 % (ref 40–54)
HGB BLD-MCNC: 9.7 G/DL (ref 13.3–17.7)
LYMPHOCYTES # BLD AUTO: 0.4 10^3/UL (ref 1–4)
LYMPHOCYTES NFR BLD AUTO: 7 % (ref 12–44)
MANUAL DIFFERENTIAL PERFORMED BLD QL: NO
MCH RBC QN AUTO: 33 PG (ref 25–34)
MCHC RBC AUTO-ENTMCNC: 32 G/DL (ref 32–36)
MCV RBC AUTO: 105 FL (ref 80–99)
MONOCYTES # BLD AUTO: 0.5 10^3/UL (ref 0–1)
MONOCYTES NFR BLD AUTO: 9 % (ref 0–12)
NEUTROPHILS # BLD AUTO: 4.8 10^3/UL (ref 1.8–7.8)
NEUTROPHILS NFR BLD AUTO: 82 % (ref 42–75)
PLATELET # BLD: 149 10^3/UL (ref 130–400)
PMV BLD AUTO: 9.8 FL (ref 9–12.2)
WBC # BLD AUTO: 5.9 10^3/UL (ref 4.3–11)

## 2021-11-08 LAB
ALBUMIN SERPL-MCNC: 4 GM/DL (ref 3.2–4.5)
ALP SERPL-CCNC: 68 U/L (ref 40–136)
ALT SERPL-CCNC: < 6 U/L (ref 0–55)
BASOPHILS # BLD AUTO: 0 10^3/UL (ref 0–0.1)
BASOPHILS NFR BLD AUTO: 0 % (ref 0–10)
BILIRUB SERPL-MCNC: 0.8 MG/DL (ref 0.1–1)
BUN/CREAT SERPL: 10
CALCIUM SERPL-MCNC: 10.9 MG/DL (ref 8.5–10.1)
CHLORIDE SERPL-SCNC: 101 MMOL/L (ref 98–107)
CO2 SERPL-SCNC: 17 MMOL/L (ref 21–32)
CREAT SERPL-MCNC: 9.74 MG/DL (ref 0.6–1.3)
EOSINOPHIL # BLD AUTO: 0.2 10^3/UL (ref 0–0.3)
EOSINOPHIL NFR BLD AUTO: 3 % (ref 0–10)
GFR SERPLBLD BASED ON 1.73 SQ M-ARVRAT: 5 ML/MIN
GLUCOSE SERPL-MCNC: 93 MG/DL (ref 70–105)
HCT VFR BLD CALC: 33 % (ref 40–54)
HGB BLD-MCNC: 10.3 G/DL (ref 13.3–17.7)
LYMPHOCYTES # BLD AUTO: 0.7 10^3/UL (ref 1–4)
LYMPHOCYTES NFR BLD AUTO: 11 % (ref 12–44)
MANUAL DIFFERENTIAL PERFORMED BLD QL: NO
MCH RBC QN AUTO: 34 PG (ref 25–34)
MCHC RBC AUTO-ENTMCNC: 32 G/DL (ref 32–36)
MCV RBC AUTO: 107 FL (ref 80–99)
MONOCYTES # BLD AUTO: 0.5 10^3/UL (ref 0–1)
MONOCYTES NFR BLD AUTO: 8 % (ref 0–12)
NEUTROPHILS # BLD AUTO: 5.2 10^3/UL (ref 1.8–7.8)
NEUTROPHILS NFR BLD AUTO: 77 % (ref 42–75)
PLATELET # BLD: 145 10^3/UL (ref 130–400)
PMV BLD AUTO: 9.5 FL (ref 9–12.2)
POTASSIUM SERPL-SCNC: 5.6 MMOL/L (ref 3.6–5)
PROT SERPL-MCNC: 6.9 GM/DL (ref 6.4–8.2)
SODIUM SERPL-SCNC: 137 MMOL/L (ref 135–145)
WBC # BLD AUTO: 6.7 10^3/UL (ref 4.3–11)

## 2021-11-15 ENCOUNTER — HOSPITAL ENCOUNTER (OUTPATIENT)
Dept: HOSPITAL 75 - ONC | Age: 66
LOS: 46 days | Discharge: HOME | End: 2021-12-31
Attending: INTERNAL MEDICINE
Payer: MEDICARE

## 2021-11-15 DIAGNOSIS — Z92.21: ICD-10-CM

## 2021-11-15 DIAGNOSIS — I82.409: ICD-10-CM

## 2021-11-15 DIAGNOSIS — N18.5: ICD-10-CM

## 2021-11-15 DIAGNOSIS — E03.9: ICD-10-CM

## 2021-11-15 DIAGNOSIS — Z45.2: Primary | ICD-10-CM

## 2021-11-15 DIAGNOSIS — D63.1: ICD-10-CM

## 2021-11-15 DIAGNOSIS — I63.9: ICD-10-CM

## 2021-11-15 DIAGNOSIS — C90.00: ICD-10-CM

## 2021-11-15 PROCEDURE — 82784 ASSAY IGA/IGD/IGG/IGM EACH: CPT

## 2021-11-15 PROCEDURE — 85025 COMPLETE CBC W/AUTO DIFF WBC: CPT

## 2021-11-15 PROCEDURE — 83883 ASSAY NEPHELOMETRY NOT SPEC: CPT

## 2021-11-15 PROCEDURE — 99213 OFFICE O/P EST LOW 20 MIN: CPT

## 2021-11-15 PROCEDURE — 82232 ASSAY OF BETA-2 PROTEIN: CPT

## 2021-11-15 PROCEDURE — 80053 COMPREHEN METABOLIC PANEL: CPT

## 2021-11-15 PROCEDURE — 36591 DRAW BLOOD OFF VENOUS DEVICE: CPT

## 2021-12-02 ENCOUNTER — HOSPITAL ENCOUNTER (OUTPATIENT)
Dept: HOSPITAL 75 - RAD FS | Age: 66
End: 2021-12-02
Attending: FAMILY MEDICINE
Payer: MEDICARE

## 2021-12-02 DIAGNOSIS — R07.81: ICD-10-CM
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
## 2021-12-02 DIAGNOSIS — M89.9: ICD-10-CM

## 2021-12-02 DIAGNOSIS — M16.11: Primary | ICD-10-CM

## 2021-12-02 PROCEDURE — 73502 X-RAY EXAM HIP UNI 2-3 VIEWS: CPT

## 2021-12-02 PROCEDURE — 71100 X-RAY EXAM RIBS UNI 2 VIEWS: CPT

## 2021-12-02 NOTE — DIAGNOSTIC IMAGING REPORT
INDICATION: Right hip injury from a fall.



2 views of the right hip shows generalized osteoporosis with some

small lytic areas present throughout the pelvis and femur. There

is mild joint space narrowing of the hip.



IMPRESSION: Mild degenerative changes of the hip. No fractures

seen. There is decreased density with multiple small osteolytic

lesions in the bones that could be due to metastatic disease or

multiple myeloma.



Dictated by: 



  Dictated on workstation # [id number]

## 2021-12-02 NOTE — DIAGNOSTIC IMAGING REPORT
INDICATION: Right rib injury from a fall.



3 views of the right ribs are obtained.



There is expansile lesion in the right anterior 6th rib. There is

slight deformity of the lateral 10th rib.



IMPRESSION: Possible nondisplaced fracture right lateral 10th

rib. Clinical correlation recommended. Osteolytic bone lesions

suspicious for metastatic disease or multiple myeloma.. There are

no effusions or pneumothoraces. There is some osteolytic lesion

seen in the humerus and scapula.



Dictated by: 



  Dictated on workstation # [id number]
